# Patient Record
Sex: FEMALE | Race: WHITE | Employment: OTHER | URBAN - METROPOLITAN AREA
[De-identification: names, ages, dates, MRNs, and addresses within clinical notes are randomized per-mention and may not be internally consistent; named-entity substitution may affect disease eponyms.]

---

## 2017-07-07 ENCOUNTER — HOSPITAL ENCOUNTER (OUTPATIENT)
Dept: MAMMOGRAPHY | Age: 77
Discharge: HOME | End: 2017-07-07
Attending: FAMILY MEDICINE
Payer: MEDICARE

## 2017-07-07 DIAGNOSIS — Z12.31 ENCOUNTER FOR SCREENING MAMMOGRAM FOR MALIGNANT NEOPLASM OF BREAST: ICD-10-CM

## 2017-07-07 PROCEDURE — 77067 SCR MAMMO BI INCL CAD: CPT

## 2018-07-20 ENCOUNTER — HOSPITAL ENCOUNTER (OUTPATIENT)
Dept: MAMMOGRAPHY | Age: 78
Discharge: HOME | End: 2018-07-20
Attending: FAMILY MEDICINE
Payer: MEDICARE

## 2018-07-20 DIAGNOSIS — Z12.31 ENCOUNTER FOR SCREENING MAMMOGRAM FOR MALIGNANT NEOPLASM OF BREAST: ICD-10-CM

## 2018-07-20 PROCEDURE — 77067 SCR MAMMO BI INCL CAD: CPT

## 2019-01-29 PROBLEM — R20.0 NUMBNESS AND TINGLING OF LEG: Status: ACTIVE | Noted: 2019-01-29

## 2019-01-29 PROBLEM — M79.2 NEUROPATHIC PAIN OF ANKLE, RIGHT: Status: ACTIVE | Noted: 2019-01-29

## 2019-01-29 PROBLEM — Z79.899 ENCOUNTER FOR MEDICATION MANAGEMENT: Status: ACTIVE | Noted: 2019-01-29

## 2019-01-29 PROBLEM — R20.2 NUMBNESS AND TINGLING OF LEG: Status: ACTIVE | Noted: 2019-01-29

## 2019-05-10 PROBLEM — E66.3 OVERWEIGHT: Status: ACTIVE | Noted: 2019-05-10

## 2019-07-29 ENCOUNTER — HOSPITAL ENCOUNTER (OUTPATIENT)
Dept: LAB | Age: 79
Discharge: HOME | End: 2019-07-29

## 2019-07-29 PROCEDURE — 88305 TISSUE EXAM BY PATHOLOGIST: CPT

## 2019-09-13 ENCOUNTER — HOSPITAL ENCOUNTER (OUTPATIENT)
Dept: MAMMOGRAPHY | Age: 79
Discharge: HOME | End: 2019-09-13
Attending: FAMILY MEDICINE
Payer: MEDICARE

## 2019-09-13 DIAGNOSIS — Z12.39 BREAST SCREENING, UNSPECIFIED: ICD-10-CM

## 2019-09-13 PROCEDURE — 77067 SCR MAMMO BI INCL CAD: CPT

## 2020-01-29 ENCOUNTER — APPOINTMENT (RX ONLY)
Dept: URBAN - METROPOLITAN AREA CLINIC 24 | Facility: CLINIC | Age: 80
Setting detail: DERMATOLOGY
End: 2020-01-29

## 2020-01-29 DIAGNOSIS — L81.4 OTHER MELANIN HYPERPIGMENTATION: ICD-10-CM

## 2020-01-29 DIAGNOSIS — L82.1 OTHER SEBORRHEIC KERATOSIS: ICD-10-CM

## 2020-01-29 DIAGNOSIS — D22 MELANOCYTIC NEVI: ICD-10-CM

## 2020-01-29 DIAGNOSIS — D485 NEOPLASM OF UNCERTAIN BEHAVIOR OF SKIN: ICD-10-CM

## 2020-01-29 DIAGNOSIS — D18.0 HEMANGIOMA: ICD-10-CM

## 2020-01-29 PROBLEM — D22.5 MELANOCYTIC NEVI OF TRUNK: Status: ACTIVE | Noted: 2020-01-29

## 2020-01-29 PROBLEM — D18.01 HEMANGIOMA OF SKIN AND SUBCUTANEOUS TISSUE: Status: ACTIVE | Noted: 2020-01-29

## 2020-01-29 PROBLEM — D48.5 NEOPLASM OF UNCERTAIN BEHAVIOR OF SKIN: Status: ACTIVE | Noted: 2020-01-29

## 2020-01-29 PROCEDURE — ? COUNSELING

## 2020-01-29 PROCEDURE — 99202 OFFICE O/P NEW SF 15 MIN: CPT | Mod: 25

## 2020-01-29 PROCEDURE — 11102 TANGNTL BX SKIN SINGLE LES: CPT

## 2020-01-29 PROCEDURE — ? BIOPSY BY SHAVE METHOD

## 2020-01-29 ASSESSMENT — LOCATION DETAILED DESCRIPTION DERM
LOCATION DETAILED: RIGHT MEDIAL FOREHEAD
LOCATION DETAILED: LEFT SUPERIOR UPPER BACK
LOCATION DETAILED: RIGHT POSTERIOR SHOULDER
LOCATION DETAILED: RIGHT LATERAL ABDOMEN
LOCATION DETAILED: RIGHT SUPERIOR LATERAL UPPER BACK
LOCATION DETAILED: LEFT POSTERIOR SHOULDER

## 2020-01-29 ASSESSMENT — LOCATION SIMPLE DESCRIPTION DERM
LOCATION SIMPLE: LEFT UPPER BACK
LOCATION SIMPLE: RIGHT FOREHEAD
LOCATION SIMPLE: RIGHT BACK
LOCATION SIMPLE: LEFT SHOULDER
LOCATION SIMPLE: ABDOMEN
LOCATION SIMPLE: RIGHT SHOULDER

## 2020-01-29 ASSESSMENT — LOCATION ZONE DERM
LOCATION ZONE: TRUNK
LOCATION ZONE: ARM
LOCATION ZONE: FACE

## 2020-01-29 NOTE — HPI: RASH
What Type Of Note Output Would You Prefer (Optional)?: Standard Output
How Severe Is Your Rash?: mild
Is This A New Presentation, Or A Follow-Up?: Rash
Additional History: Took pepto last night and is now red in the face.

## 2020-01-29 NOTE — PROCEDURE: BIOPSY BY SHAVE METHOD
Detail Level: Detailed
Cryotherapy Text: The wound bed was treated with cryotherapy after the biopsy was performed.
Billing Type: Third-Party Bill
Electrodesiccation Text: The wound bed was treated with electrodesiccation after the biopsy was performed.
Render Post-Care Instructions In Note?: no
Wound Care: Petrolatum
Type Of Destruction Used: Curettage
Dressing: bandage
Depth Of Biopsy: dermis
Anesthesia Volume In Cc: 0.5
Biopsy Type: H and E
Was A Bandage Applied: Yes
Biopsy Method: Dermablade
Size Of Lesion In Cm: 0
Accession #: PC
Anesthesia Type: 1% lidocaine with 1:100,000 epinephrine and a 1:6 solution of 8.4% sodium bicarbonate
Silver Nitrate Text: The wound bed was treated with silver nitrate after the biopsy was performed.
Curettage Text: The wound bed was treated with curettage after the biopsy was performed.
Electrodesiccation And Curettage Text: The wound bed was treated with electrodesiccation and curettage after the biopsy was performed.
Hemostasis: Aluminum Chloride

## 2021-03-08 ENCOUNTER — APPOINTMENT (RX ONLY)
Dept: URBAN - METROPOLITAN AREA CLINIC 24 | Facility: CLINIC | Age: 81
Setting detail: DERMATOLOGY
End: 2021-03-08

## 2021-03-08 DIAGNOSIS — Z87.2 PERSONAL HISTORY OF DISEASES OF THE SKIN AND SUBCUTANEOUS TISSUE: ICD-10-CM

## 2021-03-08 DIAGNOSIS — L57.8 OTHER SKIN CHANGES DUE TO CHRONIC EXPOSURE TO NONIONIZING RADIATION: ICD-10-CM | Status: STABLE

## 2021-03-08 DIAGNOSIS — L60.3 NAIL DYSTROPHY: ICD-10-CM

## 2021-03-08 DIAGNOSIS — L82.0 INFLAMED SEBORRHEIC KERATOSIS: ICD-10-CM

## 2021-03-08 PROBLEM — E78.5 HYPERLIPIDEMIA, UNSPECIFIED: Status: ACTIVE | Noted: 2021-03-08

## 2021-03-08 PROBLEM — J30.1 ALLERGIC RHINITIS DUE TO POLLEN: Status: ACTIVE | Noted: 2021-03-08

## 2021-03-08 PROCEDURE — ? SHAVE REMOVAL

## 2021-03-08 PROCEDURE — 99213 OFFICE O/P EST LOW 20 MIN: CPT | Mod: 25

## 2021-03-08 PROCEDURE — ? COUNSELING

## 2021-03-08 PROCEDURE — 11311 SHAVE SKIN LESION 0.6-1.0 CM: CPT

## 2021-03-08 PROCEDURE — ? TREATMENT REGIMEN

## 2021-03-08 PROCEDURE — ? OTHER

## 2021-03-08 ASSESSMENT — LOCATION ZONE DERM
LOCATION ZONE: TRUNK
LOCATION ZONE: FACE
LOCATION ZONE: FINGERNAIL

## 2021-03-08 ASSESSMENT — LOCATION DETAILED DESCRIPTION DERM
LOCATION DETAILED: RIGHT MIDDLE FINGERNAIL
LOCATION DETAILED: RIGHT SUPERIOR LATERAL MIDBACK
LOCATION DETAILED: SUPERIOR THORACIC SPINE
LOCATION DETAILED: RIGHT MEDIAL ZYGOMA

## 2021-03-08 ASSESSMENT — LOCATION SIMPLE DESCRIPTION DERM
LOCATION SIMPLE: RIGHT LOWER BACK
LOCATION SIMPLE: RIGHT ZYGOMA
LOCATION SIMPLE: RIGHT MIDDLE FINGERNAIL
LOCATION SIMPLE: UPPER BACK

## 2021-03-08 NOTE — PROCEDURE: OTHER
Note Text (......Xxx Chief Complaint.): This diagnosis correlates with the
Detail Level: Simple
Render Risk Assessment In Note?: no
Other (Free Text): Pt gave verbal consent for treatment today. Witnessed by Cassi Fishman CST

## 2021-03-08 NOTE — PROCEDURE: SHAVE REMOVAL
Render Path Notes In Note?: No
Medical Necessity Clause: Obstruction of vision
Medical Necessity Information: It is in your best interest to select a reason for this procedure from the list below. All of these items fulfill various CMS LCD requirements except the new and changing color options.
Accession #: [id number]
Anesthesia Type: 1% lidocaine with epinephrine and a 1:10 solution of 8.4% sodium bicarbonate
Billing Type: Third-Party Bill
Wound Care: Vaseline
Was A Bandage Applied: Yes
Detail Level: Detailed
X Size Of Lesion In Cm (Optional): 0
Anesthesia Volume In Cc: 0.4
Hemostasis: Electrodesiccation
Biopsy Method: Dermablade
Consent was obtained from the patient. The risks and benefits to therapy were discussed in detail. Specifically, the risks of infection, scarring, bleeding, prolonged wound healing, incomplete removal, allergy to anesthesia, nerve injury and recurrence were addressed. Prior to the procedure, the treatment site was clearly identified and confirmed by the patient. All components of Universal Protocol/PAUSE Rule completed.
Size Of Lesion In Cm (Required): 0.6
Notification Instructions: Patient will be notified of biopsy results. However, patient instructed to call the office if not contacted within 2 weeks.
Post-Care Instructions: I reviewed with the patient in detail post-care instructions. Patient is to keep the biopsy site dry overnight, and then apply bacitracin twice daily until healed. Patient may apply hydrogen peroxide soaks to remove any crusting.
Path Notes (To The Dermatopathologist): Please check margins.

## 2022-03-18 PROBLEM — R20.2 NUMBNESS AND TINGLING OF FOOT: Status: ACTIVE | Noted: 2019-01-29

## 2022-03-18 PROBLEM — R20.0 NUMBNESS AND TINGLING OF FOOT: Status: ACTIVE | Noted: 2019-01-29

## 2022-03-19 PROBLEM — M79.2 NEUROPATHIC PAIN OF ANKLE, RIGHT: Status: ACTIVE | Noted: 2019-01-29

## 2022-03-19 PROBLEM — E66.3 OVERWEIGHT: Status: ACTIVE | Noted: 2019-05-10

## 2022-05-25 ENCOUNTER — OFFICE VISIT (OUTPATIENT)
Dept: FAMILY MEDICINE CLINIC | Facility: CLINIC | Age: 82
End: 2022-05-25
Payer: MEDICARE

## 2022-05-25 VITALS
HEIGHT: 61 IN | SYSTOLIC BLOOD PRESSURE: 146 MMHG | BODY MASS INDEX: 27.75 KG/M2 | HEART RATE: 71 BPM | WEIGHT: 147 LBS | RESPIRATION RATE: 16 BRPM | OXYGEN SATURATION: 95 % | DIASTOLIC BLOOD PRESSURE: 69 MMHG | TEMPERATURE: 97.2 F

## 2022-05-25 DIAGNOSIS — M25.512 ACUTE PAIN OF LEFT SHOULDER: Primary | ICD-10-CM

## 2022-05-25 PROCEDURE — G8427 DOCREV CUR MEDS BY ELIG CLIN: HCPCS | Performed by: FAMILY MEDICINE

## 2022-05-25 PROCEDURE — G8417 CALC BMI ABV UP PARAM F/U: HCPCS | Performed by: FAMILY MEDICINE

## 2022-05-25 PROCEDURE — G8400 PT W/DXA NO RESULTS DOC: HCPCS | Performed by: FAMILY MEDICINE

## 2022-05-25 PROCEDURE — 1036F TOBACCO NON-USER: CPT | Performed by: FAMILY MEDICINE

## 2022-05-25 PROCEDURE — 1123F ACP DISCUSS/DSCN MKR DOCD: CPT | Performed by: FAMILY MEDICINE

## 2022-05-25 PROCEDURE — 1090F PRES/ABSN URINE INCON ASSESS: CPT | Performed by: FAMILY MEDICINE

## 2022-05-25 PROCEDURE — 99213 OFFICE O/P EST LOW 20 MIN: CPT | Performed by: FAMILY MEDICINE

## 2022-05-25 RX ORDER — MELOXICAM 7.5 MG/1
7.5 TABLET ORAL DAILY
Qty: 60 TABLET | Refills: 1 | Status: SHIPPED | OUTPATIENT
Start: 2022-05-25 | End: 2022-06-22 | Stop reason: SINTOL

## 2022-05-25 ASSESSMENT — PATIENT HEALTH QUESTIONNAIRE - PHQ9
SUM OF ALL RESPONSES TO PHQ QUESTIONS 1-9: 0
SUM OF ALL RESPONSES TO PHQ QUESTIONS 1-9: 0
2. FEELING DOWN, DEPRESSED OR HOPELESS: 0
SUM OF ALL RESPONSES TO PHQ9 QUESTIONS 1 & 2: 0
SUM OF ALL RESPONSES TO PHQ QUESTIONS 1-9: 0
SUM OF ALL RESPONSES TO PHQ QUESTIONS 1-9: 0
1. LITTLE INTEREST OR PLEASURE IN DOING THINGS: 0

## 2022-05-25 NOTE — PROGRESS NOTES
[address] [facility]  Phone: [phone] Fax [phone]  Tera Morales MD  2022           Ms. Mei Chi  is a 80y.o.  year old  female patient who comes in complaining of a month long h/o left shoulder pain. She says she does workout and she thinks she may have strained something. She thought it would be better by now. She says when she moves her right shoulder sometimes she will feel her left shoulder hurt as well. It is hard for her to raise her arms above shoulder height. She feels the pain across the top of her shoulder particularly on the left. She has not noticed any redness or swelling. She has tried ibuprofen without much relief. Ms. Mei Chi  has  has a past medical history of Hypercholesteremia, Hypercholesterolemia, Hypertension, Other ill-defined conditions(409.19), and Thyroid disease. Ms. Mei Chi  has  has a past surgical history that includes Breast biopsy (Right, ); gyn; Cholecystectomy; lap,cholecystectomy (May 2010); and Breast surgery. Ms. Mei Chi   Current Outpatient Medications   Medication Sig Dispense Refill    meloxicam (MOBIC) 7.5 MG tablet Take 1 tablet by mouth daily 60 tablet 1    hydroCHLOROthiazide (HYDRODIURIL) 25 MG tablet Take 12.5 mg by mouth daily      levothyroxine (SYNTHROID) 75 MCG tablet Take 75 mcg by mouth every morning (before breakfast)      lovastatin (MEVACOR) 20 MG tablet Take 20 mg by mouth       No current facility-administered medications for this visit. Ms. Mehul Weaverum History     Socioeconomic History    Marital status:      Spouse name: None    Number of children: None    Years of education: None    Highest education level: None   Occupational History    None   Tobacco Use    Smoking status: Former Smoker     Quit date: 1980     Years since quittin.0    Smokeless tobacco: Never Used   Substance and Sexual Activity    Alcohol use:  Yes Resp 16   Ht 5' 1\" (1.549 m)   Wt 147 lb (66.7 kg)   SpO2 95%   BMI 27.78 kg/m²     HEENT: Normocephalic, atraumatic, pupils equal and reactive to light. Neck: Supple, no masses or thyromegaly. Lungs: clear to auscultation bilaterally. CV: regular rate and rhythm, without murmurs, rubs, or gallops. Right shoulder: She is not tender across the top of her shoulder on the right. Internal and external rotation do not cause pain. She has minimal pain across the top of her right shoulder with full extension. Left shoulder: Tender across the top of her shoulder on the left. Internal and external rotation do not cause pain but extension at her left shoulder does cause pain across the top of her shoulder. Full cup sign is positive. Empty cup sign is negative. Handgrip is 5 out of 5 both hands. DTRs are 2+ both upper and lower extremities. Ext: No lower extremity edema. X-ray left shoulder: No acute process. Karrie Craft was seen today for shoulder pain and neck pain. Diagnoses and all orders for this visit:    Acute pain of left shoulder  -     XR SHOULDER LEFT (MIN 2 VIEWS); Future  -     meloxicam (MOBIC) 7.5 MG tablet; Take 1 tablet by mouth daily  -     [facility], [address] [place] [address]    See me in 4-6 weeks. Side effects and risks of the medicine given and gone over. Call for problems.     Jacqueline Morton MD

## 2022-05-26 DIAGNOSIS — M25.512 ACUTE PAIN OF LEFT SHOULDER: ICD-10-CM

## 2022-05-26 PROCEDURE — 73030 X-RAY EXAM OF SHOULDER: CPT | Performed by: FAMILY MEDICINE

## 2022-06-20 ENCOUNTER — HOSPITAL ENCOUNTER (OUTPATIENT)
Dept: PHYSICAL THERAPY | Age: 82
Setting detail: RECURRING SERIES
Discharge: HOME | End: 2022-06-23
Payer: MEDICARE

## 2022-06-20 PROCEDURE — 97110 THERAPEUTIC EXERCISES: CPT

## 2022-06-20 PROCEDURE — 97140 MANUAL THERAPY 1/> REGIONS: CPT

## 2022-06-20 PROCEDURE — 97161 PT EVAL LOW COMPLEX 20 MIN: CPT

## 2022-06-20 NOTE — THERAPY EVALUATION
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 Bren Donald is a 80 y.o. female presenting to physical therapy with complaints of pain across bilateral shoulders which began around April. She reports pain with reaching forward and at times having pain in bilateral UEs, R worse than L. She also complains of R UE cramping that is brief but occurs daily. Patient reports thinking she strained a muscle working out a [employer] doing the shoulder press machine, but realizing the pain was still lingering. Patient reports feeling worse than when the pain started and having some neck stiffness as well. She is still working out, just with lighter weights and is able to bowl without problems.  Patient is eager to reduce her pain and improve her overall mobility in order to return to her prior level of independence with ADLs and housework. Patient presents with increased pain, decreased strength, decreased ROM, decreased flexibility, impaired gait, impaired posture, impaired overhead reach, impaired transfer ability, decreased activity tolerance, and overall impaired functional mobility. Patient Stated Goal(s):  \"I just want to feel better and not hurt\"  Initial:     2 (across bilateral shoulder blades)/10 Post Session:     1/10  Past Medical History/Comorbidities:   Ms. Melvina Villa  has a past medical history of Hypercholesteremia, Hypercholesterolemia, Hypertension, Other ill-defined conditions(799.89), and Thyroid disease. Ms. Melvina Villa  has a past surgical history that includes Breast biopsy (Right, 2014); gyn; Cholecystectomy; lap,cholecystectomy (May 2010); and Breast surgery. Social History/Living Environment:   Type of Home: House     Prior Level of Function/Work/Activity:   Prior level of function: Independent  Occupation: Retired  No data recordedNo data recorded   Learning:   Does the patient/guardian have any barriers to learning?: No barriers  Will there be a co-learner?: No  What is the preferred language of the patient/guardian?: English  Is an  required?: No  How does the patient/guardian prefer to learn new concepts?: Listening; Reading; Demonstration     Fall Risk Scale: Total Score: 0  King Fall Risk: Low (0-24)     Dominant Side:  right handed        OBJECTIVE     Patient denies any headaches, changes in vision, dizziness, vertigo, nausea, drop attacks, black outs, tinnitus, dysphagia, dysarthria, LE symptoms or bowel/bladder dysfunction. Observation/Orthostatic Postural Assessment:          Patient with forward head, rounded shoulders, increased thoracic kyphosis, scapular protraction bilaterally, R scapular winging and elevation noted. Increased tension through bilateral upper trapezius.     Palpation:          Moderate to significant tenderness and tightness through bilateral upper trapezius, levator scapula, cervical paraspinals. Minimal tenderness to palpation of bilateral glenohumeral and acromioclavicular joints. ROM:    AROM/ PROM Left (degrees) Right (degrees)   Shoulder Flexion 150 145   Shoulder Abduction 145 140   Shoulder Internal Rotation  80 70   Functional Internal Rotation L1 L1   Shoulder External Rotation 65 75   Functional External Rotation T2 T2     AROM/ PROM Degrees   Cervical Flexion 60   Cervical Extension 40   Right Rotation 50   Left Rotation 35   Right Lateral Flexion 40   Left Lateral Flexion 35     Vertebral-Basilar Screen: Positional Provocation testing is negative. Strength:          No pain reproduction with UE resistance testing  Motion Tested Left   (*/5) Right  (*/5)   Shoulder Flexion 5 5   Scaption 5 4+   Shoulder Abduction 5 5   Shoulder Internal Rotation  5 5   Shoulder External Rotation 5 5   Elbow Flexion 5 5   Elbow Extension 5 5     Special Tests:     Impingement tests:  Malik-Pernell test: negative bilaterally  Neer test: positive on R  Rotator Cuff:  Drop sign: negative bilaterally  Stability tests:  Sulcus sign: negative bilaterally  Apprehension test: negative bilaterally  Step Deformity: negative bilaterally  Glenoid labrum integrity tests:  [place] active compression test: negative bilaterally  Ligament stress tests performed through upper cervical spine for transverse ligament including Sharp-Cory test is negative, and [place]-Axis shear test is negative. [place]-Axis side flexion test for integrity of alar ligament is negative. Spurling test is negative. Cervical distraction is negative for change in symptoms, but \"feels good\". Passive Accessory Motion:         Moderate tightness through bilateral glenohumeral joints- no reproduction of symptoms. Significant tightness with cervical side glides with discomfort noted. Neurological Screen:              Myotomes: Key muscle strength testing through bilateral UE is [facility].    Dermatomes: Sensation to light touch for bilateral UE is intact from C4 to T2. Reflexes: Biceps (C5): 1+ bilaterally         Brachioradialis (C6): 0 bilaterally        Triceps (C7): 0 bilaterally  Functional Mobility:         Patient with increased pain with cooking/ eating, performing housework- specifically mopping, some discomfort sleeping on sides at times, able to bowl without pain at this time, some pain with lifting. Balance:          Sitting and standing balance grossly intact. ASSESSMENT   Initial Assessment:  Ms. Derek Egdar is a 80 y.o. female presenting to physical therapy with complaints of pain across bilateral shoulders which began around April. She reports pain with reaching forward and at times having pain in bilateral UEs, R worse than L. She also complains of R UE cramping that is brief but occurs daily. Patient reports thinking she strained a muscle working out a [employer] doing the shoulder press machine, but realizing the pain was still lingering. Patient reports feeling worse than when the pain started and having some neck stiffness as well. She is still working out, just with lighter weights and is able to bowl without problems. Patient is eager to reduce her pain and improve her overall mobility in order to return to her prior level of independence with ADLs and housework. Patient presents with increased pain, decreased strength, decreased ROM, decreased flexibility, impaired gait, impaired posture, impaired overhead reach, impaired transfer ability, decreased activity tolerance, and overall impaired functional mobility. Patient is a good candidate for skilled physical therapy interventions to include manual therapy, therapeutic exercise, postural re-education, body mechanics training, and pain modalities as needed. Problem List: (Impacting functional limitations): Body Structures, Functions, Activity Limitations Requiring Skilled Therapeutic Intervention: Decreased functional mobility ; Decreased ROM;  Decreased body mechanics; Decreased strength; Decreased endurance; Increased pain; Decreased posture     Therapy Prognosis:   Therapy Prognosis: Good     Assessment Complexity:   Low Complexity  PLAN   Effective Dates: 6/20/2022 TO Plan of Care/Certification Expiration Date: 08/01/22 (start of plan of care 6/20/2022)     Frequency/Duration: Plan Frequency: 2 times a week for 6 weeks     Interventions Planned (Treatment may consist of any combination of the following):    Current Treatment Recommendations: Strengthening; ROM; Functional mobility training; ADL/Self-care training; Manual Therapy - Soft Tissue Mobilization; Manual Therapy - Joint Manipulation; Pain management; Home exercise program; Safety education & training; Modalities; Positioning; Integrated dry needling; Therapeutic activities     GOALS: (Goals have been discussed and agreed upon with patient.)  Short-Term Functional Goals: Time Frame: 6/20/2022  to 7/14/2022  1. Patient demonstrates independence with home exercise program without verbal cueing provided by therapist.   2. Patient will report no more than 1/10 bilateral shoulder pain at rest in order to demonstrate improved self pain control and tolerance. 3. Patient will be educated in and demonstrate improved upright posture including decreased forward head and scapular protraction to decrease strain on the cervical spine. 4. Patient will be educated in safe lifting techniques in order to decrease strain on cervical spine and shoulder pain with home activities. Discharge Goals: Time Frame: 6/20/2022  to 8/1/2022  Patient will improve bilateral cervical side bending to at least 45 degrees in order to demonstrate improved joint and tissue mobility. Patient will improve bilateral cervical rotations to at least 50 degrees in order to improve ability to look over shoulder while driving.   Patient will be able to return to prior exercise level and continue bowling without pain in order to return to prior level of recreational activities. Patient will be able to perform housework and light lifting at home without pain in order to return to prior level of activities around her home. Patient will improve Disability of the Arm, Shoulder, and Hand score to 17/55 from 24/55. Outcome Measure: Tool Used: Disabilities of the Arm, Shoulder and Hand (DASH) Questionnaire - Quick Version  Score:  Initial: 24/55  Most Recent: X/55 (Date: -- )   Interpretation of Score: The DASH is designed to measure the activities of daily living in person's with upper extremity dysfunction or pain. Each section is scored on a 1-5 scale, 5 representing the greatest disability. The scores of each section are added together for a total score of 55. Medical Necessity:    Patient is expected to demonstrate progress in strength, range of motion, coordination and functional technique to increase independence with ADLs and improve safety during reaching, lifting, and returning to prior level of activities.  Skilled intervention continues to be required due to increased bilateral shoulder pain hindering functional mobility. Reason For Services/Other Comments:   Patient continues to require skilled intervention due to decreased activity tolerance and increased pain impacting quality of life and independence. Total Duration:       Regarding Marchelle Aschoff Odilon's therapy, I certify that the treatment plan above will be carried out by a therapist or under their direction.   Thank you for this referral,  Kaley Juarez, PT     Referring Physician Signature: Dexter Louie MD _______________________________ Date _____________        Post Session Pain  Charge Capture  PT Visit Info  POC Link  Treatment Note Link  MD Guidelines  MyChart

## 2022-06-20 NOTE — PROGRESS NOTES
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.04)  Cervicalgia (M54.2)  Medical/Referring Diagnosis:  Pain in left shoulder [M25.512]  Referring Physician:  Kaleigh Reza MD MD Orders:  PT Eval and Treat   Date of Onset:  Onset Date: 22 (insidious onset of bilateral shoulder pain)     Allergies:   Penicillins  Restrictions/Precautions:  Restrictions/Precautions: None  No data recorded   Interventions Planned (Treatment may consist of any combination of the following):    Current Treatment Recommendations: Strengthening; ROM; Functional mobility training; ADL/Self-care training; Manual Therapy - Soft Tissue Mobilization; Manual Therapy - Joint Manipulation; Pain management; Home exercise program; Safety education & training; Modalities; Positioning; Integrated dry needling; Therapeutic activities     Subjective Comments: Both of my shoulders started bothering me across the shoulder blades. Sometimes I get pain into my arms, R more so, and my R hand cramps.   Initial:}    2 (across bilateral shoulder blades)/10Post Session:       1/10  Medications Last Reviewed:  2022  Updated Objective Findings:  See evaluation note from today  Treatment   THERAPEUTIC EXERCISE: (10 minutes):    Exercises per grid below to improve mobility, strength, coordination and dynamic movement of left shoulder to improve functional lifting, carrying, reaching and overhead activites. Required moderate visual, verbal, manual and tactile cues to promote proper body alignment, promote proper body posture and promote proper body mechanics. Progressed resistance, range, repetitions and complexity of movement as indicated. Date:  6/20/2022 Date:   Date:     Activity/Exercise Parameters Parameters Parameters   Scapular retractions X 10     Backwards shoulder rolls X 10     Upper trapezius stretch X 2     Levator scapula stretch X 2                         Time spent with patient reviewing proper muscle recruitment and technique with exercises. MANUAL THERAPY: (15 minutes):   Joint mobilization and Soft tissue mobilization was utilized and necessary because of the patient's restricted joint motion, painful spasm, loss of articular motion and restricted motion of soft tissue. · Supine gentle cervical traction to improve relaxation  · Gentle bilateral upper trapezius and levator scapula stretching to improve mobility and decrease tension  · Trigger point release to bilateral upper trapezius to decrease tightness and pain    MODALITIES: (0 minutes):        patient denied heat or ice today     HEP: As above; handouts given to patient for all exercises. Treatment/Session Summary:    · Treatment Assessment:  Patient with good understanding of start of HEP. Decreased pain and tightness reported at end of session. Anticipate good response to cervical treatment as well as postural education and exercise. · Communication/Consultation:  Spoke with patient in regards to HEP, safety at home, plan of care, and progression of therapy session. · Equipment provided today:  Patient given handout with above exercises for home. · Recommendations/Intent for next treatment session: Next visit will focus on pain control, manual therapy and modalities as needed, progression of postural education and exercise as tolerated.     Total Treatment Billable Duration:  50 minutes   Time In: 1231  Time Out: 1330    Thom Fonseca, PT       Charge Capture  }Post Session Pain  PT Visit Info  [facility] Portal  MD Guidelines  Scanned Media  Benefits  MyChart

## 2022-06-21 ASSESSMENT — PAIN SCALES - GENERAL: PAINLEVEL_OUTOF10: 2

## 2022-06-22 ENCOUNTER — OFFICE VISIT (OUTPATIENT)
Dept: FAMILY MEDICINE CLINIC | Facility: CLINIC | Age: 82
End: 2022-06-22
Payer: MEDICARE

## 2022-06-22 ENCOUNTER — NURSE ONLY (OUTPATIENT)
Dept: FAMILY MEDICINE CLINIC | Facility: CLINIC | Age: 82
End: 2022-06-22

## 2022-06-22 VITALS
DIASTOLIC BLOOD PRESSURE: 80 MMHG | TEMPERATURE: 97 F | RESPIRATION RATE: 16 BRPM | OXYGEN SATURATION: 95 % | BODY MASS INDEX: 28.32 KG/M2 | HEIGHT: 61 IN | HEART RATE: 75 BPM | SYSTOLIC BLOOD PRESSURE: 138 MMHG | WEIGHT: 150 LBS

## 2022-06-22 DIAGNOSIS — E78.2 MIXED HYPERLIPIDEMIA: ICD-10-CM

## 2022-06-22 DIAGNOSIS — I10 ESSENTIAL HYPERTENSION, BENIGN: ICD-10-CM

## 2022-06-22 DIAGNOSIS — Z00.00 MEDICARE ANNUAL WELLNESS VISIT, SUBSEQUENT: Primary | ICD-10-CM

## 2022-06-22 DIAGNOSIS — E03.9 ACQUIRED HYPOTHYROIDISM: ICD-10-CM

## 2022-06-22 DIAGNOSIS — Z12.11 SCREENING FOR MALIGNANT NEOPLASM OF COLON: ICD-10-CM

## 2022-06-22 DIAGNOSIS — M25.512 ACUTE PAIN OF LEFT SHOULDER: ICD-10-CM

## 2022-06-22 LAB
CHOLEST SERPL-MCNC: 144 MG/DL
HDLC SERPL-MCNC: 40 MG/DL (ref 40–60)
HDLC SERPL: 3.6 {RATIO}
LDLC SERPL CALC-MCNC: 70 MG/DL
TRIGL SERPL-MCNC: 170 MG/DL (ref 35–150)
TSH, 3RD GENERATION: 1.58 UIU/ML (ref 0.36–3.74)
VLDLC SERPL CALC-MCNC: 34 MG/DL (ref 6–23)

## 2022-06-22 PROCEDURE — G8417 CALC BMI ABV UP PARAM F/U: HCPCS | Performed by: FAMILY MEDICINE

## 2022-06-22 PROCEDURE — 1036F TOBACCO NON-USER: CPT | Performed by: FAMILY MEDICINE

## 2022-06-22 PROCEDURE — G0439 PPPS, SUBSEQ VISIT: HCPCS | Performed by: FAMILY MEDICINE

## 2022-06-22 PROCEDURE — 99213 OFFICE O/P EST LOW 20 MIN: CPT | Performed by: FAMILY MEDICINE

## 2022-06-22 PROCEDURE — G8400 PT W/DXA NO RESULTS DOC: HCPCS | Performed by: FAMILY MEDICINE

## 2022-06-22 PROCEDURE — 1090F PRES/ABSN URINE INCON ASSESS: CPT | Performed by: FAMILY MEDICINE

## 2022-06-22 PROCEDURE — G8427 DOCREV CUR MEDS BY ELIG CLIN: HCPCS | Performed by: FAMILY MEDICINE

## 2022-06-22 PROCEDURE — 1123F ACP DISCUSS/DSCN MKR DOCD: CPT | Performed by: FAMILY MEDICINE

## 2022-06-22 RX ORDER — NAPROXEN 500 MG/1
500 TABLET ORAL 2 TIMES DAILY WITH MEALS
Qty: 180 TABLET | Refills: 3 | Status: SHIPPED | OUTPATIENT
Start: 2022-06-22 | End: 2022-09-07 | Stop reason: ALTCHOICE

## 2022-06-22 RX ORDER — LOVASTATIN 20 MG/1
20 TABLET ORAL NIGHTLY
Qty: 90 TABLET | Refills: 3 | Status: SHIPPED | OUTPATIENT
Start: 2022-06-22

## 2022-06-22 RX ORDER — LEVOTHYROXINE SODIUM 0.07 MG/1
75 TABLET ORAL
Qty: 90 TABLET | Refills: 3 | Status: SHIPPED | OUTPATIENT
Start: 2022-06-22

## 2022-06-22 RX ORDER — NAPROXEN 500 MG/1
500 TABLET ORAL 2 TIMES DAILY WITH MEALS
Qty: 180 TABLET | Refills: 3 | Status: SHIPPED | OUTPATIENT
Start: 2022-06-22 | End: 2022-06-22 | Stop reason: SDUPTHER

## 2022-06-22 RX ORDER — HYDROCHLOROTHIAZIDE 25 MG/1
12.5 TABLET ORAL DAILY
Qty: 90 TABLET | Refills: 3 | Status: SHIPPED | OUTPATIENT
Start: 2022-06-22

## 2022-06-22 SDOH — HEALTH STABILITY: PHYSICAL HEALTH: ON AVERAGE, HOW MANY MINUTES DO YOU ENGAGE IN EXERCISE AT THIS LEVEL?: 60 MIN

## 2022-06-22 SDOH — HEALTH STABILITY: PHYSICAL HEALTH: ON AVERAGE, HOW MANY DAYS PER WEEK DO YOU ENGAGE IN MODERATE TO STRENUOUS EXERCISE (LIKE A BRISK WALK)?: 3 DAYS

## 2022-06-22 ASSESSMENT — LIFESTYLE VARIABLES
HOW OFTEN DO YOU HAVE SIX OR MORE DRINKS ON ONE OCCASION: 1
HOW MANY STANDARD DRINKS CONTAINING ALCOHOL DO YOU HAVE ON A TYPICAL DAY: 1 OR 2
HOW OFTEN DO YOU HAVE A DRINK CONTAINING ALCOHOL: 2-4 TIMES A MONTH
HOW OFTEN DO YOU HAVE A DRINK CONTAINING ALCOHOL: 3
HOW MANY STANDARD DRINKS CONTAINING ALCOHOL DO YOU HAVE ON A TYPICAL DAY: 1

## 2022-06-22 ASSESSMENT — PATIENT HEALTH QUESTIONNAIRE - PHQ9
SUM OF ALL RESPONSES TO PHQ QUESTIONS 1-9: 0
SUM OF ALL RESPONSES TO PHQ9 QUESTIONS 1 & 2: 0
SUM OF ALL RESPONSES TO PHQ QUESTIONS 1-9: 0
SUM OF ALL RESPONSES TO PHQ QUESTIONS 1-9: 0
2. FEELING DOWN, DEPRESSED OR HOPELESS: 0
1. LITTLE INTEREST OR PLEASURE IN DOING THINGS: 0
SUM OF ALL RESPONSES TO PHQ QUESTIONS 1-9: 0

## 2022-06-22 NOTE — PROGRESS NOTES
[address] [facility]  [place], [place] [address]  Phone: [phone] Fax [phone]  Kiarra Davidson MD  6/22/2022           Ms. Imani Hall  is a 80y.o.  year old  female patient who comes in complaining of feeling off balance after taking the meloxicam.  She stopped taking it. It was helping her shoulder however. She is going to physical therapy about her left shoulder. The first time she went to really help but now is bothering her again. She does have several sessions left to go to. She does continue on hydrochlorothiazide, Synthroid, Mevacor. Her blood pressure usually runs 120/70 at home. She is not having any trouble with her medicines. She did have a colonoscopy 3 years ago and it showed a tubular adenoma and they recommended she have a repeat colonoscopy. She is wondering if she needs to do that. Ms. Imani Hall  has  has a past medical history of Hypercholesteremia, Hypercholesterolemia, Hypertension, Other ill-defined conditions(799.06), and Thyroid disease. Ms. Imani Hall  has  has a past surgical history that includes Breast biopsy (Right, 2014); gyn; Cholecystectomy; lap,cholecystectomy (May 2010); and Breast surgery. Ms. Imani Hall   Current Outpatient Medications   Medication Sig Dispense Refill    lovastatin (MEVACOR) 20 MG tablet Take 1 tablet by mouth nightly 90 tablet 3    levothyroxine (SYNTHROID) 75 MCG tablet Take 1 tablet by mouth every morning (before breakfast) 90 tablet 3    hydroCHLOROthiazide (HYDRODIURIL) 25 MG tablet Take 0.5 tablets by mouth daily 90 tablet 3    naproxen (NAPROSYN) 500 MG tablet Take 1 tablet by mouth 2 times daily (with meals) 180 tablet 3     No current facility-administered medications for this visit.        Ms. Carrington Na History     Socioeconomic History    Marital status:      Spouse name: None    Number of children: None    Years of education: None    Highest education level: None   Occupational History  None   Tobacco Use    Smoking status: Former Smoker     Quit date: 1980     Years since quittin.1    Smokeless tobacco: Never Used   Substance and Sexual Activity    Alcohol use: Yes     Alcohol/week: 0.0 standard drinks    Drug use: No    Sexual activity: None   Other Topics Concern    None   Social History Narrative    None     Social Determinants of Health     Financial Resource Strain:     Difficulty of Paying Living Expenses: Not on file   Food Insecurity:     Worried About Running Out of Food in the Last Year: Not on file    Christopher of Food in the Last Year: Not on file   Transportation Needs:     Lack of Transportation (Medical): Not on file    Lack of Transportation (Non-Medical): Not on file   Physical Activity: Sufficiently Active    Days of Exercise per Week: 3 days    Minutes of Exercise per Session: 60 min   Stress:     Feeling of Stress : Not on file   Social Connections:     Frequency of Communication with Friends and Family: Not on file    Frequency of Social Gatherings with Friends and Family: Not on file    Attends Protestant Services: Not on file    Active Member of Clubs or Organizations: Not on file    Attends Club or Organization Meetings: Not on file    Marital Status: Not on file   Intimate Partner Violence:     Fear of Current or Ex-Partner: Not on file    Emotionally Abused: Not on file    Physically Abused: Not on file    Sexually Abused: Not on file   Housing Stability:     Unable to Pay for Housing in the Last Year: Not on file    Number of [place] in the Last Year: Not on file    Unstable Housing in the Last Year: Not on file       Ms. Camden Soares   Family History   Problem Relation Age of Onset    Heart Disease Mother     Stroke Mother     Hypertension Sister     Heart Attack Mother     Hypertension Mother     Heart Attack Brother     Diabetes Father     Heart Disease Father         MI    Hypertension Sister     Diabetes Sister    [place] Diabetes Brother     Diabetes Mother             Ms. Dalila Mayo  has the following allergies: Allergies   Allergen Reactions    Penicillins Anaphylaxis       /80   Pulse 75   Temp 97 °F (36.1 °C)   Resp 16   Ht 5' 1\" (1.549 m)   Wt 150 lb (68 kg)   SpO2 95%   BMI 28.34 kg/m²     HEENT: Normocephalic, atraumatic, pupils equal and reactive to light. Neck: Supple, no masses or thyromegaly. Lungs: clear to auscultation bilaterally. CV: regular rate and rhythm, without  Ext: No lower extremity edema. Dave Molina was seen today for medicare awv. Diagnoses and all orders for this visit:    Medicare annual wellness visit, subsequent    Acute pain of left shoulder  -     Discontinue: naproxen (NAPROSYN) 500 MG tablet; Take 1 tablet by mouth 2 times daily (with meals)  -     naproxen (NAPROSYN) 500 MG tablet; Take 1 tablet by mouth 2 times daily (with meals)    Acquired hypothyroidism  -     levothyroxine (SYNTHROID) 75 MCG tablet; Take 1 tablet by mouth every morning (before breakfast)  -     TSH; Future    Essential hypertension, benign  -     hydroCHLOROthiazide (HYDRODIURIL) 25 MG tablet; Take 0.5 tablets by mouth daily  -     CBC with Auto Differential; Future  -     Comprehensive Metabolic Panel; Future  -     Lipid Panel; Future    Mixed hyperlipidemia  -     lovastatin (MEVACOR) 20 MG tablet; Take 1 tablet by mouth nightly  -     Lipid Panel; Future    Screening for malignant neoplasm of colon  -     [facility] - Jonna Sylvester MD, Gastroenterology    She can try meloxicam again to be sure if it really is causing her to be off balance. If it does I want her to try the naproxen instead. Continue physical therapy. Would like for [facility] to [facility] to discuss about risks and benefits with repeated colonoscopy screenings.     Young Mendieta MD      Medicare Annual Wellness Visit    Zeke Luna is here for Medicare AWV    Assessment & Plan   Medicare annual wellness visit, subsequent  Acute pain of left shoulder  -     naproxen (NAPROSYN) 500 MG tablet; Take 1 tablet by mouth 2 times daily (with meals), Disp-180 tablet, R-3Normal  Acquired hypothyroidism  -     levothyroxine (SYNTHROID) 75 MCG tablet; Take 1 tablet by mouth every morning (before breakfast), Disp-90 tablet, R-3Normal  -     TSH; Future  Essential hypertension, benign  -     hydroCHLOROthiazide (HYDRODIURIL) 25 MG tablet; Take 0.5 tablets by mouth daily, Disp-90 tablet, R-3Normal  -     CBC with Auto Differential; Future  -     Comprehensive Metabolic Panel; Future  -     Lipid Panel; Future  Mixed hyperlipidemia  -     lovastatin (MEVACOR) 20 MG tablet; Take 1 tablet by mouth nightly, Disp-90 tablet, R-3Normal  -     Lipid Panel; Future  Screening for malignant neoplasm of colon  -     [facility] - Hermann Pinzon MD, Gastroenterology      Recommendations for Preventive Services Due: see orders and patient instructions/AVS.  Recommended screening schedule for the next 5-10 years is provided to the patient in written form: see Patient Instructions/AVS.     Return in 1 year (on 6/22/2023) for Medicare Annual Wellness Visit in 1 year. Subjective       Patient's complete Health Risk Assessment and screening values have been reviewed and are found in Flowsheets. The following problems were reviewed today and where indicated follow up appointments were made and/or referrals ordered. Positive Risk Factor Screenings with Interventions:     Cognitive:  Clock Drawing Test (CDT): (!) Abnormal  Total Score Interpretation: Abnormal Mini-Cog  Did the patient refuse to take the cognition test?: No    Cognitive Impairment Interventions:  · Patient does not have trouble with her memory.   Clock draw test repeat was normal.             General Health and ACP:  General  In general, how would you say your health is?: Good  In the past 7 days, have you experienced any of the following: New or Increased Pain, New or Increased Fatigue, Loneliness, Social Isolation, Stress or Anger?: No  Do you get the social and emotional support that you need?: Yes  Do you have a Living Will?: Yes    Advance Directives     Power of  Living Will ACP-Advance Directive ACP-Power of     Not on File Not on File Not on File Not on File      General Health Risk Interventions:  · No issues identified              Objective   Vitals:    06/22/22 1033 06/22/22 1057   BP: (!) 150/74 138/80   Pulse: 75    Resp: 16    Temp: 97 °F (36.1 °C)    SpO2: 95%    Weight: 150 lb (68 kg)    Height: 5' 1\" (1.549 m)       Body mass index is 28.34 kg/m². See above      Allergies   Allergen Reactions    Penicillins Anaphylaxis     Prior to Visit Medications    Medication Sig Taking?  Authorizing Provider   lovastatin (MEVACOR) 20 MG tablet Take 1 tablet by mouth nightly Yes Myriam Allen MD   levothyroxine (SYNTHROID) 75 MCG tablet Take 1 tablet by mouth every morning (before breakfast) Yes Myriam Allen MD   hydroCHLOROthiazide (HYDRODIURIL) 25 MG tablet Take 0.5 tablets by mouth daily Yes Myriam Allen MD   naproxen (NAPROSYN) 500 MG tablet Take 1 tablet by mouth 2 times daily (with meals) Yes Myriam Allen MD       [facility] (Including outside providers/suppliers regularly involved in providing care):   Patient Care Team:  Myriam Allen MD as PCP - Lavelle Galdamez MD as PCP - [facility] Empaneled Provider     Reviewed and updated this visit:  Tobacco  Allergies  Meds  Problems  Med Hx  Surg Hx  Soc Hx  Fam Hx

## 2022-06-22 NOTE — PATIENT INSTRUCTIONS
Personalized Preventive Plan for Don Lloyd - 6/22/2022  Medicare offers a range of preventive health benefits. Some of the tests and screenings are paid in full while other may be subject to a deductible, co-insurance, and/or copay. Some of these benefits include a comprehensive review of your medical history including lifestyle, illnesses that may run in your family, and various assessments and screenings as appropriate. After reviewing your medical record and screening and assessments performed today your provider may have ordered immunizations, labs, imaging, and/or referrals for you. A list of these orders (if applicable) as well as your Preventive Care list are included within your After Visit Summary for your review. Other Preventive Recommendations:    · A preventive eye exam performed by an eye specialist is recommended every 1-2 years to screen for glaucoma; cataracts, macular degeneration, and other eye disorders. · A preventive dental visit is recommended every 6 months. · Try to get at least 150 minutes of exercise per week or 10,000 steps per day on a pedometer . · Order or download the FREE \"Exercise & Physical Activity: Your Everyday Guide\" from [employer]. Call 1-301.164.9561 or search [employer] online. · You need 6805-4214 mg of calcium and 1149-4826 IU of vitamin D per day. It is possible to meet your calcium requirement with diet alone, but a vitamin D supplement is usually necessary to meet this goal.  · When exposed to the sun, use a sunscreen that protects against both UVA and UVB radiation with an SPF of 30 or greater. Reapply every 2 to 3 hours or after sweating, drying off with a towel, or swimming. · Always wear a seat belt when traveling in a car. Always wear a helmet when riding a bicycle or motorcycle.

## 2022-06-23 LAB
BASOPHILS # BLD: 0 K/UL (ref 0–0.2)
BASOPHILS NFR BLD: 1 % (ref 0–2)
DIFFERENTIAL METHOD BLD: NORMAL
EOSINOPHIL # BLD: 0.1 K/UL (ref 0–0.8)
EOSINOPHIL NFR BLD: 2 % (ref 0.5–7.8)
ERYTHROCYTE [DISTWIDTH] IN BLOOD BY AUTOMATED COUNT: 13.2 % (ref 11.9–14.6)
HCT VFR BLD AUTO: 41.7 % (ref 35.8–46.3)
HGB BLD-MCNC: 13.7 G/DL (ref 11.7–15.4)
IMM GRANULOCYTES # BLD AUTO: 0 K/UL (ref 0–0.5)
IMM GRANULOCYTES NFR BLD AUTO: 0 % (ref 0–5)
LYMPHOCYTES # BLD: 2 K/UL (ref 0.5–4.6)
LYMPHOCYTES NFR BLD: 31 % (ref 13–44)
MCH RBC QN AUTO: 31.5 PG (ref 26.1–32.9)
MCHC RBC AUTO-ENTMCNC: 32.9 G/DL (ref 31.4–35)
MCV RBC AUTO: 95.9 FL (ref 79.6–97.8)
MONOCYTES # BLD: 0.7 K/UL (ref 0.1–1.3)
MONOCYTES NFR BLD: 11 % (ref 4–12)
NEUTS SEG # BLD: 3.6 K/UL (ref 1.7–8.2)
NEUTS SEG NFR BLD: 55 % (ref 43–78)
NRBC # BLD: 0 K/UL (ref 0–0.2)
PLATELET # BLD AUTO: 181 K/UL (ref 150–450)
PMV BLD AUTO: 11.4 FL (ref 9.4–12.3)
RBC # BLD AUTO: 4.35 M/UL (ref 4.05–5.2)
WBC # BLD AUTO: 6.5 K/UL (ref 4.3–11.1)

## 2022-06-24 ENCOUNTER — HOSPITAL ENCOUNTER (OUTPATIENT)
Dept: PHYSICAL THERAPY | Age: 82
Setting detail: RECURRING SERIES
Discharge: HOME | End: 2022-06-27
Payer: MEDICARE

## 2022-06-24 PROCEDURE — 97140 MANUAL THERAPY 1/> REGIONS: CPT

## 2022-06-24 PROCEDURE — 97110 THERAPEUTIC EXERCISES: CPT

## 2022-06-24 ASSESSMENT — PAIN SCALES - GENERAL: PAINLEVEL_OUTOF10: 2

## 2022-06-24 NOTE — PROGRESS NOTES
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)  Cervicalgia (M54.2)  Medical/Referring Diagnosis:  Pain in left shoulder [M25.512]  Referring Physician:  Alonso Fuentes MD MD Orders:  PT Eval and Treat   Date of Onset:  Onset Date: 22 (insidious onset of bilateral shoulder pain)     Allergies:   Penicillins  Restrictions/Precautions:  Restrictions/Precautions: None  No data recorded   Interventions Planned (Treatment may consist of any combination of the following):    Current Treatment Recommendations: Strengthening; ROM; Functional mobility training; ADL/Self-care training; Manual Therapy - Soft Tissue Mobilization; Manual Therapy - Joint Manipulation; Pain management; Home exercise program; Safety education & training; Modalities; Positioning; Integrated dry needling; Therapeutic activities     Subjective Comments:  Pt. reported going bowling yesterday without any issues.   Pt. stated any other movement greater than 90 degrees there is pain  Initial:}    2/10Post Session:       0/10  Medications Last Reviewed:  2022  Updated Objective Findings:  Pt. had a knot on her right shoulder in the upper trap region with manual   Treatment   THERAPEUTIC EXERCISE: (25 minutes):    Exercises per grid below to improve mobility, strength, coordination and dynamic movement of left shoulder to improve functional lifting, carrying, reaching and overhead activites. Required moderate visual, verbal, manual and tactile cues to promote proper body alignment, promote proper body posture and promote proper body mechanics. Progressed resistance, range, repetitions and complexity of movement as indicated. Date:  6/20/2022 Date:  6/24/22 Date:     Activity/Exercise Parameters Parameters Parameters   Scapular retractions X 10 2x10 reps     Backwards shoulder rolls X 10 X 20 reps     Upper trapezius stretch X 2 4x30 sec hold     Levator scapula stretch X 2 4x30 sec hold     UBE   Level 1 x 3 mins fwd & 3 mins bkwd    Rows & low rows   Yellow band 2x10 reps each with 5 sec hold                                                 Time spent with patient reviewing proper muscle recruitment and technique with exercises. MANUAL THERAPY: (30 minutes):   Joint mobilization and Soft tissue mobilization was utilized and necessary because of the patient's restricted joint motion, painful spasm, loss of articular motion and restricted motion of soft tissue. · Supine gentle cervical traction to improve relaxation  · Gentle bilateral upper trapezius and levator scapula stretching to improve mobility and decrease tension  · Trigger point release to bilateral upper trapezius to decrease tightness and pain  · PROM to left shoulder in all planes with gentle distraction and mild oscillation     MODALITIES: (0 minutes):        patient denied heat or ice today     HEP: As above; handouts given to patient for all exercises. Treatment/Session Summary:    · Treatment Assessment: Pt had no pain at the end of today's session and was compliant with all exercises     · Communication/Consultation:  Spoke with patient in regards to HEP, safety at home, plan of care, and progression of therapy session. · Equipment provided today:  Patient given handout with above exercises for home.   · Recommendations/Intent for next treatment session: Next visit will focus on pain control, manual therapy and modalities as needed, progression of postural education and exercise as tolerated.     Total Treatment [place] Duration:  50 minutes   Time In: 0800  Time Out: 0900    MARCO BARNHART PTA       Charge Capture  }Post Session Pain  PT Visit Info  [employer] Portal  MD Guidelines  Scanned Media  Benefits  MyChart

## 2022-06-29 ENCOUNTER — HOSPITAL ENCOUNTER (OUTPATIENT)
Dept: PHYSICAL THERAPY | Age: 82
Setting detail: RECURRING SERIES
Discharge: HOME | End: 2022-07-02
Payer: MEDICARE

## 2022-06-29 PROCEDURE — 97140 MANUAL THERAPY 1/> REGIONS: CPT

## 2022-06-29 PROCEDURE — 97110 THERAPEUTIC EXERCISES: CPT

## 2022-06-29 ASSESSMENT — PAIN SCALES - GENERAL: PAINLEVEL_OUTOF10: 0

## 2022-06-29 NOTE — PROGRESS NOTES
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)  Cervicalgia (M54.2)  Medical/Referring Diagnosis:  Pain in left shoulder [M25.512]  Referring Physician:  Allyssa Gusman MD MD Orders:  PT Eval and Treat   Date of Onset:  Onset Date: 22 (insidious onset of bilateral shoulder pain)     Allergies:   Penicillins  Restrictions/Precautions:  Restrictions/Precautions: None  No data recorded   Interventions Planned (Treatment may consist of any combination of the following):    Current Treatment Recommendations: Strengthening; ROM; Functional mobility training; ADL/Self-care training; Manual Therapy - Soft Tissue Mobilization; Manual Therapy - Joint Manipulation; Pain management; Home exercise program; Safety education & training; Modalities; Positioning; Integrated dry needling; Therapeutic activities     Subjective Comments:  Patient reports the pain being better when she takes the medication. States she has good and bad days. Some pain in R upper trapezius yesterday. . No complaints of pain this morning. Initial:}    0/10Post Session:       0/10  Medications Last Reviewed:  2022  Updated Objective Findings:  Trigger points bilateral upper trapezius.  Tightness with cervical side glides R to L lower cervical  Treatment   THERAPEUTIC EXERCISE: (8 minutes):    Exercises per grid below to improve mobility, strength, coordination and dynamic movement of left shoulder to improve functional lifting, carrying, reaching and overhead activites. Required moderate visual, verbal, manual and tactile cues to promote proper body alignment, promote proper body posture and promote proper body mechanics. Progressed resistance, range, repetitions and complexity of movement as indicated. Date:  6/20/2022 Date:  6/24/22 Date:  6/29/2022   Activity/Exercise Parameters Parameters Parameters   Scapular retractions X 10 2x10 reps  2 x 10   Backwards shoulder rolls X 10 X 20 reps  2 x 10   Upper trapezius stretch X 2 4x30 sec hold  reviewed   Levator scapula stretch X 2 4x30 sec hold  reviewed   UBE   Level 1 x 3 mins fwd & 3 mins bkwd ---   Rows & low rows   Yellow band 2x10 reps each with 5 sec hold  ---                                               Time spent with patient reviewing proper muscle recruitment and technique with exercises. MANUAL THERAPY: (45 minutes):   Joint mobilization and Soft tissue mobilization was utilized and necessary because of the patient's restricted joint motion, painful spasm, loss of articular motion and restricted motion of soft tissue. · Supine gentle cervical traction to improve relaxation  · Gentle bilateral upper trapezius and levator scapula stretching to improve mobility and decrease tension  · Trigger point release to bilateral upper trapezius to decrease tightness and pain  · Cervical side glides bilaterally to decrease pain and improve mobility  · Supine posterior to anterior upper thoracic and cervical mobilizations to improve mobility    MODALITIES: (0 minutes):        patient denied heat or ice today     HEP: As above; handouts given to patient for all exercises. Treatment/Session Summary:    · Treatment Assessment:   Patient with improved joint and tissue mobility following manual therapy.  Minor soreness from manual, but no pain at end of session  · Communication/Consultation: None today  · Equipment provided today:  None today  · Recommendations/Intent for next treatment session: Next visit will focus on pain control, manual therapy and modalities as needed, progression of postural education and exercise as tolerated.     Total Treatment [place] Duration:  53 minutes   Time In: 0803  Time Out: 0900    Yessica Plunkett, PT       Charge Capture  }Post Session Pain  PT Visit Info  [employer] Portal  MD Guidelines  Scanned Media  Benefits  MyChart

## 2022-07-01 ENCOUNTER — HOSPITAL ENCOUNTER (OUTPATIENT)
Dept: PHYSICAL THERAPY | Age: 82
Setting detail: RECURRING SERIES
Discharge: HOME | End: 2022-07-04
Payer: MEDICARE

## 2022-07-01 PROCEDURE — 97110 THERAPEUTIC EXERCISES: CPT

## 2022-07-01 PROCEDURE — 97140 MANUAL THERAPY 1/> REGIONS: CPT

## 2022-07-01 ASSESSMENT — PAIN SCALES - GENERAL: PAINLEVEL_OUTOF10: 1

## 2022-07-01 NOTE — PROGRESS NOTES
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)  Cervicalgia (M54.2)  Medical/Referring Diagnosis:  Pain in left shoulder [M25.512]  Referring Physician:  Shannon Craven MD MD Orders:  PT Eval and Treat   Date of Onset:  Onset Date: 22 (insidious onset of bilateral shoulder pain)     Allergies:   Penicillins  Restrictions/Precautions:  Restrictions/Precautions: None  No data recorded   Interventions Planned (Treatment may consist of any combination of the following):    Current Treatment Recommendations: Strengthening; ROM; Functional mobility training; ADL/Self-care training; Manual Therapy - Soft Tissue Mobilization; Manual Therapy - Joint Manipulation; Pain management; Home exercise program; Safety education & training; Modalities; Positioning; Integrated dry needling; Therapeutic activities     Subjective Comments:Pt. Complained of left sided neck pain today and denies sleeping on that side. Pt. Stated she had minimal tricep pian on left side     Initial:}    1/10Post Session:        /10  Medications Last Reviewed:  2022  Updated Objective Findings:  Trigger points bilateral upper trapezius.  Tightness with cervical side glides R to L lower cervical  Treatment   THERAPEUTIC EXERCISE: (30 minutes):    Exercises per grid below to improve mobility, strength, coordination and dynamic movement of left shoulder to improve functional lifting, carrying, reaching and overhead activites. Required moderate visual, verbal, manual and tactile cues to promote proper body alignment, promote proper body posture and promote proper body mechanics. Progressed resistance, range, repetitions and complexity of movement as indicated. Date:  7/1/2022 Date:  6/24/22 Date:  6/29/2022   Activity/Exercise Parameters Parameters Parameters   Scapular retractions 2 X 10 2x10 reps  2 x 10   Backwards shoulder rolls 2 X 10 X 20 reps  2 x 10   Upper trapezius stretch 4x30 sec hold  4x30 sec hold  reviewed   Levator scapula stretch 4x30 sec hold  4x30 sec hold  reviewed   UBE  Level 1 x 4 mins fwd & 4 mins bkwd Level 1 x 3 mins fwd & 3 mins bkwd ---   Rows & low rows  Yellow band 2x10 reps each with 5 sec hold  Yellow band 2x10 reps each with 5 sec hold  ---                                               Time spent with patient reviewing proper muscle recruitment and technique with exercises. MANUAL THERAPY: (25 minutes):   Joint mobilization and Soft tissue mobilization was utilized and necessary because of the patient's restricted joint motion, painful spasm, loss of articular motion and restricted motion of soft tissue. · Supine gentle cervical traction to improve relaxation  · Gentle bilateral upper trapezius and levator scapula stretching to improve mobility and decrease tension  · Trigger point release to bilateral upper trapezius to decrease tightness and pain    MODALITIES: (0 minutes):        patient denied heat or ice today     HEP: As above; handouts given to patient for all exercises.       Treatment/Session Summary:    · Treatment Assessment:   Pt. was compliant with all exercises and reported feeling much better  · Communication/Consultation:  None today  · Equipment provided today:  None today  · Recommendations/Intent for next treatment session: Next visit will focus on pain control, manual therapy and modalities as

## 2022-07-06 ENCOUNTER — HOSPITAL ENCOUNTER (OUTPATIENT)
Dept: PHYSICAL THERAPY | Age: 82
Setting detail: RECURRING SERIES
Discharge: HOME | End: 2022-07-09
Payer: MEDICARE

## 2022-07-06 PROCEDURE — 97140 MANUAL THERAPY 1/> REGIONS: CPT

## 2022-07-06 PROCEDURE — 97110 THERAPEUTIC EXERCISES: CPT

## 2022-07-06 ASSESSMENT — PAIN SCALES - GENERAL: PAINLEVEL_OUTOF10: 2

## 2022-07-06 NOTE — PROGRESS NOTES
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)  Cervicalgia (M54.2)  Medical/Referring Diagnosis:  Pain in left shoulder [M25.512]  Referring Physician:  Marylou Alvarado MD MD Orders:  PT Eval and Treat   Date of Onset:  Onset Date: 22 (insidious onset of bilateral shoulder pain)     Allergies:   Penicillins  Restrictions/Precautions:  Restrictions/Precautions: None  No data recorded   Interventions Planned (Treatment may consist of any combination of the following):    Current Treatment Recommendations: Strengthening; ROM; Functional mobility training; ADL/Self-care training; Manual Therapy - Soft Tissue Mobilization; Manual Therapy - Joint Manipulation; Pain management; Home exercise program; Safety education & training; Modalities; Positioning; Integrated dry needling; Therapeutic activities     Subjective Comments:  Patient with some tightness in her R upper trapezius region this morning. States it feels tender. Initial:}    2/10Post Session:        /10  Medications Last Reviewed:  2022  Updated Objective Findings:  Trigger point R upper trapezius  Treatment   THERAPEUTIC EXERCISE: (13 minutes):    Exercises per grid below to improve mobility, strength, coordination and dynamic movement of left shoulder to improve functional lifting, carrying, reaching and overhead activites.   Required moderate visual, verbal, manual and tactile cues to promote proper body alignment, promote proper body posture and promote proper body mechanics. Progressed resistance, range, repetitions and complexity of movement as indicated. Date:  7/1/2022 Date:  7/6/2022 Date:  6/29/2022   Activity/Exercise Parameters Parameters Parameters   Scapular retractions 2 X 10 2 x 10 2 x 10   Backwards shoulder rolls 2 X 10 2 x 10  2 x 10   Upper trapezius stretch 4x30 sec hold  3 x 30 seconds reviewed   Levator scapula stretch 4x30 sec hold  3 x 30 seconds  reviewed   UBE  Level 1 x 4 mins fwd & 4 mins bkwd --- ---   Rows & low rows  Yellow band 2x10 reps each with 5 sec hold  --- ---                                               Time spent with patient reviewing proper muscle recruitment and technique with exercises. MANUAL THERAPY: (30 minutes):   Joint mobilization and Soft tissue mobilization was utilized and necessary because of the patient's restricted joint motion, painful spasm, loss of articular motion and restricted motion of soft tissue. · Supine gentle cervical traction to improve relaxation  · Gentle bilateral upper trapezius and levator scapula stretching to improve mobility and decrease tension  · Trigger point release to bilateral upper trapezius to decrease tightness and pain    MODALITIES: (0 minutes):        patient denied heat or ice today     HEP: As above; handouts given to patient for all exercises. Treatment/Session Summary:    · Treatment Assessment:   Decreased tightness in R upper trapezius after manual. Improving with upright posture. · Communication/Consultation:  None today  · Equipment provided today:  None today  · Recommendations/Intent for next treatment session: Next visit will focus on pain control, manual therapy and modalities as needed, progression of postural education and exercise as tolerated.     Total Treatment [place] Duration:  48 minutes   Time In: 3392  Time Out: 3631    [place] Barbara Montilla, PT       Charge Capture  }Post Session Pain  PT Visit Info  [facility] Portal  MD Guidelines  Scanned Media  Benefits  [employer]

## 2022-07-11 ENCOUNTER — HOSPITAL ENCOUNTER (OUTPATIENT)
Dept: PHYSICAL THERAPY | Age: 82
Setting detail: RECURRING SERIES
Discharge: HOME | End: 2022-07-14
Payer: MEDICARE

## 2022-07-11 PROCEDURE — 97110 THERAPEUTIC EXERCISES: CPT

## 2022-07-11 PROCEDURE — 97140 MANUAL THERAPY 1/> REGIONS: CPT

## 2022-07-11 ASSESSMENT — PAIN SCALES - GENERAL: PAINLEVEL_OUTOF10: 1

## 2022-07-11 NOTE — PROGRESS NOTES
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)  Cervicalgia (M54.2)  Medical/Referring Diagnosis:  Pain in left shoulder [M25.512]  Referring Physician:  Joaquin Berumen MD MD Orders:  PT Eval and Treat   Date of Onset:  Onset Date: 22 (insidious onset of bilateral shoulder pain)     Allergies:   Penicillins  Restrictions/Precautions:  Restrictions/Precautions: None  No data recorded   Interventions Planned (Treatment may consist of any combination of the following):    Current Treatment Recommendations: Strengthening; ROM; Functional mobility training; ADL/Self-care training; Manual Therapy - Soft Tissue Mobilization; Manual Therapy - Joint Manipulation; Pain management; Home exercise program; Safety education & training; Modalities; Positioning; Integrated dry needling; Therapeutic activities     Subjective Comments:  Pt. reported minmal improvement with decreased tightness in bilateral shoulders and less neck tightness  Initial:}    10Post Session:       0/10  Medications Last Reviewed:  2022  Updated Objective Findings:  less tightness in bilateral neck and shoulders  Treatment   THERAPEUTIC EXERCISE: (40 minutes):    Exercises per grid below to improve mobility, strength, coordination and dynamic movement of left shoulder to improve functional lifting, carrying, reaching and overhead activites. Required moderate visual, verbal, manual and tactile cues to promote proper body alignment, promote proper body posture and promote proper body mechanics. Progressed resistance, range, repetitions and complexity of movement as indicated. Date:  7/1/2022 Date:  7/6/2022 Date:  7/11/22   Activity/Exercise Parameters Parameters Parameters   Scapular retractions 2 X 10 2 x 10 2 x 10   Backwards shoulder rolls 2 X 10 2 x 10  2 x 10   Upper trapezius stretch 4x30 sec hold  3 x 30 seconds 4x30 sec hold    Levator scapula stretch 4x30 sec hold  3 x 30 seconds  4x30 sec hold   UBE  Level 1 x 4 mins fwd & 4 mins bkwd --- Level 2 x 4 mins fwd & 4 mins bkwd. Rows & low rows  Yellow band 2x10 reps each with 5 sec hold  --- Red band 2x10 reps each with 5 sec hold    pec stretch   4x30 sec hold at doorway   TRX   Flexion & extension x 20 reps                                   Time spent with patient reviewing proper muscle recruitment and technique with exercises. MANUAL THERAPY: (15 minutes):   Joint mobilization and Soft tissue mobilization was utilized and necessary because of the patient's restricted joint motion, painful spasm, loss of articular motion and restricted motion of soft tissue. · Supine gentle cervical traction to improve relaxation  · Gentle bilateral upper trapezius and levator scapula stretching to improve mobility and decrease tension  · Trigger point release to bilateral upper trapezius to decrease tightness and pain    MODALITIES: (0 minutes):        patient denied heat or ice today     HEP: As above; handouts given to patient for all exercises.       Treatment/Session Summary:    · Treatment Assessment:   Pt. reported no pain at the end of session today  · Communication/Consultation:  None today  · Equipment provided today:  None today  · Recommendations/Intent for next treatment session: Next visit will focus on pain control, manual therapy and modalities as needed, progression of postural education and exercise as tolerated.     Total Treatment [place] Duration:  55 minutes   Time In: 0900  Time Out: 1000    MARCO BARNHART PTA       Charge Capture  }Post Session Pain  PT Visit Info  [facility] Portal  MD Guidelines  Scanned Media  Benefits  Crediihart

## 2022-07-13 LAB
ALBUMIN SERPL-MCNC: 3.7 G/DL (ref 3.5–5)
ALBUMIN/GLOB SERPL: 1 {RATIO} (ref 1.1–2.2)
ALP SERPL-CCNC: 53 U/L (ref 50–136)
ALT SERPL-CCNC: 42 U/L (ref 30–65)
ANION GAP SERPL CALC-SCNC: 8 MMOL/L (ref 5–15)
AST SERPL-CCNC: 33 U/L (ref 15–37)
BILIRUB SERPL-MCNC: 0.6 MG/DL
BUN SERPL-MCNC: 17 MG/DL (ref 6–20)
CALCIUM SERPL-MCNC: 10.1 MG/DL (ref 8.5–10.1)
CHLORIDE SERPL-SCNC: 102 MMOL/L (ref 97–108)
CO2 SERPL-SCNC: 32 MMOL/L (ref 21–32)
CREAT SERPL-MCNC: 0.7 MG/DL (ref 0.55–1.02)
GLOBULIN SER CALC-MCNC: 3.7 G/DL (ref 2–4)
GLUCOSE SERPL-MCNC: 63 MG/DL (ref 50–100)
POTASSIUM SERPL-SCNC: 3.6 MMOL/L (ref 3.5–5.1)
PROT SERPL-MCNC: 7.4 G/DL (ref 6.4–8.2)
SODIUM SERPL-SCNC: 142 MMOL/L (ref 136–145)

## 2022-07-15 ENCOUNTER — HOSPITAL ENCOUNTER (OUTPATIENT)
Dept: PHYSICAL THERAPY | Age: 82
Setting detail: RECURRING SERIES
Discharge: HOME | End: 2022-07-18
Payer: MEDICARE

## 2022-07-15 PROCEDURE — 97140 MANUAL THERAPY 1/> REGIONS: CPT

## 2022-07-15 PROCEDURE — 97110 THERAPEUTIC EXERCISES: CPT

## 2022-07-15 ASSESSMENT — PAIN SCALES - GENERAL: PAINLEVEL_OUTOF10: 1

## 2022-07-15 NOTE — PROGRESS NOTES
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)  Cervicalgia (M54.2)  Medical/Referring Diagnosis:  Pain in left shoulder [M25.512]  Referring Physician:  Baljinder Villalobos MD MD Orders:  PT Eval and Treat   Date of Onset:  Onset Date: 22 (insidious onset of bilateral shoulder pain)     Allergies:   Penicillins  Restrictions/Precautions:  Restrictions/Precautions: None  No data recorded   Interventions Planned (Treatment may consist of any combination of the following):    Current Treatment Recommendations: Strengthening; ROM; Functional mobility training; ADL/Self-care training; Manual Therapy - Soft Tissue Mobilization; Manual Therapy - Joint Manipulation; Pain management; Home exercise program; Safety education & training; Modalities; Positioning; Integrated dry needling; Therapeutic activities     Subjective Comments:  Patient reports shoulder pain changes from right to left  Initial:}    1/10Post Session:       0/10  Medications Last Reviewed:  7/15/2022  Updated Objective Findings:   less tightness in bilateral neck and shoulders  Treatment   THERAPEUTIC EXERCISE: (25 minutes):    Exercises per grid below to improve mobility, strength, coordination and dynamic movement of left shoulder to improve functional lifting, carrying, reaching and overhead activites.   Required moderate visual, verbal, manual and tactile cues to promote proper body alignment, promote proper body posture and promote proper body mechanics. Progressed resistance, range, repetitions and complexity of movement as indicated. Date:  7/15/2022 Date:  7/6/2022 Date:  7/11/22   Activity/Exercise Parameters Parameters Parameters   Scapular retractions 1/2 roll 3 X 10 2 x 10 2 x 10   Backwards shoulder rolls 3 X 15 2 x 10  2 x 10   Upper trapezius stretch 5 x 20 sec 3 x 30 seconds 4x30 sec hold    Levator scapula stretch 5 x 20 sec 3 x 30 seconds  4x30 sec hold   UBE  Level 1 x 4 mins fwd & 4 mins bkwd --- Level 2 x 4 mins fwd & 4 mins bkwd. Rows & low rows  Red  band 3x10   --- Red band 2x10 reps each with 5 sec hold    pec stretch   4x30 sec hold at doorway   TRX   Flexion & extension x 20 reps                                   Time spent with patient reviewing proper muscle recruitment and technique with exercises. MANUAL THERAPY: (30 minutes):   Joint mobilization and Soft tissue mobilization was utilized and necessary because of the patient's restricted joint motion, painful spasm, loss of articular motion and restricted motion of soft tissue. Supine gentle cervical traction to improve relaxation  Gentle bilateral upper trapezius and levator scapula stretching to improve mobility and decrease tension  Trigger point release to bilateral upper trapezius to decrease tightness and pain    MODALITIES: (0 minutes):        patient denied heat or ice today      HEP: As above; handouts given to patient for all exercises. Treatment/Session Summary:    Treatment Assessment:   Patient reported decreased pain and tightness after session  Communication/Consultation:  None today  Equipment provided today:  None today  Recommendations/Intent for next treatment session: Next visit will focus on pain control, manual therapy and modalities as needed, progression of postural education and exercise as tolerated.     Total Treatment [place] Duration:

## 2022-07-18 ENCOUNTER — HOSPITAL ENCOUNTER (OUTPATIENT)
Dept: PHYSICAL THERAPY | Age: 82
Setting detail: RECURRING SERIES
Discharge: HOME | End: 2022-07-21
Payer: MEDICARE

## 2022-07-18 PROCEDURE — 97110 THERAPEUTIC EXERCISES: CPT

## 2022-07-18 PROCEDURE — 97140 MANUAL THERAPY 1/> REGIONS: CPT

## 2022-07-18 ASSESSMENT — PAIN SCALES - GENERAL: PAINLEVEL_OUTOF10: 1

## 2022-07-18 NOTE — PROGRESS NOTES
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)  Cervicalgia (M54.2)  Medical/Referring Diagnosis:  Pain in left shoulder [M25.512]  Referring Physician:  Phoebe Tinoco MD MD Orders:  PT Eval and Treat   Date of Onset:  Onset Date: 22 (insidious onset of bilateral shoulder pain)     Allergies:   Penicillins  Restrictions/Precautions:  Restrictions/Precautions: None  No data recorded   Interventions Planned (Treatment may consist of any combination of the following):    Current Treatment Recommendations: Strengthening; ROM; Functional mobility training; ADL/Self-care training; Manual Therapy - Soft Tissue Mobilization; Manual Therapy - Joint Manipulation; Pain management; Home exercise program; Safety education & training; Modalities; Positioning; Integrated dry needling; Therapeutic activities     Subjective Comments:   Patient reports some tightness and pain in her L side this morning. States it still moves around from side to side.   Initial:}    1/10Post Session:       0/10  Medications Last Reviewed:  2022  Updated Objective Findings:   Trigger points bilateral upper trapezius and levator scapula today  Treatment   THERAPEUTIC EXERCISE: (15 minutes):    Exercises per grid below to improve mobility, strength, coordination and dynamic movement of left shoulder to improve functional lifting, carrying, reaching and overhead activites. Required moderate visual, verbal, manual and tactile cues to promote proper body alignment, promote proper body posture and promote proper body mechanics. Progressed resistance, range, repetitions and complexity of movement as indicated. Date:  7/15/2022 Date:  7/18/2022 Date:  7/11/22   Activity/Exercise Parameters Parameters Parameters   Scapular retractions 1/2 roll 3 X 10 2 x 10 2 x 10   Backwards shoulder rolls 3 X 15 2 x 10  2 x 10   Upper trapezius stretch 5 x 20 sec 3 x 30 seconds 4x30 sec hold    Levator scapula stretch 5 x 20 sec 3 x 30 seconds  4x30 sec hold   UBE  Level 1 x 4 mins fwd & 4 mins bkwd --- Level 2 x 4 mins fwd & 4 mins bkwd. Rows & low rows  Red  band 3x10   Red, 2 x 10 each Red band 2x10 reps each with 5 sec hold    pec stretch  Doorway- discontinued 4x30 sec hold at doorway   TRX  --- Flexion & extension x 20 reps                                   Time spent with patient reviewing proper muscle recruitment and technique with exercises. MANUAL THERAPY: (38 minutes):   Joint mobilization and Soft tissue mobilization was utilized and necessary because of the patient's restricted joint motion, painful spasm, loss of articular motion and restricted motion of soft tissue. Supine gentle cervical traction to improve relaxation  Gentle bilateral upper trapezius and levator scapula stretching to improve mobility and decrease tension  Trigger point release to bilateral upper trapezius to decrease tightness and pain  Supine posterior to anterior upper thoracic and lower cervical joint mobilizations    MODALITIES: (0 minutes):        patient denied heat or ice today      HEP: As above; handouts given to patient for all exercises. Treatment/Session Summary:    Treatment Assessment:   Patient with decreased tightness and pain at end of session.  Discontinued doorway pec stretch due to discomfort and will try a different stretch next session. Communication/Consultation:  None today  Equipment provided today:  None today  Recommendations/Intent for next treatment session: Next visit will focus on pain control, manual therapy and modalities as needed, progression of postural education and exercise as tolerated.     Total Treatment [place] Duration:  53 minutes   Time In: 0802  Time Out: 4351    Will Ginger, PT       Charge Capture  }Post Session Pain  PT Visit Info  [facility] Portal  MD Guidelines  Scanned Media  Benefits  Callystrohart

## 2022-07-20 ENCOUNTER — HOSPITAL ENCOUNTER (OUTPATIENT)
Dept: PHYSICAL THERAPY | Age: 82
Setting detail: RECURRING SERIES
Discharge: HOME | End: 2022-07-23
Payer: MEDICARE

## 2022-07-20 PROCEDURE — 97110 THERAPEUTIC EXERCISES: CPT

## 2022-07-20 PROCEDURE — 97140 MANUAL THERAPY 1/> REGIONS: CPT

## 2022-07-20 ASSESSMENT — PAIN SCALES - GENERAL: PAINLEVEL_OUTOF10: 0

## 2022-07-20 NOTE — PROGRESS NOTES
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)  Cervicalgia (M54.2)  Medical/Referring Diagnosis:  Pain in left shoulder [M25.512]  Referring Physician:  Wilda Galicia MD MD Orders:  PT Eval and Treat   Date of Onset:  Onset Date: 22 (insidious onset of bilateral shoulder pain)     Allergies:   Penicillins  Restrictions/Precautions:  Restrictions/Precautions: None  No data recorded   Interventions Planned (Treatment may consist of any combination of the following):    Current Treatment Recommendations: Strengthening; ROM; Functional mobility training; ADL/Self-care training; Manual Therapy - Soft Tissue Mobilization; Manual Therapy - Joint Manipulation; Pain management; Home exercise program; Safety education & training; Modalities; Positioning; Integrated dry needling; Therapeutic activities     Subjective Comments:   Patient with no complaints of pain this morning and reports feeling good overall.  Thinks she would like to be on hold and see how she does on her own at home  Initial:}    0/10Post Session:       0/10  Medications Last Reviewed:  2022  Updated Objective Findings:   See Progress Note/ 2 week Hold from today  Treatment   THERAPEUTIC EXERCISE: (13 minutes):    Exercises per grid below to improve mobility, strength, coordination and dynamic movement of left shoulder to improve functional lifting, carrying, reaching and overhead activites. Required moderate visual, verbal, manual and tactile cues to promote proper body alignment, promote proper body posture and promote proper body mechanics. Progressed resistance, range, repetitions and complexity of movement as indicated. Date:  7/15/2022 Date:  7/18/2022 Date:  7/20/2022   Activity/Exercise Parameters Parameters Parameters   Scapular retractions 1/2 roll 3 X 10 2 x 10 3 x 10   Backwards shoulder rolls 3 X 15 2 x 10  3 x 10   Upper trapezius stretch 5 x 20 sec 3 x 30 seconds reviewed   Levator scapula stretch 5 x 20 sec 3 x 30 seconds  reviewed   Chin tucks --- --- 2 x 10   UBE  Level 1 x 4 mins fwd & 4 mins bkwd --- ---   Rows & low rows  Red  band 3x10   Red, 2 x 10 each ---   TRX  --- ---                                   Time spent with patient reviewing proper muscle recruitment and technique with exercises. MANUAL THERAPY: (40 minutes):   Joint mobilization and Soft tissue mobilization was utilized and necessary because of the patient's restricted joint motion, painful spasm, loss of articular motion and restricted motion of soft tissue. Supine gentle cervical traction to improve relaxation  Gentle bilateral upper trapezius and levator scapula stretching to improve mobility and decrease tension  Trigger point release to bilateral upper trapezius to decrease tightness and pain  Supine posterior to anterior upper thoracic and lower cervical joint mobilizations    MODALITIES: (0 minutes):        patient denied heat or ice today      HEP: As above; handouts given to patient for all exercises. Treatment/Session Summary:    Treatment Assessment:   Patient with no pain at end of session. She has improved ROM and decreased tightness overall. Patient safe to be on hold and try exercises at home. Patient to call if wishing to return for additional therapy visits.   Communication/Consultation:  None today  Equipment provided today:  None today  Recommendations/Intent for next treatment session: Patient on hold.     Total Treatment [place] Duration:  53 minutes   Time In: 0802  Time Out: 8912    Harvinder Velasco, PT       Charge Capture  }Post Session Pain  PT Visit Info  [facility] Portal  MD Guidelines  Scanned Media  Benefits  MyChart

## 2022-07-20 NOTE — PROGRESS NOTES
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)    Cervicalgia (M54.2)  Medical/Referring Diagnosis:  Pain in left shoulder [M25.512]  Referring Physician:  Sharon Garrison MD MD Orders:  PT Eval and Treat   Return MD Appt: as needed  Date of Onset:  Onset Date: 22 (insidious onset of bilateral shoulder pain)     Allergies:  Penicillins  Restrictions/Precautions:    Restrictions/Precautions: None  No data recorded   Medications Last Reviewed:  2022      OBJECTIVE     Patient denies any headaches, changes in vision, dizziness, vertigo, nausea, drop attacks, black outs, tinnitus, dysphagia, dysarthria, LE symptoms or bowel/bladder dysfunction. Observation/Orthostatic Postural Assessment:          Patient with forward head, rounded shoulders, increased thoracic kyphosis, scapular protraction bilaterally, R scapular winging and elevation noted. Increased tension through bilateral upper trapezius. Palpation:          Moderate (from moderate to significant) tenderness and tightness through bilateral upper trapezius, levator scapula, cervical paraspinals. Minimal tenderness to palpation of bilateral glenohumeral and acromioclavicular joints.     ROM:    AROM/ PROM Left (degrees) Right (degrees)   Shoulder Flexion 150 145   Shoulder Abduction 145 140   Shoulder Internal Rotation 80 70   Functional Internal Rotation L1 L1   Shoulder External Rotation 65 75   Functional External Rotation T2 T2     AROM/ PROM Degrees   Cervical Flexion 75 (from 60)   Cervical Extension 40   Right Rotation 65 (from 50)   Left Rotation 60 (from 35)   Right Lateral Flexion 50 (from 40)   Left Lateral Flexion 45 (from 35)     Vertebral-Basilar Screen: Positional Provocation testing is negative. Strength:          No pain reproduction with UE resistance testing  Motion Tested Left   (*/5) Right  (*/5)   Shoulder Flexion 5 5   Scaption 5 5 (from 4+)   Shoulder Abduction 5 5   Shoulder Internal Rotation  5 5   Shoulder External Rotation 5 5   Elbow Flexion 5 5   Elbow Extension 5 5     Special Tests:     Impingement tests:  Malik-Pernell test: negative bilaterally  Neer test: positive on R  Rotator Cuff:  Drop sign: negative bilaterally  Stability tests:  Sulcus sign: negative bilaterally  Apprehension test: negative bilaterally  Step Deformity: negative bilaterally  Glenoid labrum integrity tests:  Grand active compression test: negative bilaterally  Ligament stress tests performed through upper cervical spine for transverse ligament including Sharp-Cory test is negative, and Pandora-Axis shear test is negative. Pandora-Axis side flexion test for integrity of alar ligament is negative. Spurling test is negative. Cervical distraction is negative for change in symptoms, but \"feels good\". Passive Accessory Motion:         Moderate tightness through bilateral glenohumeral joints- no reproduction of symptoms. Significant tightness with cervical side glides with discomfort noted. Neurological Screen:              Myotomes: Key muscle strength testing through bilateral UE is [facility]. Dermatomes: Sensation to light touch for bilateral UE is intact from C4 to T2.    Reflexes: Biceps (C5): 1+ bilaterally         Brachioradialis (C6): 0 bilaterally        Triceps (C7): 0 bilaterally  Functional Mobility:         Patient with increased pain with cooking/ eating, performing housework- specifically mopping, some discomfort sleeping on sides at times, able to bowl without pain at this time, some pain with lifting.  7/20/2022: some pain when cooking if lifting something heavy or reaching high in the cabinet, able to sweep without pain but has not mopped, easier to get comfortable to sleep,  no pain with bowling. Balance:          Sitting and standing balance grossly intact. ASSESSMENT   Initial Assessment:  Ms. Rahel Padilla is a 80 y.o. female presenting to physical therapy with complaints of pain across bilateral shoulders which began around April. She reports pain with reaching forward and at times having pain in bilateral UEs, R worse than L. She also complains of R UE cramping that is brief but occurs daily. Patient reports thinking she strained a muscle working out a [employer] doing the shoulder press machine, but realizing the pain was still lingering. Patient reports feeling worse than when the pain started and having some neck stiffness as well. She is still working out, just with lighter weights and is able to bowl without problems. Patient is eager to reduce her pain and improve her overall mobility in order to return to her prior level of independence with ADLs and housework. Patient presents with increased pain, decreased strength, decreased ROM, decreased flexibility, impaired gait, impaired posture, impaired overhead reach, impaired transfer ability, decreased activity tolerance, and overall impaired functional mobility. Patient is a good candidate for skilled physical therapy interventions to include manual therapy, therapeutic exercise, postural re-education, body mechanics training, and pain modalities as needed. Progress Note 7/20/2022: Patient has been seen for 9 sessions of physical therapy from 6/21/2022 to 7/20/2022. She reports having less pain and feeling like she has better upright posture.  Patient continues to have some tightness through her shoulder/ neck musculature, but seems to be doing well overall. She has met some of her goals and is progressing overall. Patient would like to be on hold and see how she does on her own at home and plans to call us back if needing to get back on the schedule. Problem List: (Impacting functional limitations): Body Structures, Functions, Activity Limitations Requiring Skilled Therapeutic Intervention: Decreased functional mobility ; Decreased ROM; Decreased body mechanics; Decreased strength; Decreased endurance; Increased pain; Decreased posture     Therapy Prognosis:   Therapy Prognosis: Good     PLAN   Effective Dates: 6/20/2022 TO Plan of Care/Certification Expiration Date: 08/01/22 (start of plan of care 6/20/2022)     Frequency/Duration: Plan Frequency: 2 times a week for 6 weeks     Interventions Planned (Treatment may consist of any combination of the following):    Current Treatment Recommendations: Strengthening; ROM; Functional mobility training; ADL/Self-care training; Manual Therapy - Soft Tissue Mobilization; Manual Therapy - Joint Manipulation; Pain management; Home exercise program; Safety education & training; Modalities; Positioning; Integrated dry needling; Therapeutic activities     GOALS: (Goals have been discussed and agreed upon with patient.)  Short-Term Functional Goals: Time Frame: 7/20/2022  to 7/14/2022  Patient demonstrates independence with home exercise program without verbal cueing provided by therapist. -GOAL MET  Patient will report no more than 1/10 bilateral shoulder pain at rest in order to demonstrate improved self pain control and tolerance. -GOAL MET  Patient will be educated in and demonstrate improved upright posture including decreased forward head and scapular protraction to decrease strain on the cervical spine.  -GOAL MET  Patient will be educated in safe lifting techniques in order to decrease strain on cervical spine and shoulder pain with home activities. -GOAL MET  Discharge Goals: Time Frame: 7/20/2022  to 8/1/2022  Patient will improve bilateral cervical side bending to at least 45 degrees in order to demonstrate improved joint and tissue mobility. -GOAL MET  Patient will improve bilateral cervical rotations to at least 50 degrees in order to improve ability to look over shoulder while driving. -GOAL MET  Patient will be able to return to prior exercise level and continue bowling without pain in order to return to prior level of recreational activities. -ONGOING  Patient will be able to perform housework and light lifting at home without pain in order to return to prior level of activities around her home. -ONGOING  Patient will improve Disability of the Arm, Shoulder, and Hand score to 17/55 from 24/55. -ONGOING         Outcome Measure: Tool Used: Disabilities of the Arm, Shoulder and Hand (DASH) Questionnaire - Quick Version  Score:  Initial: 24/55  Most Recent: 21/55 (Date: 7/20/2022)   Interpretation of Score: The DASH is designed to measure the activities of daily living in person's with upper extremity dysfunction or pain. Each section is scored on a 1-5 scale, 5 representing the greatest disability. The scores of each section are added together for a total score of 55. Medical Necessity:   Patient is expected to demonstrate progress in strength, range of motion, coordination and functional technique to increase independence with ADLs and improve safety during reaching, lifting, and returning to prior level of activities. Skilled intervention continues to be required due to increased bilateral shoulder pain hindering functional mobility. Reason For Services/Other Comments:  Patient continues to require skilled intervention due to decreased activity tolerance and increased pain impacting quality of life and independence.   Total Duration:  Time In: 0802  Time Out: 6184    Regarding Constantin Donald's therapy, I certify that the treatment plan above will be carried out by a therapist or under their direction. Thank you for this referral,  Kyung Guzman, PT     Referring Physician Signature: Karla Naylor MD No Signature is Required for this note.         Post Session Pain  Charge Capture  PT Visit Info  POC Link  Treatment Note Link  MD Guidelines  MyChart

## 2022-07-27 ENCOUNTER — APPOINTMENT (OUTPATIENT)
Dept: PHYSICAL THERAPY | Age: 82
End: 2022-07-27
Payer: MEDICARE

## 2022-07-29 ENCOUNTER — APPOINTMENT (OUTPATIENT)
Dept: PHYSICAL THERAPY | Age: 82
End: 2022-07-29
Payer: MEDICARE

## 2022-08-03 ENCOUNTER — TELEPHONE (OUTPATIENT)
Dept: FAMILY MEDICINE CLINIC | Facility: CLINIC | Age: 82
End: 2022-08-03

## 2022-08-03 DIAGNOSIS — Z78.0 ASYMPTOMATIC MENOPAUSE: Primary | ICD-10-CM

## 2022-08-03 NOTE — TELEPHONE ENCOUNTER
Pt called stating that she is scheduled for a dental implant. States that the dentist told her that she has some soft bones. Wanted to know if she should have a bone density scan?

## 2022-08-10 ENCOUNTER — APPOINTMENT (RX ONLY)
Dept: URBAN - METROPOLITAN AREA CLINIC 24 | Facility: CLINIC | Age: 82
Setting detail: DERMATOLOGY
End: 2022-08-10

## 2022-08-10 DIAGNOSIS — L72.0 EPIDERMAL CYST: ICD-10-CM

## 2022-08-10 DIAGNOSIS — D18.0 HEMANGIOMA: ICD-10-CM

## 2022-08-10 DIAGNOSIS — D22 MELANOCYTIC NEVI: ICD-10-CM

## 2022-08-10 DIAGNOSIS — L73.8 OTHER SPECIFIED FOLLICULAR DISORDERS: ICD-10-CM

## 2022-08-10 DIAGNOSIS — Z71.89 OTHER SPECIFIED COUNSELING: ICD-10-CM

## 2022-08-10 DIAGNOSIS — L82.1 OTHER SEBORRHEIC KERATOSIS: ICD-10-CM

## 2022-08-10 DIAGNOSIS — T07XXXA ABRASION OR FRICTION BURN OF OTHER, MULTIPLE, AND UNSPECIFIED SITES, WITHOUT MENTION OF INFECTION: ICD-10-CM

## 2022-08-10 DIAGNOSIS — L57.8 OTHER SKIN CHANGES DUE TO CHRONIC EXPOSURE TO NONIONIZING RADIATION: ICD-10-CM

## 2022-08-10 DIAGNOSIS — Z87.2 PERSONAL HISTORY OF DISEASES OF THE SKIN AND SUBCUTANEOUS TISSUE: ICD-10-CM

## 2022-08-10 DIAGNOSIS — L72.8 OTHER FOLLICULAR CYSTS OF THE SKIN AND SUBCUTANEOUS TISSUE: ICD-10-CM

## 2022-08-10 PROBLEM — J30.1 ALLERGIC RHINITIS DUE TO POLLEN: Status: ACTIVE | Noted: 2022-08-10

## 2022-08-10 PROBLEM — S00.511A ABRASION OF LIP, INITIAL ENCOUNTER: Status: ACTIVE | Noted: 2022-08-10

## 2022-08-10 PROBLEM — D22.5 MELANOCYTIC NEVI OF TRUNK: Status: ACTIVE | Noted: 2022-08-10

## 2022-08-10 PROBLEM — I10 ESSENTIAL (PRIMARY) HYPERTENSION: Status: ACTIVE | Noted: 2022-08-10

## 2022-08-10 PROBLEM — D18.01 HEMANGIOMA OF SKIN AND SUBCUTANEOUS TISSUE: Status: ACTIVE | Noted: 2022-08-10

## 2022-08-10 PROBLEM — D22.72 MELANOCYTIC NEVI OF LEFT LOWER LIMB, INCLUDING HIP: Status: ACTIVE | Noted: 2022-08-10

## 2022-08-10 PROBLEM — E03.9 HYPOTHYROIDISM, UNSPECIFIED: Status: ACTIVE | Noted: 2022-08-10

## 2022-08-10 PROCEDURE — ? COUNSELING

## 2022-08-10 PROCEDURE — 99213 OFFICE O/P EST LOW 20 MIN: CPT

## 2022-08-10 PROCEDURE — ? OTHER

## 2022-08-10 ASSESSMENT — LOCATION DETAILED DESCRIPTION DERM
LOCATION DETAILED: NASAL DORSUM
LOCATION DETAILED: LEFT MID-UPPER BACK
LOCATION DETAILED: LEFT SUPERIOR LATERAL LOWER BACK
LOCATION DETAILED: RIGHT SUPERIOR FOREHEAD
LOCATION DETAILED: SUPERIOR THORACIC SPINE
LOCATION DETAILED: LEFT PROXIMAL PRETIBIAL REGION
LOCATION DETAILED: RIGHT POSTERIOR SHOULDER
LOCATION DETAILED: RIGHT RIB CAGE
LOCATION DETAILED: RIGHT SUPERIOR LATERAL MIDBACK
LOCATION DETAILED: LEFT INFERIOR CENTRAL MALAR CHEEK
LOCATION DETAILED: LEFT POSTERIOR SHOULDER
LOCATION DETAILED: LEFT LATERAL SUPERIOR CHEST
LOCATION DETAILED: RIGHT ANTERIOR DISTAL THIGH
LOCATION DETAILED: NASAL SUPRATIP
LOCATION DETAILED: LEFT INFERIOR UPPER BACK
LOCATION DETAILED: LEFT INFERIOR VERMILION LIP
LOCATION DETAILED: LEFT LATERAL ABDOMEN

## 2022-08-10 ASSESSMENT — LOCATION SIMPLE DESCRIPTION DERM
LOCATION SIMPLE: NOSE
LOCATION SIMPLE: LEFT SHOULDER
LOCATION SIMPLE: LEFT UPPER BACK
LOCATION SIMPLE: LEFT LIP
LOCATION SIMPLE: RIGHT THIGH
LOCATION SIMPLE: CHEST
LOCATION SIMPLE: ABDOMEN
LOCATION SIMPLE: LEFT LOWER BACK
LOCATION SIMPLE: LEFT CHEEK
LOCATION SIMPLE: UPPER BACK
LOCATION SIMPLE: RIGHT FOREHEAD
LOCATION SIMPLE: RIGHT SHOULDER
LOCATION SIMPLE: RIGHT LOWER BACK
LOCATION SIMPLE: LEFT PRETIBIAL REGION

## 2022-08-10 ASSESSMENT — LOCATION ZONE DERM
LOCATION ZONE: TRUNK
LOCATION ZONE: LEG
LOCATION ZONE: NOSE
LOCATION ZONE: ARM
LOCATION ZONE: LIP
LOCATION ZONE: FACE

## 2022-08-10 NOTE — PROCEDURE: OTHER
Detail Level: Simple
Note Text (......Xxx Chief Complaint.): This diagnosis correlates with the
Other (Free Text): Appears to be trauma. Had implants last week at dentist. Suggested to call dentist if it doesn’t resolve
Render Risk Assessment In Note?: no

## 2022-08-12 ENCOUNTER — HOSPITAL ENCOUNTER (OUTPATIENT)
Dept: MAMMOGRAPHY | Age: 82
Discharge: HOME | End: 2022-08-15
Payer: MEDICARE

## 2022-08-12 DIAGNOSIS — Z78.0 ASYMPTOMATIC MENOPAUSE: ICD-10-CM

## 2022-08-12 PROCEDURE — 77080 DXA BONE DENSITY AXIAL: CPT

## 2022-08-15 NOTE — RESULT ENCOUNTER NOTE
Let patient know she does have osteopenia which is thinning bones but not osteoporosis. Be sure she takes 600 mg of calcium daily.

## 2022-08-30 ENCOUNTER — HOSPITAL ENCOUNTER (OUTPATIENT)
Dept: LAB | Age: 82
Discharge: HOME | End: 2022-09-02

## 2022-08-30 PROCEDURE — 80053 COMPREHEN METABOLIC PANEL: CPT

## 2022-08-31 LAB
ALBUMIN SERPL-MCNC: 3.9 G/DL (ref 3.2–4.6)
ALBUMIN/GLOB SERPL: 1.4 {RATIO} (ref 1.2–3.5)
ALP SERPL-CCNC: 47 U/L (ref 50–136)
ALT SERPL-CCNC: 32 U/L (ref 12–65)
ANION GAP SERPL CALC-SCNC: 4 MMOL/L (ref 7–16)
AST SERPL-CCNC: 27 U/L (ref 15–37)
BILIRUB SERPL-MCNC: 0.5 MG/DL (ref 0.2–1.1)
BUN SERPL-MCNC: 23 MG/DL (ref 8–23)
CALCIUM SERPL-MCNC: 9.3 MG/DL (ref 8.3–10.4)
CHLORIDE SERPL-SCNC: 107 MMOL/L (ref 98–107)
CO2 SERPL-SCNC: 32 MMOL/L (ref 21–32)
CREAT SERPL-MCNC: 0.8 MG/DL (ref 0.6–1)
GLOBULIN SER CALC-MCNC: 2.8 G/DL (ref 2.3–3.5)
GLUCOSE SERPL-MCNC: 96 MG/DL (ref 65–100)
POTASSIUM SERPL-SCNC: 3.9 MMOL/L (ref 3.5–5.1)
PROT SERPL-MCNC: 6.7 G/DL (ref 6.3–8.2)
SODIUM SERPL-SCNC: 143 MMOL/L (ref 136–145)

## 2022-09-07 ENCOUNTER — NURSE TRIAGE (OUTPATIENT)
Dept: OTHER | Facility: CLINIC | Age: 82
End: 2022-09-07

## 2022-09-07 ENCOUNTER — OFFICE VISIT (OUTPATIENT)
Dept: FAMILY MEDICINE CLINIC | Facility: CLINIC | Age: 82
End: 2022-09-07
Payer: MEDICARE

## 2022-09-07 VITALS
RESPIRATION RATE: 16 BRPM | SYSTOLIC BLOOD PRESSURE: 126 MMHG | TEMPERATURE: 97.7 F | OXYGEN SATURATION: 93 % | HEART RATE: 74 BPM | WEIGHT: 147 LBS | DIASTOLIC BLOOD PRESSURE: 86 MMHG | BODY MASS INDEX: 27.75 KG/M2 | HEIGHT: 61 IN

## 2022-09-07 DIAGNOSIS — M25.512 CHRONIC LEFT SHOULDER PAIN: ICD-10-CM

## 2022-09-07 DIAGNOSIS — M54.2 NECK PAIN: ICD-10-CM

## 2022-09-07 DIAGNOSIS — G89.29 CHRONIC LEFT SHOULDER PAIN: ICD-10-CM

## 2022-09-07 DIAGNOSIS — R20.2 NUMBNESS AND TINGLING IN LEFT ARM: Primary | ICD-10-CM

## 2022-09-07 DIAGNOSIS — R20.0 NUMBNESS AND TINGLING IN LEFT ARM: Primary | ICD-10-CM

## 2022-09-07 PROCEDURE — 1090F PRES/ABSN URINE INCON ASSESS: CPT | Performed by: PHYSICIAN ASSISTANT

## 2022-09-07 PROCEDURE — G8427 DOCREV CUR MEDS BY ELIG CLIN: HCPCS | Performed by: PHYSICIAN ASSISTANT

## 2022-09-07 PROCEDURE — 99213 OFFICE O/P EST LOW 20 MIN: CPT | Performed by: PHYSICIAN ASSISTANT

## 2022-09-07 PROCEDURE — 1036F TOBACCO NON-USER: CPT | Performed by: PHYSICIAN ASSISTANT

## 2022-09-07 PROCEDURE — G8399 PT W/DXA RESULTS DOCUMENT: HCPCS | Performed by: PHYSICIAN ASSISTANT

## 2022-09-07 PROCEDURE — G8417 CALC BMI ABV UP PARAM F/U: HCPCS | Performed by: PHYSICIAN ASSISTANT

## 2022-09-07 PROCEDURE — 72040 X-RAY EXAM NECK SPINE 2-3 VW: CPT | Performed by: PHYSICIAN ASSISTANT

## 2022-09-07 PROCEDURE — 1123F ACP DISCUSS/DSCN MKR DOCD: CPT | Performed by: PHYSICIAN ASSISTANT

## 2022-09-07 RX ORDER — PREDNISONE 10 MG/1
TABLET ORAL
Qty: 28 TABLET | Refills: 0 | Status: SHIPPED | OUTPATIENT
Start: 2022-09-07 | End: 2022-09-26 | Stop reason: ALTCHOICE

## 2022-09-07 ASSESSMENT — PATIENT HEALTH QUESTIONNAIRE - PHQ9
SUM OF ALL RESPONSES TO PHQ QUESTIONS 1-9: 0
1. LITTLE INTEREST OR PLEASURE IN DOING THINGS: 0
SUM OF ALL RESPONSES TO PHQ QUESTIONS 1-9: 0
2. FEELING DOWN, DEPRESSED OR HOPELESS: 0
SUM OF ALL RESPONSES TO PHQ9 QUESTIONS 1 & 2: 0

## 2022-09-07 ASSESSMENT — ENCOUNTER SYMPTOMS: SHORTNESS OF BREATH: 0

## 2022-09-07 NOTE — PATIENT INSTRUCTIONS
*Take the steroid pack as prescribed for the next 10 days. *Recommend alternating ice (for 10 min) and heat (for 30 min), 3-4 times a day as able/needed. *You had an xray today. We have reviewed the images today. All of our x-rays, however, are sent out to be read by radiologist.  We will let you know if there is any change noted to the report.

## 2022-09-07 NOTE — TELEPHONE ENCOUNTER
Group Topic: BH Process Group     Date: 11/11/2019  Start Time:  1:00 PM  End Time:  2:05 PM    Focus: Individual Concerns   Number in attendance: 8      The patient talks about how she tends to spiral into negative self talk when she feels alone.  She mentioned that she knows that people care for her, but when she is overwhelmed and needs help and feels know one is around, she tends to fall into the negative self talk.  Other group members encouraged the patient to reach out for support, and the patient agreed that she doesn't do this often enough, instead tends to \"do to much on my own.\"      Method: Group  Attendance: Present  Participation: Active  Patient Response: Appropriate feedback, Attentive, Good eye contact and Interactive  Mood: Stressed  Affect: Calm and Relaxed  Behavior/Socialization: Cooperative, Engaged and Guarded  Thought Process: Focused  Task Performance: Follows directions and Needs cueing     Received call from Kalpesh Rojas at [facility] with [employer] Complaint. Subjective: Caller states \"I've had tingling in my left arm for about 3 weeks. \"     Current Symptoms: intermittent tingling in left arm(from shoulder to fingers. Pins and needles sensation. Has pain in left arm when using right arm. Denies chest pain and SOB    Onset: 3 weeks ago; worsening in that getting more frequent    Associated Symptoms: NA    Pain Severity: 2/10; sharp; fairly constant    Temperature: none     What has been tried: therapy, exercise at [address], Naproxen    LMP: NA Pregnant: NA    Recommended disposition: See in Office Today Now    Care advice provided, patient verbalizes understanding; denies any other questions or concerns; instructed to call back for any new or worsening symptoms. Patient/Caller agrees with recommended disposition; writer provided warm transfer to Jaspal Collier at [facility] for appointment scheduling     Attention Provider: Thank you for allowing me to participate in the care of your patient. The patient was connected to triage in response to information provided to the ECC/PSC. Please do not respond through this encounter as the response is not directed to a shared pool.         Reason for Disposition   Tingling (e.g., pins and needles) of the face, arm or leg on one side of the body, that is present now (Exceptions: Chronic or recurrent symptom lasting > 4 weeks; or tingling from known cause, such as: bumped elbow, carpal tunnel syndrome, pinched nerve, frostbite.)    Protocols used: Neurologic Deficit-ADULT-[place]

## 2022-09-07 NOTE — PROGRESS NOTES
[facility]  Phone [phone]      Patient: Alexys Kirk  YOB: 1940  Age 80 y.o. Sex female  Medical Record:  [id number]  Visit Date: 09/07/22  Author:  Renetta Juares PA-C    Family Practice Clinic Note    Chief Complaint   Patient presents with    Tingling     In left arm from shoulder to finger tips , started three weeks ago        History of Present Illness  This is a 81-year-old female who presents today with a chief concern regarding intermittent numbness and tingling of the left arm. She states that for the last 3 weeks she has noted the dysesthesias of the arm. Seems to extend from the shoulder all the way to her hand/fingertips. Exacerbated by certain positions of her arm. Not a constant issue but she states that is there at least 50% of the time. Denies any weakness of the hand or arm. She states that she has had some chronic left shoulder pain. This is been ongoing for at least the last 5 months. No preceding injury or trauma. She was sent to physical therapy but continues to have some pain in the shoulder area with use. She had an x-ray of the left shoulder in May of this year which was read as normal.  She notes that she does have some chronic neck pain as well. Past History:    Past Medical history   Past Medical History:   Diagnosis Date    Hypercholesteremia     Hypercholesterolemia     Hypertension     controlled with med    Other ill-defined conditions(869.)     high cholesterol    Thyroid disease        Current Problem List:   Patient Active Problem List   Diagnosis    Hypothyroid    Numbness and tingling of foot    Hyperlipidemia    Overweight    Neuropathic pain of ankle, right    Essential hypertension, benign       Current Medications: .   Current Outpatient Medications   Medication Sig Dispense Refill    lovastatin (MEVACOR) 20 MG tablet Take 1 tablet by mouth nightly 90 tablet 3    levothyroxine (SYNTHROID) 75 MCG tablet Take 1 tablet by mouth every morning (before breakfast) 90 tablet 3    hydroCHLOROthiazide (HYDRODIURIL) 25 MG tablet Take 0.5 tablets by mouth daily 90 tablet 3     No current facility-administered medications for this visit. Allergies:No Known Allergies    Surgical History:  Past Surgical History:   Procedure Laterality Date    BREAST BIOPSY Right     cyst surgically removed    BREAST SURGERY      cyst removed right breast - skin    CHOLECYSTECTOMY      5/10; had ERCP 8/10 due to retained stone/shpincterectomy by Dr. Mariia Victoria hyst - fibroids    LAP,CHOLECYSTECTOMY  May 2010       Family History:  Family History   Problem Relation Age of Onset    Heart Disease Mother     Stroke Mother     Hypertension Sister     Heart Attack Mother     Hypertension Mother     Heart Attack Brother     Diabetes Father     Heart Disease Father         MI    Hypertension Sister     Diabetes Sister     Diabetes Brother     Diabetes Mother        Social History:   Social History     Social History Narrative    Not on file      Social History     Socioeconomic History    Marital status:      Spouse name: Not on file    Number of children: Not on file    Years of education: Not on file    Highest education level: Not on file   Occupational History    Not on file   Tobacco Use    Smoking status: Former     Types: Cigarettes     Quit date: 1980     Years since quittin.3    Smokeless tobacco: Never   Substance and Sexual Activity    Alcohol use:  Yes     Alcohol/week: 0.0 standard drinks    Drug use: No    Sexual activity: Not on file   Other Topics Concern    Not on file   Social History Narrative    Not on file     Social Determinants of Health     Financial Resource Strain: Not on file   Food Insecurity: Not on file   Transportation Needs: Not on file   Physical Activity: Sufficiently Active    Days of Exercise per Week: 3 days    Minutes of Exercise per Session: 60 min Stress: Not on file   Social Connections: Not on file   Intimate Partner Violence: Not on file   Housing Stability: Not on file         ROS  Review of Systems   Constitutional:  Negative for chills and fever. Respiratory:  Negative for shortness of breath. Cardiovascular:  Negative for chest pain and palpitations. Musculoskeletal:  Positive for arthralgias and neck pain. Skin:  Negative for rash. Neurological:  Positive for numbness. Negative for weakness and headaches. /86 (Site: Right Upper Arm, Position: Sitting, Cuff Size: Medium Adult)   Pulse 74   Temp 97.7 °F (36.5 °C) (Temporal)   Resp 16   Ht 5' 1\" (1.549 m)   Wt 147 lb (66.7 kg)   SpO2 93%   BMI 27.78 kg/m²   Body mass index is 27.78 kg/m². Physical Exam    Physical Exam  Vitals and nursing note reviewed. Constitutional:       Appearance: Normal appearance. She is not ill-appearing. HENT:      Head: Normocephalic. Neck:      Comments: Patient notes discomfort with range of motion, specifically with rotation. Musculoskeletal:      Cervical back: Normal range of motion and neck supple. Comments: Left shoulder -skin is intact. There is no erythema, edema or increased warmth of the joint. Range of motion is full without any significant pain. Crossarm test negative. No AC joint tenderness to palpation. Speed, Amrcio Spindle and Neer's are negative. Spurling's negative. Normal strength as compared to right upper extremity. Distal neurovascular exam is intact. Lymphadenopathy:      Cervical: No cervical adenopathy. Neurological:      Mental Status: She is alert. Psychiatric:         Mood and Affect: Mood normal.         Behavior: Behavior normal.         Thought Content: Thought content normal.       ASSESSMENT & PLAN    ICD-10-CM    1. Numbness and tingling in left arm  R20.0 XR CERVICAL SPINE (2-3 VIEWS)    R20.2 predniSONE (DELTASONE) 10 MG tablet      2.  Neck pain  M54.2 XR CERVICAL SPINE (2-3 VIEWS)     predniSONE (DELTASONE) 10 MG tablet      3. Chronic left shoulder pain  M25.512     G89.29            1. Numbness and tingling in left arm  *X-ray of the cervical spine obtained today shows some mild degenerative change. No other acute abnormality appreciated. X-ray will be sent for formal interpretation and patient will be notified if there is any change to the report. *Trial of prednisone taper pack to be taken for the next 10 days, as prescribed. *Would consider further diagnostics such as MRI of the C-spine or nerve conduction study if warranted, should symptoms persist.  - XR CERVICAL SPINE (2-3 VIEWS)  - predniSONE (DELTASONE) 10 MG tablet; Take 4 tabs by mouth daily for 4 days, then 3 tabs daily for 2 days, then 2 tabs daily for 2 days, then 1 tab daily for 2 days  Dispense: 28 tablet; Refill: 0    2. Neck pain  *As above. - XR CERVICAL SPINE (2-3 VIEWS)  - predniSONE (DELTASONE) 10 MG tablet; Take 4 tabs by mouth daily for 4 days, then 3 tabs daily for 2 days, then 2 tabs daily for 2 days, then 1 tab daily for 2 days  Dispense: 28 tablet; Refill: 0    3. Chronic left shoulder pain  *Consider orthopedic referral for worsening or persistent symptoms. Orders Placed This Encounter   Procedures    XR CERVICAL SPINE (2-3 VIEWS)     I have reviewed the patient's past medical history, social history and family history and vitals. We have discussed treatment plan and follow up and given patient instructions. Patient's questions are answered and we will follow up as indicated. Dictated using voice recognition software. Proof read but unrecognized errors may exist.    Follow-up and Dispositions    Return in about 2 weeks (around 9/21/2022) for 2 week f/u neck pain, numbness and tingling of left arm.          Bianca Child PA-C

## 2022-09-26 ENCOUNTER — OFFICE VISIT (OUTPATIENT)
Dept: FAMILY MEDICINE CLINIC | Facility: CLINIC | Age: 82
End: 2022-09-26
Payer: MEDICARE

## 2022-09-26 VITALS
RESPIRATION RATE: 18 BRPM | HEIGHT: 61 IN | HEART RATE: 72 BPM | BODY MASS INDEX: 27.75 KG/M2 | OXYGEN SATURATION: 96 % | DIASTOLIC BLOOD PRESSURE: 63 MMHG | WEIGHT: 147 LBS | SYSTOLIC BLOOD PRESSURE: 135 MMHG | TEMPERATURE: 97.9 F

## 2022-09-26 DIAGNOSIS — M54.2 NECK PAIN: Primary | ICD-10-CM

## 2022-09-26 DIAGNOSIS — M25.512 CHRONIC LEFT SHOULDER PAIN: ICD-10-CM

## 2022-09-26 DIAGNOSIS — R20.0 NUMBNESS AND TINGLING IN LEFT ARM: ICD-10-CM

## 2022-09-26 DIAGNOSIS — R20.2 NUMBNESS AND TINGLING IN LEFT ARM: ICD-10-CM

## 2022-09-26 DIAGNOSIS — G89.29 CHRONIC LEFT SHOULDER PAIN: ICD-10-CM

## 2022-09-26 PROCEDURE — 99213 OFFICE O/P EST LOW 20 MIN: CPT | Performed by: PHYSICIAN ASSISTANT

## 2022-09-26 PROCEDURE — 1123F ACP DISCUSS/DSCN MKR DOCD: CPT | Performed by: PHYSICIAN ASSISTANT

## 2022-09-26 PROCEDURE — G8417 CALC BMI ABV UP PARAM F/U: HCPCS | Performed by: PHYSICIAN ASSISTANT

## 2022-09-26 PROCEDURE — 1090F PRES/ABSN URINE INCON ASSESS: CPT | Performed by: PHYSICIAN ASSISTANT

## 2022-09-26 PROCEDURE — 1036F TOBACCO NON-USER: CPT | Performed by: PHYSICIAN ASSISTANT

## 2022-09-26 PROCEDURE — G8427 DOCREV CUR MEDS BY ELIG CLIN: HCPCS | Performed by: PHYSICIAN ASSISTANT

## 2022-09-26 PROCEDURE — G8399 PT W/DXA RESULTS DOCUMENT: HCPCS | Performed by: PHYSICIAN ASSISTANT

## 2022-09-26 ASSESSMENT — ENCOUNTER SYMPTOMS: SHORTNESS OF BREATH: 0

## 2022-09-26 NOTE — PROGRESS NOTES
[facility] [address]  Phone [phone]      Patient: Leah Fonseca  YOB: 1940  Age 80 y.o. Sex female  Medical Record:  [id number]  Visit Date: 09/26/22  Author:  Senait Fuchs PA-C    Family Practice Clinic Note    Chief Complaint   Patient presents with    Follow-up     2 week follow up on Neck pain        History of Present Illness  This is a 79-year-old female who returns today for follow-up on neck pain and left arm pain with intermittent numbness and tingling. Her x-ray obtained last visit was read by radiology as showing minimal degenerative change. Otherwise unremarkable. She was given a prescription for a prednisone taper last visit. She reports compliance with medication and states that it did help significantly with the discomfort in her neck and shoulder area. Pain did not completely resolve but it is much better. She has however continued to have some numbness and tingling of the left arm which occurs intermittently. Not much of a change from previous. Continues to deny weakness or loss of range of motion of the arm.    ====================================================================  Hx 9/7/2022 (DF)  ASSESSMENT & PLAN     1. Numbness and tingling in left arm  *X-ray of the cervical spine obtained today shows some mild degenerative change. No other acute abnormality appreciated. X-ray will be sent for formal interpretation and patient will be notified if there is any change to the report. *Trial of prednisone taper pack to be taken for the next 10 days, as prescribed. *Would consider further diagnostics such as MRI of the C-spine or nerve conduction study if warranted, should symptoms persist.  - XR CERVICAL SPINE (2-3 VIEWS)  - predniSONE (DELTASONE) 10 MG tablet;  Take 4 tabs by mouth daily for 4 days, then 3 tabs daily for 2 days, then 2 tabs daily for 2 days, then 1 tab daily for 2 days  Dispense: 28 tablet; Refill: 0     2. Neck pain  *As above. - XR CERVICAL SPINE (2-3 VIEWS)  - predniSONE (DELTASONE) 10 MG tablet; Take 4 tabs by mouth daily for 4 days, then 3 tabs daily for 2 days, then 2 tabs daily for 2 days, then 1 tab daily for 2 days  Dispense: 28 tablet; Refill: 0     3. Chronic left shoulder pain  *Consider orthopedic referral for worsening or persistent symptoms. Follow-up and Dispositions    Return in about 2 weeks (around 9/21/2022) for 2 week f/u neck pain, numbness and tingling of left arm.  =================================================================================    Past History:    Past Medical history   Past Medical History:   Diagnosis Date    Hypercholesteremia     Hypercholesterolemia     Hypertension     controlled with med    Other ill-defined conditions(799.89)     high cholesterol    Thyroid disease        Current Problem List:   Patient Active Problem List   Diagnosis    Hypothyroid    Numbness and tingling of foot    Hyperlipidemia    Overweight    Neuropathic pain of ankle, right    Essential hypertension, benign       Current Medications: . Current Outpatient Medications   Medication Sig Dispense Refill    lovastatin (MEVACOR) 20 MG tablet Take 1 tablet by mouth nightly 90 tablet 3    levothyroxine (SYNTHROID) 75 MCG tablet Take 1 tablet by mouth every morning (before breakfast) 90 tablet 3    hydroCHLOROthiazide (HYDRODIURIL) 25 MG tablet Take 0.5 tablets by mouth daily 90 tablet 3     No current facility-administered medications for this visit.         Allergies:No Known Allergies    Surgical History:  Past Surgical History:   Procedure Laterality Date    BREAST BIOPSY Right 2014    cyst surgically removed    BREAST SURGERY      cyst removed right breast - skin    CHOLECYSTECTOMY      5/10; had ERCP 8/10 due to retained stone/shpincterectomy by Dr. Wyatt burgos - fibroids    LAP,CHOLECYSTECTOMY  May 2010       Family History:  Family History   Problem Relation Age of Onset    Heart Disease Mother     Stroke Mother     Hypertension Sister     Heart Attack Mother     Hypertension Mother     Heart Attack Brother     Diabetes Father     Heart Disease Father         MI    Hypertension Sister     Diabetes Sister     Diabetes Brother     Diabetes Mother        Social History:   Social History     Social History Narrative    Not on file      Social History     Socioeconomic History    Marital status:      Spouse name: Not on file    Number of children: Not on file    Years of education: Not on file    Highest education level: Not on file   Occupational History    Not on file   Tobacco Use    Smoking status: Former     Types: Cigarettes     Quit date: 1980     Years since quittin.4    Smokeless tobacco: Never   Vaping Use    Vaping Use: Never used   Substance and Sexual Activity    Alcohol use: Yes     Alcohol/week: 0.0 standard drinks    Drug use: No    Sexual activity: Not Currently   Other Topics Concern    Not on file   Social History Narrative    Not on file     Social Determinants of Health     Financial Resource Strain: Not on file   Food Insecurity: Not on file   Transportation Needs: Not on file   Physical Activity: Sufficiently Active    Days of Exercise per Week: 3 days    Minutes of Exercise per Session: 60 min   Stress: Not on file   Social Connections: Not on file   Intimate Partner Violence: Not on file   Housing Stability: Not on file         ROS  Review of Systems   Constitutional:  Negative for chills and fever. Respiratory:  Negative for shortness of breath. Cardiovascular:  Negative for chest pain and palpitations. Musculoskeletal:  Positive for arthralgias and neck pain. Skin:  Negative for rash. Neurological:  Positive for numbness. Negative for weakness and headaches.        /63   Pulse 72   Temp 97.9 °F (36.6 °C) (Temporal)   Resp 18   Ht 5' 1\" (1.549 m)   Wt 147 lb (66.7 kg)   SpO2 96%   BMI 27.78 kg/m²   Body mass index is 27.78 kg/m². Physical Exam    Physical Exam  Vitals and nursing note reviewed. Constitutional:       Appearance: Normal appearance. She is not ill-appearing. HENT:      Head: Normocephalic. Neck:      Comments: Patient notes discomfort with range of motion, specifically with lateral flexion to left. Musculoskeletal:      Cervical back: Normal range of motion and neck supple. Comments: Left shoulder - skin is intact. There is no erythema, edema or increased warmth of the joint. Range of motion remains full without any significant pain. Crossarm test negative. No AC joint tenderness to palpation. Speed, Marcelene [place] and Neer's are negative. Spurling's negative. Normal strength as compared to right upper extremity. Distal neurovascular exam is intact. Lymphadenopathy:      Cervical: No cervical adenopathy. Neurological:      Mental Status: She is alert. Psychiatric:         Mood and Affect: Mood normal.         Behavior: Behavior normal.         Thought Content: Thought content normal.       ASSESSMENT & PLAN    ICD-10-CM    1. Neck pain  M54.2 [address]      2. Numbness and tingling in left arm  R20.0 [address]    R20.2       3. Chronic left shoulder pain  M25.512 [address]    Y84.16            1. Neck pain  *Improved after prednisone taper but has not fully resolved. Given patient's continued discomfort with continued intermittent numbness and tingling of the left arm, will arrange referral to orthopedics for further evaluation. *May use over-the-counter anti-inflammatory such as ibuprofen or Aleve for use Tylenol as needed for discomfort. *Continue alternating ice and heat as needed. - [address], [facility]    2. Numbness and tingling in left arm  *As above.   - [address] [facility] [address] [facility]    3. Chronic left shoulder pain  *As above. *Continue range of motion exercises. - [address] [place] [address], [address] [facility], [place]      Orders Placed This Encounter   Procedures    [address] [facility], [address] [facility], [place]     Referral Priority:   Routine     Referral Type:   Eval and Treat     Referral Reason:   Specialty Services Required     Requested Specialty:   Orthopedic Surgery     Number of Visits Requested:   1     I have reviewed the patient's past medical history, social history and family history and vitals. We have discussed treatment plan and follow up and given patient instructions. Patient's questions are answered and we will follow up as indicated. Dictated using voice recognition software. Proof read but unrecognized errors may exist.    Follow-up and Dispositions    Return as previously scheduled.          Gwyn Weathers PA-C

## 2022-09-26 NOTE — PATIENT INSTRUCTIONS
*A referral has been placed to Orthopedics. They should contact you in the next 7-10 days to schedule. Please let us know if you do not hear from them. *Continue with the ice and heat. *May use over the counter Ibuprofen, Aleve or Tylenol as needed.

## 2022-09-28 ENCOUNTER — OFFICE VISIT (OUTPATIENT)
Dept: ORTHOPEDIC SURGERY | Age: 82
End: 2022-09-28
Payer: MEDICARE

## 2022-09-28 VITALS — WEIGHT: 150 LBS | HEIGHT: 61 IN | BODY MASS INDEX: 28.32 KG/M2

## 2022-09-28 DIAGNOSIS — M54.12 CERVICAL RADICULOPATHY: Primary | ICD-10-CM

## 2022-09-28 DIAGNOSIS — M54.2 NECK PAIN: ICD-10-CM

## 2022-09-28 PROCEDURE — 1036F TOBACCO NON-USER: CPT | Performed by: NURSE PRACTITIONER

## 2022-09-28 PROCEDURE — G8417 CALC BMI ABV UP PARAM F/U: HCPCS | Performed by: NURSE PRACTITIONER

## 2022-09-28 PROCEDURE — 1123F ACP DISCUSS/DSCN MKR DOCD: CPT | Performed by: NURSE PRACTITIONER

## 2022-09-28 PROCEDURE — G8428 CUR MEDS NOT DOCUMENT: HCPCS | Performed by: NURSE PRACTITIONER

## 2022-09-28 PROCEDURE — G8399 PT W/DXA RESULTS DOCUMENT: HCPCS | Performed by: NURSE PRACTITIONER

## 2022-09-28 PROCEDURE — 99203 OFFICE O/P NEW LOW 30 MIN: CPT | Performed by: NURSE PRACTITIONER

## 2022-09-28 PROCEDURE — 1090F PRES/ABSN URINE INCON ASSESS: CPT | Performed by: NURSE PRACTITIONER

## 2022-09-28 NOTE — PROGRESS NOTES
Name: Huy Keyes  YOB: 1940  Gender: female  MRN: [id number]    CC: Neck pain, left shoulder pain, arm numbness    History of present illness: This is a very pleasant 80 y.o. old female who presents with complaints of left neck pain only when she looked upright into cervical extension. However she did have some pain rating to the left shoulder. Slightly into the shoulder blade. Now she is having tingling down her arm and into all her fingers. Her primary doctor gave her steroids which was beneficial for the pain but patient is left with chronic numbness. Naproxen makes her dizzy. She is done physical therapy for 6 weeks. X-rays were essentially unremarkable. Very minimal spondylotic anterior osteophyte change. X-rays of the shoulder were negative. There have not been changes in fine motor skills such as handwriting and buttoning buttons. There have not been changes in gait since the onset of the symptoms. The patient has not had cervical surgery in the past.    Thus far, efforts to address the pain have included therapy and steroids         AMB PAIN ASSESSMENT 9/28/2022   Location of Pain Shoulder   Location Modifiers Left   Severity of Pain 5   Frequency of Pain Intermittent   Limiting Behavior Yes   Result of Injury No   Work-Related Injury No   Are there other pain locations you wish to document? No          ROS/Meds/PSH/PMH/FH/SH: I personally reviewed the patient's collected intake data.   Below are the pertinents:    No Known Allergies      Current Outpatient Medications:     lovastatin (MEVACOR) 20 MG tablet, Take 1 tablet by mouth nightly, Disp: 90 tablet, Rfl: 3    levothyroxine (SYNTHROID) 75 MCG tablet, Take 1 tablet by mouth every morning (before breakfast), Disp: 90 tablet, Rfl: 3    hydroCHLOROthiazide (HYDRODIURIL) 25 MG tablet, Take 0.5 tablets by mouth daily, Disp: 90 tablet, Rfl: 3  Past Surgical History:   Procedure Laterality Date    BREAST BIOPSY Right 2014 cyst surgically removed    BREAST SURGERY      cyst removed right breast - skin    CHOLECYSTECTOMY      5/10; had ERCP 8/10 due to retained stone/shpincterectomy by Dr. Cristin Dougherty hyst - fibroids    LAP,CHOLECYSTECTOMY  May 2010     Patient Active Problem List   Diagnosis    Hypothyroid    Numbness and tingling of foot    Hyperlipidemia    Overweight    Neuropathic pain of ankle, right    Essential hypertension, benign     Tobacco:  reports that she quit smoking about 42 years ago. Her smoking use included cigarettes. She has never used smokeless tobacco.  Alcohol:   Social History     Substance and Sexual Activity   Alcohol Use Yes    Alcohol/week: 0.0 standard drinks        Physical Exam:   BMI: Body mass index is 28.34 kg/m². GENERAL:  Adult in no acute distress, well developed, well nourished . Patient is appropriately conversant  CERVICAL SPINE:  Inspection of the neck reveals no evidence of rash or skin lesion. Examination of the cervical spine reveals no evidence of sagittal or coronal plane deformity, no palpable tenderness or step deformity, and pain with cervical extension  Spurling's sign is positive to the left side for reproduction of radicular symptoms. Patient ambulates with a normal gait. Sensory testing reveals intact sensation to light touch in the distribution of the C5-T1 dermatomes bilaterally  Reflexes     Right Left   Biceps (C5) 2 2   Brachio radialis (C6) 2 2    Triceps (C7) 2 2     Francisco's is negative     Inverted radial reflex is negative      Tinel's and Tressa testing over the cubital and carpal tunnels do not reproduce the symptoms. Shoulder examination is not consistent with adhesive capsulitis or acute rotator cuff tendinitis.     Strength testing in the upper extremity reveals the following based on the 5 point grading scale:     Delt(C5) Bicep(C6) WE(C6) Tricep (C7) WF(C7) (C8) Int (T1)   Right 5 5 5 5 5 5 5   Left 5 5 5 5 5 5 5     Pulses are palpable over bilateral radial arteries. Radiographic Studies:     AP and Lateral views of the Cervical Spine independently reviewed: Anterior osteophyte spurring. Assessment/Plan:       Diagnosis Orders   1. Cervical radiculopathy  MRI CERVICAL SPINE WO CONTRAST      2. Neck pain  MRI CERVICAL SPINE WO CONTRAST          This patient's clinical history and physical exam is consistent with cervical radiculopathy involving primarily the left C6 and C7 nerve root(s). I suspect patient may have a disc bulge. She does not have any weakness noted on exam.  She is left with paresthesia findings. X-rays were reassuring. We will going order an MRI of the cervical spine. I will give her some home exercises and I will see her back after. I discussed the natural history of this condition with the patient in that often these patients will experience diminishing of their symptoms within several weeks of conservative care. We also discussed that the typical conservative treatments available include rest, NSAIDs, pain medication, and physical therapy as symptoms allow. Oral and/or epidural steroids are other options to consider. I discussed potential surgical options including a discectomy and fusion if the symptoms fail to improve or there is a progressive neurologic deficit and conservative management has been exhausted. -MRI: A cervical MRI was ordered to confirm the diagnosis and assess the severity. No orders of the defined types were placed in this encounter. Orders Placed This Encounter   Procedures    MRI CERVICAL SPINE WO CONTRAST        3 This is an acute, uncomplicated illness/injury      No follow-ups on file. RAFAEL Huggins CNP  09/28/22      Elements of this note were created using speech recognition software. As such, errors of speech recognition may be present.

## 2022-10-19 ENCOUNTER — OFFICE VISIT (OUTPATIENT)
Dept: ORTHOPEDIC SURGERY | Age: 82
End: 2022-10-19
Payer: MEDICARE

## 2022-10-19 DIAGNOSIS — M48.02 FORAMINAL STENOSIS OF CERVICAL REGION: ICD-10-CM

## 2022-10-19 DIAGNOSIS — M54.12 CERVICAL RADICULOPATHY: Primary | ICD-10-CM

## 2022-10-19 PROCEDURE — G8428 CUR MEDS NOT DOCUMENT: HCPCS | Performed by: NURSE PRACTITIONER

## 2022-10-19 PROCEDURE — 1123F ACP DISCUSS/DSCN MKR DOCD: CPT | Performed by: NURSE PRACTITIONER

## 2022-10-19 PROCEDURE — G8399 PT W/DXA RESULTS DOCUMENT: HCPCS | Performed by: NURSE PRACTITIONER

## 2022-10-19 PROCEDURE — 99213 OFFICE O/P EST LOW 20 MIN: CPT | Performed by: NURSE PRACTITIONER

## 2022-10-19 PROCEDURE — 1036F TOBACCO NON-USER: CPT | Performed by: NURSE PRACTITIONER

## 2022-10-19 PROCEDURE — 1090F PRES/ABSN URINE INCON ASSESS: CPT | Performed by: NURSE PRACTITIONER

## 2022-10-19 PROCEDURE — G8417 CALC BMI ABV UP PARAM F/U: HCPCS | Performed by: NURSE PRACTITIONER

## 2022-10-19 PROCEDURE — G8484 FLU IMMUNIZE NO ADMIN: HCPCS | Performed by: NURSE PRACTITIONER

## 2022-10-19 NOTE — PROGRESS NOTES
Name: Mike Alejandro  YOB: 1940  Gender: female  MRN: [id number]    CC: Follow-up neck, left shoulder pain, arm numbness    HPI: This is a 80y.o. year old female who has had complaints of pain in the left shoulder blade and tingling down her arm into her fingers. She exhausted conservative treatment which was physical therapy for 6weeks and naproxen along with steroids. X-rays revealed mild spondylosis. X-rays of the shoulder were negative. Because of her radicular symptoms patient was referred for an MRI of the cervical spine. AMB PAIN ASSESSMENT 9/28/2022   Location of Pain Shoulder   Location Modifiers Left   Severity of Pain 5   Frequency of Pain Intermittent   Limiting Behavior Yes   Result of Injury No   Work-Related Injury No   Are there other pain locations you wish to document? No        No Known Allergies    Current Outpatient Medications:     lovastatin (MEVACOR) 20 MG tablet, Take 1 tablet by mouth nightly, Disp: 90 tablet, Rfl: 3    levothyroxine (SYNTHROID) 75 MCG tablet, Take 1 tablet by mouth every morning (before breakfast), Disp: 90 tablet, Rfl: 3    hydroCHLOROthiazide (HYDRODIURIL) 25 MG tablet, Take 0.5 tablets by mouth daily, Disp: 90 tablet, Rfl: 3  Past Medical History:   Diagnosis Date    Hypercholesteremia     Hypercholesterolemia     Hypertension     controlled with med    Other ill-defined conditions(799.89)     high cholesterol    Thyroid disease      Tobacco:  reports that she quit smoking about 42 years ago. Her smoking use included cigarettes. She has never used smokeless tobacco.  Alcohol:   Social History     Substance and Sexual Activity   Alcohol Use Yes    Alcohol/week: 0.0 standard drinks        Radiographic Studies:       MRI Results (most recent): FINDINGS:   VISUALIZED BRAIN: Within normal limits. SPINAL CORD: Normal in signal and morphology. MARROW: Vertebral body stature maintained. No findings of acute fracture.  No   diffuse marrow signal abnormality. No bone marrow edema like signal.   ALIGNMENT: Within normal limits. SOFT TISSUES: Preserved vascular flow voids. No focal thyroid nodule. No   cervical adenopathy. Visualized lung apices are clear. DISCS:Mild disc height loss at C4-C5 and moderate disc height loss at C5-C6. Diffuse disc desiccation. DEGENERATIVE:   C2-C3: No spinal canal narrowing. Right greater than left uncovertebral joint   and facet joint arthropathy contributes to mild right neural foraminal   narrowing. No significant left neural foraminal narrowing. C3-C4: No canal or foraminal narrowing. C4-C5: Posterior endplate hyperostosis with intervening disc bulge, with   superimposed left subarticular disc protrusion. No spinal canal narrowing. Bilateral uncovertebral joint and facet joint arthropathy. Severe left and mild   right neural foraminal narrowing. C5-C6: Posterior endplate hyperostosis with intervening disc bulge. Effacement   of the ventral thecal sac without significant spinal canal narrowing. Mild   bilateral uncovertebral joint and facet joint arthropathy without significant   neural foraminal narrowing. C6-C7: Posterior endplate hyperostosis with intervening disc bulge, eccentric to   the left. Effacement of the ventral thecal sac without significant spinal canal   narrowing. Mild bilateral uncovertebral joint and facet joint arthropathy. No   neural foraminal narrowing. C7-T1: No canal or foraminal narrowing. Assessment/Plan:        ICD-10-CM    1. Cervical radiculopathy  M54.12 Ambulatory referral to Physical Therapy     Amb External Referral To Physical Therapy     Amb External Referral To Pain Medicine      2. Foraminal stenosis of cervical region  M48.02 Ambulatory referral to Physical Therapy     Amb External Referral To Physical Therapy     Amb External Referral To Pain Medicine         Has severe left C45 foraminal stenosis most likely causing her symptoms.   My recommendation would be to trial 2 cervical epidural injections with aggressive traction therapy. If she does not improve then she is a candidate for possible ACDF surgery. She would like to pursue the injections prior to surgical intervention. -PT:  A course of physical therapy was prescribed in an attempt to reduce pain and inflammation, improve postural awareness, and increase cervical range of motion. Modalities such as dry needling, ultrasound, moist heat, TENS, and ice may be helpful for pain control. Soft tissue mobilization is often helpful for spasm. Other modalities such as cervical traction may be helpful in reducing radicular symptoms. I have recommended 2-3 times per week for the next 4-6 weeks     -Injections: The patient will be referred to a colleague who specializes in cervical epidural and or facet injections. We will reassess for potential surgical options if there is no long lasting benefit from the injections.  -Referral to pain management: The patient's care would be best managed in a formal pain management setting and will be referred accordingly.  -Future surgery: If the patient fails the conservative options listed above, I believe they may be a candidate for a C4-5 ACDF      No orders of the defined types were placed in this encounter. Orders Placed This Encounter   Procedures    Ambulatory referral to Physical Therapy    Amb External Referral To Physical Therapy    Amb External Referral To Pain Medicine        3 This is stable chronic illness/condition    Return for refer to pain management, return after 2 cervical epidurals and traction therapy. Selwyn Kocher, APRN - CNP  10/19/22      Elements of this note were created using speech recognition software. As such, errors of speech recognition may be present.

## 2022-10-19 NOTE — PATIENT INSTRUCTIONS
Anterior Cervical Discectomy and Fusion    How is this procedure done? This procedure is done through a cosmetic incision in the front of the neck and involves removing the cushion between the vertebrae along with any bone spurs that are pinching the spinal cord or nerves. The space where the disc was removed is then filled with a spacer cage and bone graft. This spacer will prevent the vertebrae from collapsing and rubbing together. A plate and screws is then used to secure the cage in place. How can this help me? This procedure is intended to relieve the pressure from your spinal cord and nerves which is typically the source of the pain and numbness you may feel in your neck or arms. What are the success rates? This procedure is typically 95% effective in alleviating your neck and arm symptoms within 3 months of your surgery. However, the longer your symptoms existed prior to the surgery, the longer it may take for them to resolve after the operation. In rare circumstances patients with severe weakness and numbness may not have much improvement of their symptoms. In this case, the surgery is done to prevent further deterioration. What are the risks? The most common risk is failure of the bone graft to heal (<5%). Other less common risks include infection, blood loss, nerve injury, spinal cord injury, throat or wind pipe injury, reaction to anesthesia, leakage of spinal fluid, failure of plate/screws, failure of surgery to relieve your symptoms, persistent pain in the neck, blood clots, and very rarely, death. Also about 10-20% of patients will develop disc degeneration over the following 10 years at levels above or below the operation which may require extension of the fusion. How long will I be hospitalized? Most patients having a 1 or 2 level ACDF are sent home the same day.  However, if you have a drain placed for bleeding, are having difficulty breathing or unstable blood pressure, you may need to stay overnight. What do I expect after the operation? You will have a sore throat, some hoarseness, and swallowing discomfort for the first week. What restrictions will I have? For the first 2-4 weeks you should:  Avoid driving  Limit car rides     Avoid lifting more than 10 lbs  Avoid reaching over your head  Avoid submersing the wounds in water    Will I need therapy? You should walk as much as you can for exercise. Usually formal physical therapy or rehabilitation is not needed after this procedure. Remember to navigate stairs carefully. How do I care for my wound? Three days after the operation you may remove the outer bandages from the neck wound. The wound is sealed with dermabond glue. If the wound is dry, it is ok to take a shower. If the wound is still draining, cover it with clean dressings until there is no further drainage. Do I need a neck brace? Many neck surgeries will not require a neck brace. Often, the internal plate and screws are sufficient for stabilization. Other times, your surgeon may prescribe a neck brace for you to supplement the internal fixation or simply have you wear one for comfort.      [facility]    MD Fifi Shepherd MD Amy Karleen Fearing, PA-C Fayrene Brisk, NP    Main Office  [address] [facility], [address] [address] [address] [place] [address]       For Appointments at either location, please call [phone]

## 2022-10-26 ENCOUNTER — CLINICAL DOCUMENTATION (OUTPATIENT)
Dept: ORTHOPEDIC SURGERY | Age: 82
End: 2022-10-26

## 2022-11-09 ENCOUNTER — OFFICE VISIT (OUTPATIENT)
Dept: ORTHOPEDIC SURGERY | Age: 82
End: 2022-11-09
Payer: MEDICARE

## 2022-11-09 DIAGNOSIS — M54.12 CERVICAL RADICULOPATHY: Primary | ICD-10-CM

## 2022-11-09 DIAGNOSIS — Z74.09 IMPAIRED MOBILITY AND ADLS: ICD-10-CM

## 2022-11-09 DIAGNOSIS — M53.82 IMPAIRED RANGE OF MOTION OF CERVICAL SPINE: ICD-10-CM

## 2022-11-09 DIAGNOSIS — M54.2 NECK PAIN: ICD-10-CM

## 2022-11-09 DIAGNOSIS — Z78.9 IMPAIRED MOTOR CONTROL: ICD-10-CM

## 2022-11-09 DIAGNOSIS — Z78.9 IMPAIRED MOBILITY AND ADLS: ICD-10-CM

## 2022-11-09 PROCEDURE — 97110 THERAPEUTIC EXERCISES: CPT

## 2022-11-09 PROCEDURE — 97162 PT EVAL MOD COMPLEX 30 MIN: CPT

## 2022-11-09 PROCEDURE — 97140 MANUAL THERAPY 1/> REGIONS: CPT

## 2022-11-09 NOTE — PROGRESS NOTES
111 [facility]  [address] [facility] [address]  Dept: [phone]      Physical Therapy Initial Assessment     Referring MD: RAFAEL Llanos*  Diagnosis:     ICD-10-CM    1. Cervical radiculopathy  M54.12       2. Impaired mobility and ADLs  Z74.09     Z78.9       3. Impaired range of motion of cervical spine  M53.82       4. Neck pain  M54.2       5. Impaired motor control  Z78.9          Surgery: n/a    Therapy precautions:None  Co-morbidities affecting plan of care: hypothyroid, HTN  Total Timed Procedure Codes: 25 min, Total Time: 50 min    PERTINENT MEDICAL HISTORY     Past medical and surgical history:   Past Medical History:   Diagnosis Date    Hypercholesteremia     Hypercholesterolemia     Hypertension     controlled with med    Other ill-defined conditions(799.89)     high cholesterol    Thyroid disease       Past Surgical History:   Procedure Laterality Date    BREAST BIOPSY Right 2014    cyst surgically removed    BREAST SURGERY      cyst removed right breast - skin    CHOLECYSTECTOMY      5/10; had ERCP 8/10 due to retained stone/shpincterectomy by Dr. Palacio Bras hyst - fibroids    LAP,CHOLECYSTECTOMY  May 2010     Medications: reviewed in chart   Allergies: No Known Allergies     Diagnostic exams (per chart review): MRI Results (most recent): FINDINGS:   VISUALIZED BRAIN: Within normal limits. SPINAL CORD: Normal in signal and morphology. MARROW: Vertebral body stature maintained. No findings of acute fracture. No   diffuse marrow signal abnormality. No bone marrow edema like signal.   ALIGNMENT: Within normal limits. SOFT TISSUES: Preserved vascular flow voids. No focal thyroid nodule. No   cervical adenopathy. Visualized lung apices are clear. DISCS:Mild disc height loss at C4-C5 and moderate disc height loss at C5-C6. Diffuse disc desiccation. DEGENERATIVE:   C2-C3: No spinal canal narrowing.  Right greater than left uncovertebral joint   and facet joint arthropathy contributes to mild right neural foraminal   narrowing. No significant left neural foraminal narrowing. C3-C4: No canal or foraminal narrowing. C4-C5: Posterior endplate hyperostosis with intervening disc bulge, with   superimposed left subarticular disc protrusion. No spinal canal narrowing. Bilateral uncovertebral joint and facet joint arthropathy. Severe left and mild   right neural foraminal narrowing. C5-C6: Posterior endplate hyperostosis with intervening disc bulge. Effacement   of the ventral thecal sac without significant spinal canal narrowing. Mild   bilateral uncovertebral joint and facet joint arthropathy without significant   neural foraminal narrowing. C6-C7: Posterior endplate hyperostosis with intervening disc bulge, eccentric to   the left. Effacement of the ventral thecal sac without significant spinal canal   narrowing. Mild bilateral uncovertebral joint and facet joint arthropathy. No   neural foraminal narrowing. C7-T1: No canal or foraminal narrowing. SUBJECTIVE     Chief complaints/history of injury: Patient reports onset of pain in the spring across both shoulders; she notes that movement of the right should will cause sharp pains across her neck into the left shoulder, and she experiences episodic numbness into all digits of the left hand  Date symptoms began: 04/22  Bettina Castelan of condition: Chronic (continuous duration > 3 months)  Primary cause of current episode: Unspecified  How did symptoms start: unknown onset, no specific HARVEY  Describe current symptoms: pain in L side CTJ with radiation to distal LUE    Received previous therapy? Yes; Number of therapy visits in the last 90 days: 0    Pain Assessment:  Pain location: L side CTJ to UT    Current (0-10 pain scale): 2/10  Average Pain/symptom intensity (0-10 scale)  Last 24 hours: 6/10  Last week (1-7 days): 7/10  How often do you feel symptoms?  Constant (% of time)  Description: aching, dull, and sharp with contralateral motions  Aggravating factors:   turning head Left, looking up, reaching, and lifting/carrying  driving  Alleviating factors:   rest  lying down  avoid aggravating movements    Social/Functional Hx: How would you rate your overall health? very good  Pt lives with independent spouse in a(n) 1 story house with entry steps. Current DME: none  Work Status: Employed full time: IT   Sleep: normal  PLOF & Social Hx/Interests: Independent and active without physical limitations and participated in gardening, bowling, and exercise  How much have your symptoms interfered with daily activities? Extremely  Current level of function:  restricted to avoid aggravating motions    Patient Stated Goals: reduce pain, return to PLOF, avoid surgery      OBJECTIVE     Functional Outcome Questionnaire: Neck Disability Index: 17/50= 34% functional deficit   Hand/Side Dominance: right handed  Observation:   Posture: Forward head, Rounded shoulders, and Thoracic kyphosis  Swelling/Edema: none  Skin Integrity: normal     Neuro screen: numbness to to all fingers    Cranial Nerves: WNL    Vertebral artery:  WNL    Dermatome: WNL  Myotome: grossly 4+/5    Reflexes   DTR   Level Left Right   C5 1+ 1+   C6 1+ 1+   C7 1+ 1+    UMN   Francisco's - -   Supraclavicular - -          AROM  Cervical Restriction Overpressure   Flexion no    Extension Max*    R side bend Mod    L side bend Max*    R rotation Mod    L Rotation Max*            Shoulder WNL      Stability  Cervical    Sharp's Cory -   Alar Ligament -   Transverse Ligament -     Radiculopathy Cluster  Cervical +/- Description   Rotation + <60 degrees   ULTTa + (L)    Distraction + (L)    Spurling's + (L)      Thoracic Outlet Syndrome: Supraclavicular pressure: -  Cyriax release: -    Palpation/joint mobility: TTP and hypomobile CTJ and lower cervical, hypertonic UT (B, mod)    Treatment provided today consisted of initial evaluation followed by:  Manual therapy (20803) x 10 min utilizing techniques to improve joint and/or soft tissue mobility, ROM, and function as well as helping to decrease pain/spasms and swelling. Palpation and assessment of soft tissue, muscles, and landmarks   Cervical mobilization  Traction, grade 1-3    Therapeutic exercise (73627) x 15 min to develop HEP for improved ROM, strength, endurance and flexibility of the upper quarter. Patient Education on the condition/pathology, involved anatomy, and exercise rationale. Patient also instructed on the performance of a HEP as noted below. CLINICAL DECISION MAKING/ASSESSMENT     Personal Factors/co-morbidities affecting POC (1-2 Medium/3+High): age  past/current experiences  co-morbidities as previously noted   Problem List: (1-2 Low/ 3 Medium/ 4+ High) Pain  ROM limitations  Muscle spasm  Motor control deficits  Impaired posture  ADL/functional limitations/modifications  Restricted recreational participation    Clinical decision making: moderate complexity with questionable prediction of expectations and future outcomes which may require adjustments to the POC. Prognosis: good   Benefits and precautions of treatment explained to patient. Lance Ramos is a 80 y.o. female who presents to therapy today with evolving/changing clinical presentation (moderate complexity)  related to neck and shoulder pain. Presentation is consistent with Neck Pain with Mobility Deficits and Radiating Pain per the Neck Pain Treatment Based Classification. Pt would benefit from skilled physical therapy services to address the deficits noted above for return to prior level of function. PLAN OF CARE     Effective Dates: 11/9/2022 TO 2/7/2023 (90 days).     Frequency/Duration: 2x/week for 60 Day(s)  Interventions may include but are not limited to: (45742) Therapeutic exercise to develop ROM, strength, endurance and flexibility  (15469) Therapeutic activities using dynamic activities to improve function  (86387) Manual therapy techniques to improve joint and/or soft tissue mobility, ROM, and function as well as helping to decrease pain/spasms and swelling  (58423) Mechanical traction to address spinal/nerve/disc compression, improve joint mobility, and/or soft tissue flexibility  Home exercise program (HEP) development    The referring physician has reviewed and approved this evaluation and plan of care as noted by the electronic signature attached to note. GOALS     Short term goals to be met by 12/7/2022  (4 weeks):  Pt will show good recall of HEP requiring no more than minimal verbal cuing for proper form and technique. Pt will increase cervical AROM to mod restrictions without pain, in order to improve safety in ADLs ad driving. Pt will report pain decreased to intermittent for improved ability to perform ADLs and exercise interventions. Pt will demonstrate decreased hypertonicity in the upper trap to no more than mild intensity. Pt will report symptoms have centralized to no farther than L CTJ indicating nerve healing and improving pain levels and muscle activation. Long term goals to be met by 1/4/2023  (8 weeks):  Pt will achieve cervical AROM to [facility] to improve safety when driving, comfort while reading, and sleep. Pt will improve NDI to </= 10 % disability, demonstrating improved overall function. Pt will test negatively for cervical radiculopathy TIC, indicating appropriate mobility and healing of nerve tissues. Pt will return to previous level of function without complaint of neck pain. Ensyn  Access Code: [email or web address]  URL: [email or web address]  Date: 11/09/2022  Prepared by: Magy Mcbride    Exercises  Cervical Extension AROM with Strap - 2 x daily - 2 sets - 10 reps - 5 hold  Seated Assisted Cervical Rotation with Towel - 2 x daily - 2 sets - 10 reps - 2 hold  Reverse Push Ups - 2 x daily - 2 sets - 15 reps - 2 hold

## 2022-11-11 ENCOUNTER — OFFICE VISIT (OUTPATIENT)
Dept: ORTHOPEDIC SURGERY | Age: 82
End: 2022-11-11
Payer: MEDICARE

## 2022-11-11 DIAGNOSIS — Z78.9 IMPAIRED MOBILITY AND ADLS: ICD-10-CM

## 2022-11-11 DIAGNOSIS — Z74.09 IMPAIRED MOBILITY AND ADLS: ICD-10-CM

## 2022-11-11 DIAGNOSIS — M54.12 CERVICAL RADICULOPATHY: ICD-10-CM

## 2022-11-11 DIAGNOSIS — M53.82 IMPAIRED RANGE OF MOTION OF CERVICAL SPINE: ICD-10-CM

## 2022-11-11 DIAGNOSIS — M54.2 NECK PAIN: Primary | ICD-10-CM

## 2022-11-11 DIAGNOSIS — Z78.9 IMPAIRED MOTOR CONTROL: ICD-10-CM

## 2022-11-11 PROCEDURE — 97110 THERAPEUTIC EXERCISES: CPT

## 2022-11-11 PROCEDURE — 97140 MANUAL THERAPY 1/> REGIONS: CPT

## 2022-11-11 NOTE — PROGRESS NOTES
111 [facility]  [address] [facility],[address] [address]  Dept: [phone]      Physical Therapy Daily Note     Referring MD: RAFAEL Santiago*  Diagnosis:     ICD-10-CM    1. Neck pain  M54.2       2. Cervical radiculopathy  M54.12       3. Impaired mobility and ADLs  Z74.09     Z78.9       4. Impaired range of motion of cervical spine  M53.82       5. Impaired motor control  Z78.9            Surgery: n/a    Therapy precautions:None  Co-morbidities affecting plan of care: hypothyroid, HTN  Total Timed Procedure Codes: 25 min, Total Time: 50 min    PERTINENT MEDICAL HISTORY     Past medical and surgical history:   Past Medical History:   Diagnosis Date    Hypercholesteremia     Hypercholesterolemia     Hypertension     controlled with med    Other ill-defined conditions(799.89)     high cholesterol    Thyroid disease       Past Surgical History:   Procedure Laterality Date    BREAST BIOPSY Right 2014    cyst surgically removed    BREAST SURGERY      cyst removed right breast - skin    CHOLECYSTECTOMY      5/10; had ERCP 8/10 due to retained stone/shpincterectomy by Dr. Christina Lancaster hyst - fibroids    LAP,CHOLECYSTECTOMY  May 2010     Medications: reviewed in chart   Allergies: No Known Allergies     SUBJECTIVE     Chief complaints/history of injury: Patient reports onset of pain in the spring across both shoulders; she notes that movement of the right should will cause sharp pains across her neck into the left shoulder, and she experiences episodic numbness into all digits of the left hand  Date symptoms began: 04/22  Kyle Tejada of condition: Chronic (continuous duration > 3 months)  Primary cause of current episode: Unspecified  How did symptoms start: unknown onset, no specific HARVEY  Describe current symptoms: pain in L side CTJ with radiation to distal LUE    Received previous therapy?  Yes; Number of therapy visits in the last 90 days: 0    Pain Assessment:  Pain location: L side CTJ to UT    Current (0-10 pain scale): 2/10  Average Pain/symptom intensity (0-10 scale)  Last 24 hours: 6/10  Last week (1-7 days): 7/10  How often do you feel symptoms? Constant (% of time)  Description: aching, dull, and sharp with contralateral motions  Aggravating factors:   turning head Left, looking up, reaching, and lifting/carrying  driving  Alleviating factors:   rest  lying down  avoid aggravating movements    Patient Stated Goals: reduce pain, return to PLOF, avoid surgery    Initial Evaluation: 11/9/22  Last Progress Note: n/a  Total Visits: 2    Total Timed Codes: 40 min, Total Treatment Time: 40 min    SUBJECTIVE     Pt reports feeling ok on presentation; there have not been significant changes in her condition, but she has been performing her HEP. She thinks she may be moving her neck a little better, but her should continues to have pain. OBJECTIVE     AROM  Cervical Restriction Overpressure   Flexion no    Extension Max*    R side bend Mod    L side bend Max*    R rotation Mod    L Rotation Mod*            Shoulder WNL      Palpation/joint mobility: TTP and hypomobile CTJ and lower cervical, hypertonic UT (B, mod)    Treatment provided today:  Manual therapy (31013) x 25 min utilizing techniques to improve joint and/or soft tissue mobility, ROM, and function as well as helping to decrease pain/spasms and swelling. Palpation and assessment of soft tissue, muscles, and landmarks   Cervical mobilization  Traction, grade 1-3  STM  Cervical paraspinals (B)  UT (B)  Nerve glides  ulnar    Therapeutic exercise (12906) x 15 min to develop ROM, strength, endurance and flexibility of the upper quarter.   Repeated motions  Nerve glide  Ulnar  Reverse pushup  Row  Mid  Light band (head) 1x fatigue  Shoulder extension  Light band (head) 1x fatigue    Patient Education on rationale for activities and planned progression    ASSESSMENT     Patient demonstrates:  Impairments in cervical AROM with associated mobility deficits in the CTJ  Hypertonic UT     She has improvements in:  Cervical AROM and paresthesia s/p manual treatment  Reported symptoms, noting her shoulders feel much better following engagement activities     Continues with:  Presentation consistent with Neck Pain with Mobility Deficits and Radiating Pain per the Neck Pain Treatment Based Classification. PLAN     Continue therapy according to POC. Manual treatment for improved mobility and tissue extensibility to restore cervical AROM. Targeted engagement of scapular stabilizers to reduce UT compensation, with integration into functional tasks as tolerated. PLAN OF CARE     Effective Dates: 11/9/2022 TO 2/7/2023 (90 days). Frequency/Duration: 2x/week for 60 Day(s)  Interventions may include but are not limited to: (74848) Therapeutic exercise to develop ROM, strength, endurance and flexibility  (59323) Therapeutic activities using dynamic activities to improve function  (45307) Manual therapy techniques to improve joint and/or soft tissue mobility, ROM, and function as well as helping to decrease pain/spasms and swelling  (11193) Mechanical traction to address spinal/nerve/disc compression, improve joint mobility, and/or soft tissue flexibility  Home exercise program (HEP) development    GOALS     Short term goals to be met by 12/7/2022  (4 weeks):  Pt will show good recall of HEP requiring no more than minimal verbal cuing for proper form and technique. Pt will increase cervical AROM to mod restrictions without pain, in order to improve safety in ADLs ad driving. Pt will report pain decreased to intermittent for improved ability to perform ADLs and exercise interventions. Pt will demonstrate decreased hypertonicity in the upper trap to no more than mild intensity. Pt will report symptoms have centralized to no farther than L CTJ indicating nerve healing and improving pain levels and muscle activation.     Long term goals to be met by 1/4/2023  (8 weeks):  Pt will achieve cervical AROM to [facility] to improve safety when driving, comfort while reading, and sleep. Pt will improve NDI to </= 10 % disability, demonstrating improved overall function. Pt will test negatively for cervical radiculopathy TIC, indicating appropriate mobility and healing of nerve tissues. Pt will return to previous level of function without complaint of neck pain. [employer]ostay  Access Code: [email or web address]  URL: [email or web address]  Date: 11/09/2022  Prepared by: Layla Gusman    Exercises  Cervical Extension AROM with Strap - 2 x daily - 2 sets - 10 reps - 5 hold  Seated Assisted Cervical Rotation with Towel - 2 x daily - 2 sets - 10 reps - 2 hold  Reverse Push Ups - 2 x daily - 2 sets - 15 reps - 2 hold

## 2022-11-14 ENCOUNTER — OFFICE VISIT (OUTPATIENT)
Dept: ORTHOPEDIC SURGERY | Age: 82
End: 2022-11-14
Payer: MEDICARE

## 2022-11-14 DIAGNOSIS — M54.2 NECK PAIN: Primary | ICD-10-CM

## 2022-11-14 DIAGNOSIS — M53.82 IMPAIRED RANGE OF MOTION OF CERVICAL SPINE: ICD-10-CM

## 2022-11-14 DIAGNOSIS — Z74.09 IMPAIRED MOBILITY AND ADLS: ICD-10-CM

## 2022-11-14 DIAGNOSIS — Z78.9 IMPAIRED MOBILITY AND ADLS: ICD-10-CM

## 2022-11-14 DIAGNOSIS — Z78.9 IMPAIRED MOTOR CONTROL: ICD-10-CM

## 2022-11-14 DIAGNOSIS — M54.12 CERVICAL RADICULOPATHY: ICD-10-CM

## 2022-11-14 PROCEDURE — 97140 MANUAL THERAPY 1/> REGIONS: CPT

## 2022-11-14 PROCEDURE — 97110 THERAPEUTIC EXERCISES: CPT

## 2022-11-14 NOTE — PROGRESS NOTES
111 [facility]  [address] [facility],[address] [address]  Dept: [phone]      Physical Therapy Daily Note     Referring MD: RAFAEL Bradley*  Diagnosis:     ICD-10-CM    1. Neck pain  M54.2       2. Cervical radiculopathy  M54.12       3. Impaired mobility and ADLs  Z74.09     Z78.9       4. Impaired range of motion of cervical spine  M53.82       5. Impaired motor control  Z78.9            Surgery: n/a    Therapy precautions:None  Co-morbidities affecting plan of care: hypothyroid, HTN  Total Timed Procedure Codes: 25 min, Total Time: 50 min    PERTINENT MEDICAL HISTORY     Past medical and surgical history:   Past Medical History:   Diagnosis Date    Hypercholesteremia     Hypercholesterolemia     Hypertension     controlled with med    Other ill-defined conditions(799.89)     high cholesterol    Thyroid disease       Past Surgical History:   Procedure Laterality Date    BREAST BIOPSY Right 2014    cyst surgically removed    BREAST SURGERY      cyst removed right breast - skin    CHOLECYSTECTOMY      5/10; had ERCP 8/10 due to retained stone/shpincterectomy by Dr. Flora Louise hyst - fibroids    LAP,CHOLECYSTECTOMY  May 2010     Medications: reviewed in chart   Allergies: No Known Allergies     SUBJECTIVE     Chief complaints/history of injury: Patient reports onset of pain in the spring across both shoulders; she notes that movement of the right should will cause sharp pains across her neck into the left shoulder, and she experiences episodic numbness into all digits of the left hand  Date symptoms began: 04/22  Abhay Begin of condition: Chronic (continuous duration > 3 months)  Primary cause of current episode: Unspecified  How did symptoms start: unknown onset, no specific HARVEY  Describe current symptoms: pain in L side CTJ with radiation to distal LUE    Received previous therapy?  Yes; Number of therapy visits in the last 90 days: 0    Pain Assessment:  Pain location: L side CTJ to UT    Current (0-10 pain scale): 2/10  Average Pain/symptom intensity (0-10 scale)  Last 24 hours: 6/10  Last week (1-7 days): 7/10  How often do you feel symptoms? Constant (% of time)  Description: aching, dull, and sharp with contralateral motions  Aggravating factors:   turning head Left, looking up, reaching, and lifting/carrying  driving  Alleviating factors:   rest  lying down  avoid aggravating movements    Patient Stated Goals: reduce pain, return to PLOF, avoid surgery    Initial Evaluation: 11/9/22  Last Progress Note: n/a  Total Visits: 3    Total Timed Codes: 40 min, Total Treatment Time: 40 min    SUBJECTIVE     Pt reports feeling good on presentation; she notices she has been able to move her neck into extension more easily, allowing her to place her eye drops without laying supine. Her shoulders are feeling better, with decreased incidence of paresthesia, but she also thinks she may not be performing the reverse push ups appropriately. OBJECTIVE     AROM  Cervical Restriction Overpressure   Flexion no    Extension Mod    R side bend Mod    L side bend Max    R rotation Min    L Rotation Mod            Shoulder WNL      Palpation/joint mobility: hypomobile C3-5 (lateral (B)) CTJ, hypertonic UT (B, mild)    Treatment provided today:  Manual therapy (64990) x 15 min utilizing techniques to improve joint and/or soft tissue mobility, ROM, and function as well as helping to decrease pain/spasms and swelling. Palpation and assessment of soft tissue, muscles, and landmarks   Cervical mobilization  Lateral glides (C3-5) grade 1-3  Traction, grade 1-3    Therapeutic exercise (28072) x 25 min to develop ROM, strength, endurance and flexibility of the upper quarter.   Scapular retraction  Reverse pushup  Foam roll  Row  Mid  Light band (head) 2x60s  Horizontal abduction  V light band (head) 1x fatigue  Shoulder extension  Light band (head) 2x60s  Wall clock  Horizontal reach (B) 1x2min    Patient Education on rationale for activities, positive indicators of progression, discussion of shoulder involvement and plan for assessment PRN    ASSESSMENT     Patient demonstrates:  Impairments in cervical AROM with associated mobility deficits in the mid cervical through CTJ  Mild hypertonic UT  Reduced symptoms on presentation, with no pain in AROM     She has improvements in:  Cervical AROM s/p manual treatment  Shoulder pain and UE paresthesia     Continues with:  Presentation consistent with Neck Pain with Mobility Deficits and Radiating Pain per the Neck Pain Treatment Based Classification. PLAN     Continue therapy according to POC. Manual treatment for improved mobility and tissue extensibility to restore cervical AROM. Targeted engagement of scapular stabilizers to reduce UT compensation, with integration into functional tasks as tolerated. PLAN OF CARE     Effective Dates: 11/9/2022 TO 2/7/2023 (90 days). Frequency/Duration: 2x/week for 60 Day(s)  Interventions may include but are not limited to: (83450) Therapeutic exercise to develop ROM, strength, endurance and flexibility  (61588) Therapeutic activities using dynamic activities to improve function  (08006) Manual therapy techniques to improve joint and/or soft tissue mobility, ROM, and function as well as helping to decrease pain/spasms and swelling  (72854) Mechanical traction to address spinal/nerve/disc compression, improve joint mobility, and/or soft tissue flexibility  Home exercise program (HEP) development    GOALS     Short term goals to be met by 12/7/2022  (4 weeks):  Pt will show good recall of HEP requiring no more than minimal verbal cuing for proper form and technique. Pt will increase cervical AROM to mod restrictions without pain, in order to improve safety in ADLs ad driving. Pt will report pain decreased to intermittent for improved ability to perform ADLs and exercise interventions.    Pt will demonstrate decreased hypertonicity in the upper trap to no more than mild intensity. Pt will report symptoms have centralized to no farther than L CTJ indicating nerve healing and improving pain levels and muscle activation. Long term goals to be met by 1/4/2023  (8 weeks):  Pt will achieve cervical AROM to [facility] to improve safety when driving, comfort while reading, and sleep. Pt will improve NDI to </= 10 % disability, demonstrating improved overall function. Pt will test negatively for cervical radiculopathy TIC, indicating appropriate mobility and healing of nerve tissues. Pt will return to previous level of function without complaint of neck pain. [employer]  Access Code: [email or web address]  URL: [email or web address]  Date: 11/09/2022  Prepared by: Sreekanth Carey    Exercises  Cervical Extension AROM with Strap - 2 x daily - 2 sets - 10 reps - 5 hold  Seated Assisted Cervical Rotation with Towel - 2 x daily - 2 sets - 10 reps - 2 hold  Reverse Push Ups - 2 x daily - 2 sets - 15 reps - 2 hold

## 2022-11-16 ENCOUNTER — TELEPHONE (OUTPATIENT)
Dept: FAMILY MEDICINE CLINIC | Facility: CLINIC | Age: 82
End: 2022-11-16

## 2022-11-17 ENCOUNTER — OFFICE VISIT (OUTPATIENT)
Dept: ORTHOPEDIC SURGERY | Age: 82
End: 2022-11-17
Payer: MEDICARE

## 2022-11-17 DIAGNOSIS — Z78.9 IMPAIRED MOTOR CONTROL: ICD-10-CM

## 2022-11-17 DIAGNOSIS — Z74.09 IMPAIRED MOBILITY AND ADLS: ICD-10-CM

## 2022-11-17 DIAGNOSIS — M53.82 IMPAIRED RANGE OF MOTION OF CERVICAL SPINE: ICD-10-CM

## 2022-11-17 DIAGNOSIS — Z78.9 IMPAIRED MOBILITY AND ADLS: ICD-10-CM

## 2022-11-17 DIAGNOSIS — M54.12 CERVICAL RADICULOPATHY: ICD-10-CM

## 2022-11-17 DIAGNOSIS — M54.2 NECK PAIN: Primary | ICD-10-CM

## 2022-11-17 PROCEDURE — 97110 THERAPEUTIC EXERCISES: CPT

## 2022-11-17 PROCEDURE — 97530 THERAPEUTIC ACTIVITIES: CPT

## 2022-11-17 PROCEDURE — 97140 MANUAL THERAPY 1/> REGIONS: CPT

## 2022-11-17 NOTE — PROGRESS NOTES
[address] [facility]  [address] [facility] [address]  Dept: [phone]      Physical Therapy Daily Note     Referring MD: RAFAEL Raya*  Diagnosis:     ICD-10-CM    1. Neck pain  M54.2       2. Cervical radiculopathy  M54.12       3. Impaired mobility and ADLs  Z74.09     Z78.9       4. Impaired range of motion of cervical spine  M53.82       5. Impaired motor control  Z78.9            Surgery: n/a    Therapy precautions:None  Co-morbidities affecting plan of care: hypothyroid, HTN    PERTINENT MEDICAL HISTORY     Past medical and surgical history:   Past Medical History:   Diagnosis Date    Hypercholesteremia     Hypercholesterolemia     Hypertension     controlled with med    Other ill-defined conditions(799.89)     high cholesterol    Thyroid disease       Past Surgical History:   Procedure Laterality Date    BREAST BIOPSY Right 2014    cyst surgically removed    BREAST SURGERY      cyst removed right breast - skin    CHOLECYSTECTOMY      5/10; had ERCP 8/10 due to retained stone/shpincterectomy by Dr. Barrera Part hyst - fibroids    LAP,CHOLECYSTECTOMY  May 2010     Medications: reviewed in chart   Allergies: No Known Allergies     SUBJECTIVE     Chief complaints/history of injury: Patient reports onset of pain in the spring across both shoulders; she notes that movement of the right should will cause sharp pains across her neck into the left shoulder, and she experiences episodic numbness into all digits of the left hand  Date symptoms began: 04/22  [place] of condition: Chronic (continuous duration > 3 months)  Primary cause of current episode: Unspecified  How did symptoms start: unknown onset, no specific HARVEY  Describe current symptoms: pain in L side CTJ with radiation to distal LUE    Received previous therapy?  Yes; Number of therapy visits in the last 90 days: 0    Pain Assessment:  Pain location: L side CTJ to UT    Current (0-10 pain scale): 2/10  Average Pain/symptom intensity (0-10 scale)  Last 24 hours: 6/10  Last week (1-7 days): 7/10  How often do you feel symptoms? Constant (% of time)  Description: aching, dull, and sharp with contralateral motions  Aggravating factors:   turning head Left, looking up, reaching, and lifting/carrying  driving  Alleviating factors:   rest  lying down  avoid aggravating movements    Patient Stated Goals: reduce pain, return to PLOF, avoid surgery    Initial Evaluation: 11/9/22  Last Progress Note: n/a  Total Visits: 4    Total Timed Codes: 40 min, Total Treatment Time: 40 min    SUBJECTIVE     Pt reports undergoing facet injection yesterday; it felt good immediately, but the L side neck became sore in the evening. She also notes a dull ache in the posterior aspect of her L shoulder that has been more constant than related to movement. She also asks if it would be appropriate to try bowling (R hand) again    OBJECTIVE     AROM  Cervical Restriction Overpressure   Flexion no    Extension Mod    R side bend Mod    L side bend Max    R rotation No    L Rotation Min            Shoulder WNL      Palpation/joint mobility: hypomobile C3-5 (lateral (B)), CTJ*, 1st Rib (L, mod), hypertonic UT (R mild, L mod)    Shoulder: IRRST: ER<IR  Shoulder Impingement: Painful Arc: -  Infraspinatus: -  Malik-Tre: -  RTC Tendinopathy: Drop Arm: -  Empty Can: -    Treatment provided today:  Manual therapy (83967) x 20 min utilizing techniques to improve joint and/or soft tissue mobility, ROM, and function as well as helping to decrease pain/spasms and swelling. Palpation and assessment of soft tissue, muscles, and landmarks   Cervical mobilization  Lateral glides (C3-5) grade 2-3  Traction, grade 2-3  1st rib mobilization (L) grade 1-3  STM  Cervical paraspinals (B)  UT (B)    Therapeutic exercise (73341) x 10 min to develop ROM, strength, endurance and flexibility of the upper quarter.   Scapular retraction  Foam roll, 2min each  Repeated  ER  Row  45 degree abduction  Light band (head) 1x60s  Shoulder extension  Light band (head) 1x fatigue  Wall clock  Horizontal reach (B) 1x2min    Therapeutic activities (41555) x 10 min using dynamic activities to improve function. Throwing  Underhand (progressive (lax ball>6.5lb)    Patient Education on assessment of shoulder, positive indicators of progression, return to bowling with adjustments PRN    ASSESSMENT     Patient demonstrates:  Impairments in cervical AROM with associated mobility deficits in the mid cervical through CTJ and at the first rib  hypertonic UT  IRRST with ER<IR, but negative testing for impingement or RTC pathology     She has improvements in:  Cervical AROM, global mobility, and UQ tone s/p manual treatment  Shoulder pain and UE paresthesia with continued activities  Tolerance of repeated flexion with load, mimicking bowling activities     Continues with:  Presentation consistent with Neck Pain with Mobility Deficits and Radiating Pain per the Neck Pain Treatment Based Classification. PLAN     Continue therapy according to POC. Manual treatment for improved mobility and tissue extensibility to restore cervical AROM. Targeted engagement of scapular stabilizers to reduce UT compensation, with integration into functional tasks as tolerated. PLAN OF CARE     Effective Dates: 11/9/2022 TO 2/7/2023 (90 days).     Frequency/Duration: 2x/week for 60 Day(s)  Interventions may include but are not limited to: (81264) Therapeutic exercise to develop ROM, strength, endurance and flexibility  (75592) Therapeutic activities using dynamic activities to improve function  (86287) Manual therapy techniques to improve joint and/or soft tissue mobility, ROM, and function as well as helping to decrease pain/spasms and swelling  (95638) Mechanical traction to address spinal/nerve/disc compression, improve joint mobility, and/or soft tissue flexibility  Home exercise program (HEP) development    GOALS     Short term goals to be met by 12/7/2022  (4 weeks):  Pt will show good recall of HEP requiring no more than minimal verbal cuing for proper form and technique. Pt will increase cervical AROM to mod restrictions without pain, in order to improve safety in ADLs ad driving. Pt will report pain decreased to intermittent for improved ability to perform ADLs and exercise interventions. Pt will demonstrate decreased hypertonicity in the upper trap to no more than mild intensity. Pt will report symptoms have centralized to no farther than L CTJ indicating nerve healing and improving pain levels and muscle activation. Long term goals to be met by 1/4/2023  (8 weeks):  Pt will achieve cervical AROM to [facility] to improve safety when driving, comfort while reading, and sleep. Pt will improve NDI to </= 10 % disability, demonstrating improved overall function. Pt will test negatively for cervical radiculopathy TIC, indicating appropriate mobility and healing of nerve tissues. Pt will return to previous level of function without complaint of neck pain. [employer]  Access Code: [email or web address]  URL: [email or web address]  Date: 11/09/2022  Prepared by: Leatha Fried    Exercises  Cervical Extension AROM with Strap - 2 x daily - 2 sets - 10 reps - 5 hold  Seated Assisted Cervical Rotation with Towel - 2 x daily - 2 sets - 10 reps - 2 hold  Reverse Push Ups - 2 x daily - 2 sets - 15 reps - 2 hold

## 2022-11-21 ENCOUNTER — OFFICE VISIT (OUTPATIENT)
Dept: ORTHOPEDIC SURGERY | Age: 82
End: 2022-11-21
Payer: MEDICARE

## 2022-11-21 DIAGNOSIS — M54.12 CERVICAL RADICULOPATHY: ICD-10-CM

## 2022-11-21 DIAGNOSIS — Z78.9 IMPAIRED MOTOR CONTROL: ICD-10-CM

## 2022-11-21 DIAGNOSIS — M54.2 NECK PAIN: Primary | ICD-10-CM

## 2022-11-21 DIAGNOSIS — Z78.9 IMPAIRED MOBILITY AND ADLS: ICD-10-CM

## 2022-11-21 DIAGNOSIS — M53.82 IMPAIRED RANGE OF MOTION OF CERVICAL SPINE: ICD-10-CM

## 2022-11-21 DIAGNOSIS — Z74.09 IMPAIRED MOBILITY AND ADLS: ICD-10-CM

## 2022-11-21 PROCEDURE — 97530 THERAPEUTIC ACTIVITIES: CPT

## 2022-11-21 PROCEDURE — 97110 THERAPEUTIC EXERCISES: CPT

## 2022-11-21 PROCEDURE — 97140 MANUAL THERAPY 1/> REGIONS: CPT

## 2022-11-21 NOTE — PROGRESS NOTES
[address] [facility]  [address] [facility],[address] [address]  Dept: [phone]      Physical Therapy Daily Note     Referring MD: RAFAEL Lopez*  Diagnosis:     ICD-10-CM    1. Neck pain  M54.2       2. Cervical radiculopathy  M54.12       3. Impaired mobility and ADLs  Z74.09     Z78.9       4. Impaired range of motion of cervical spine  M53.82       5. Impaired motor control  Z78.9            Surgery: n/a    Therapy precautions:None  Co-morbidities affecting plan of care: hypothyroid, HTN    PERTINENT MEDICAL HISTORY     Past medical and surgical history:   Past Medical History:   Diagnosis Date    Hypercholesteremia     Hypercholesterolemia     Hypertension     controlled with med    Other ill-defined conditions(799.89)     high cholesterol    Thyroid disease       Past Surgical History:   Procedure Laterality Date    BREAST BIOPSY Right 2014    cyst surgically removed    BREAST SURGERY      cyst removed right breast - skin    CHOLECYSTECTOMY      5/10; had ERCP 8/10 due to retained stone/shpincterectomy by Dr. Leatha Dover hyst - fibroids    LAP,CHOLECYSTECTOMY  May 2010     Medications: reviewed in chart   Allergies: No Known Allergies     SUBJECTIVE     Chief complaints/history of injury: Patient reports onset of pain in the spring across both shoulders; she notes that movement of the right should will cause sharp pains across her neck into the left shoulder, and she experiences episodic numbness into all digits of the left hand  Date symptoms began: 04/22  [place] of condition: Chronic (continuous duration > 3 months)  Primary cause of current episode: Unspecified  How did symptoms start: unknown onset, no specific HARVEY  Describe current symptoms: pain in L side CTJ with radiation to distal LUE    Received previous therapy?  Yes; Number of therapy visits in the last 90 days: 0    Pain Assessment:  Pain location: L side CTJ to UT    Current (0-10 pain scale): 2/10  Average Pain/symptom intensity (0-10 scale)  Last 24 hours: 6/10  Last week (1-7 days): 7/10  How often do you feel symptoms? Constant (% of time)  Description: aching, dull, and sharp with contralateral motions  Aggravating factors:   turning head Left, looking up, reaching, and lifting/carrying  driving  Alleviating factors:   rest  lying down  avoid aggravating movements    Patient Stated Goals: reduce pain, return to PLOF, avoid surgery    Initial Evaluation: 11/9/22  Last Progress Note: n/a  Total Visits: 5    Total Timed Codes: 40 min, Total Treatment Time: 40 min    SUBJECTIVE     Pt reports having felt good since previous treatment, with no pain in either shoulder or her neck until yesterday. She did not have a specific incident, but had onset of pain in the L UT while walking around a store. It has maintained somewhat through today. She is planning to go bowling tomorrow. OBJECTIVE     AROM  Cervical Restriction Overpressure   Flexion no    Extension Mod    R side bend Mod    L side bend mod    R rotation Min (L UT tightness)    L Rotation no            Shoulder WNL      Palpation/joint mobility: hypomobile C7 (lateral (L to R) CTJ*, 1st Rib (L, mod), hypertonic UT (R mild, L mod)    Treatment provided today:  Manual therapy (52171) x 10 min utilizing techniques to improve joint and/or soft tissue mobility, ROM, and function as well as helping to decrease pain/spasms and swelling. Palpation and assessment of soft tissue, muscles, and landmarks   Cervical mobilization  Lateral glides (C7) grade 2-4  1st rib mobilization (L) grade 1-3    Therapeutic exercise (38479) x 20 min to develop ROM, strength, endurance and flexibility of the upper quarter.   Active release  L UT  L infraspinatus  Row  Mid  med band (head) 2x60s  45 degree abductionLight band (head) 1x60s  Horizontal abduction  V light band (head) 2x60s  Shoulder extensionMed band (head) 2x60s  Scapular retraction  Foam roll, 2min each  Repeated  ER  Wall clock  Horizontal reach (B) 1x2min    Therapeutic activities (39875) x 10 min using dynamic activities to improve function. Bowling  8m (6.5>12 lb ball)  Throwing  Underhand (progressive (lax ball>6.5lb)    Patient Education on presentation consistent with RTC weakness, positive indicators of progression    ASSESSMENT     Patient demonstrates:  Impairments in cervical AROM with mobility deficits in the lower cervical through CTJ and at the first rib  hypertonic L UT and isolated trigger points in the L infraspinatus  Onset of posterior L shoulder soreness with continued functional usage     She has improvements in:  Cervical AROM, global mobility, and UQ tone s/p manual treatment  Shoulder pain and UE paresthesia with continued activities  Tolerance of repeated flexion with load, performing bowling activities     Continues with:  Presentation consistent with Neck Pain with Mobility Deficits and Radiating Pain per the Neck Pain Treatment Based Classification  Mild indications of RTC degeneration and weakness    PLAN     Continue therapy according to POC. Manual treatment for improved mobility and tissue extensibility to restore cervical AROM. Targeted engagement of scapular stabilizers to reduce UT compensation, with integration into functional tasks as tolerated. PLAN OF CARE     Effective Dates: 11/9/2022 TO 2/7/2023 (90 days).     Frequency/Duration: 2x/week for 60 Day(s)  Interventions may include but are not limited to: (58987) Therapeutic exercise to develop ROM, strength, endurance and flexibility  (65278) Therapeutic activities using dynamic activities to improve function  (37644) Manual therapy techniques to improve joint and/or soft tissue mobility, ROM, and function as well as helping to decrease pain/spasms and swelling  (47498) Mechanical traction to address spinal/nerve/disc compression, improve joint mobility, and/or soft tissue flexibility  Home exercise program (HEP) development    GOALS     Short term goals to be met by 12/7/2022  (4 weeks):  Pt will show good recall of HEP requiring no more than minimal verbal cuing for proper form and technique. Pt will increase cervical AROM to mod restrictions without pain, in order to improve safety in ADLs ad driving. Pt will report pain decreased to intermittent for improved ability to perform ADLs and exercise interventions. Pt will demonstrate decreased hypertonicity in the upper trap to no more than mild intensity. Pt will report symptoms have centralized to no farther than L CTJ indicating nerve healing and improving pain levels and muscle activation. Long term goals to be met by 1/4/2023  (8 weeks):  Pt will achieve cervical AROM to [facility] to improve safety when driving, comfort while reading, and sleep. Pt will improve NDI to </= 10 % disability, demonstrating improved overall function. Pt will test negatively for cervical radiculopathy TIC, indicating appropriate mobility and healing of nerve tissues. Pt will return to previous level of function without complaint of neck pain. [employer]  Access Code: [email or web address]  URL: [email or web address]  Date: 11/09/2022  Prepared by: Dago La    Exercises  Cervical Extension AROM with Strap - 2 x daily - 2 sets - 10 reps - 5 hold  Seated Assisted Cervical Rotation with Towel - 2 x daily - 2 sets - 10 reps - 2 hold  Reverse Push Ups - 2 x daily - 2 sets - 15 reps - 2 hold

## 2022-11-25 ENCOUNTER — OFFICE VISIT (OUTPATIENT)
Dept: ORTHOPEDIC SURGERY | Age: 82
End: 2022-11-25
Payer: MEDICARE

## 2022-11-25 DIAGNOSIS — Z74.09 IMPAIRED MOBILITY AND ADLS: ICD-10-CM

## 2022-11-25 DIAGNOSIS — M54.12 CERVICAL RADICULOPATHY: ICD-10-CM

## 2022-11-25 DIAGNOSIS — M53.82 IMPAIRED RANGE OF MOTION OF CERVICAL SPINE: ICD-10-CM

## 2022-11-25 DIAGNOSIS — Z78.9 IMPAIRED MOTOR CONTROL: ICD-10-CM

## 2022-11-25 DIAGNOSIS — Z78.9 IMPAIRED MOBILITY AND ADLS: ICD-10-CM

## 2022-11-25 DIAGNOSIS — M54.2 NECK PAIN: Primary | ICD-10-CM

## 2022-11-25 PROCEDURE — 97140 MANUAL THERAPY 1/> REGIONS: CPT

## 2022-11-25 PROCEDURE — 97530 THERAPEUTIC ACTIVITIES: CPT

## 2022-11-25 PROCEDURE — 97110 THERAPEUTIC EXERCISES: CPT

## 2022-11-25 NOTE — PROGRESS NOTES
[facility] [address] [address]  Dept: [phone]      Physical Therapy Daily Note     Referring MD: RAFAEL French*  Diagnosis:     ICD-10-CM    1. Neck pain  M54.2       2. Cervical radiculopathy  M54.12       3. Impaired range of motion of cervical spine  M53.82       4. Impaired mobility and ADLs  Z74.09     Z78.9       5. Impaired motor control  Z78.9            Surgery: n/a    Therapy precautions:None  Co-morbidities affecting plan of care: hypothyroid, HTN    PERTINENT MEDICAL HISTORY     Past medical and surgical history:   Past Medical History:   Diagnosis Date    Hypercholesteremia     Hypercholesterolemia     Hypertension     controlled with med    Other ill-defined conditions(799.89)     high cholesterol    Thyroid disease       Past Surgical History:   Procedure Laterality Date    BREAST BIOPSY Right 2014    cyst surgically removed    BREAST SURGERY      cyst removed right breast - skin    CHOLECYSTECTOMY      5/10; had ERCP 8/10 due to retained stone/shpincterectomy by Dr. Mai Domignuez hyst - fibroids    LAP,CHOLECYSTECTOMY  May 2010     Medications: reviewed in chart   Allergies: No Known Allergies     SUBJECTIVE     Chief complaints/history of injury: Patient reports onset of pain in the spring across both shoulders; she notes that movement of the right should will cause sharp pains across her neck into the left shoulder, and she experiences episodic numbness into all digits of the left hand  Date symptoms began: 04/22  [place] of condition: Chronic (continuous duration > 3 months)  Primary cause of current episode: Unspecified  How did symptoms start: unknown onset, no specific HARVEY  Describe current symptoms: pain in L side CTJ with radiation to distal LUE    Received previous therapy?  Yes; Number of therapy visits in the last 90 days: 0    Pain Assessment:  Pain location: L side CTJ to UT    Current (0-10 pain UE/LE on bench, 6lb DB) 1x60s each  Shoulder extensionDB (contralateral UE/LE on bench, 6lb DB) 1x60s each  Med band (head) 2x60s      Therapeutic activities (89967) x 10 min using dynamic activities to improve function. Bowling  8m (6.5>12 lb ball)  Throwing  Underhand (progressive (lax ball>6.5lb)    Patient Education addition of DB row/extension to HEP    ASSESSMENT     Patient demonstrates:  Impairments in cervical AROM with mobility deficits in the CTJ and at the L first rib  Positive CRLF test on L  hypertonic L UT and isolated trigger points in the L infraspinatus     She has improvements in:  tightness with cervical AROM s/p manual treatment  Shoulder pain and UE paresthesia with continued activities, reporting full alleviation s/p thoracic mobilization  Tolerance of increased load, with use of DB     Continues with:  Presentation consistent with Neck Pain with Mobility Deficits and Radiating Pain per the Neck Pain Treatment Based Classification  Mild indications of RTC degeneration and weakness    PLAN     Continue therapy according to POC. Manual treatment for improved mobility and tissue extensibility to restore cervical AROM. Targeted engagement of scapular stabilizers to reduce UT compensation, with integration into functional tasks as tolerated. PLAN OF CARE     Effective Dates: 11/9/2022 TO 2/7/2023 (90 days).     Frequency/Duration: 2x/week for 60 Day(s)  Interventions may include but are not limited to: (19413) Therapeutic exercise to develop ROM, strength, endurance and flexibility  (67776) Therapeutic activities using dynamic activities to improve function  (54591) Manual therapy techniques to improve joint and/or soft tissue mobility, ROM, and function as well as helping to decrease pain/spasms and swelling  (72629) Mechanical traction to address spinal/nerve/disc compression, improve joint mobility, and/or soft tissue flexibility  Home exercise program (HEP) development    GOALS     Short term goals to be met by 12/7/2022  (4 weeks):  Pt will show good recall of HEP requiring no more than minimal verbal cuing for proper form and technique. Pt will increase cervical AROM to mod restrictions without pain, in order to improve safety in ADLs ad driving. Pt will report pain decreased to intermittent for improved ability to perform ADLs and exercise interventions. Pt will demonstrate decreased hypertonicity in the upper trap to no more than mild intensity. Pt will report symptoms have centralized to no farther than L CTJ indicating nerve healing and improving pain levels and muscle activation. Long term goals to be met by 1/4/2023  (8 weeks):  Pt will achieve cervical AROM to [facility] to improve safety when driving, comfort while reading, and sleep. Pt will improve NDI to </= 10 % disability, demonstrating improved overall function. Pt will test negatively for cervical radiculopathy TIC, indicating appropriate mobility and healing of nerve tissues. Pt will return to previous level of function without complaint of neck pain. [employer]  Access Code: [email or web address]  URL: [email or web address]  Date: 11/09/2022  Prepared by: Leatha Fried    Exercises  Cervical Extension AROM with Strap - 2 x daily - 2 sets - 10 reps - 5 hold  Seated Assisted Cervical Rotation with Towel - 2 x daily - 2 sets - 10 reps - 2 hold  Reverse Push Ups - 2 x daily - 2 sets - 15 reps - 2 hold

## 2022-11-29 ENCOUNTER — OFFICE VISIT (OUTPATIENT)
Dept: ORTHOPEDIC SURGERY | Age: 82
End: 2022-11-29
Payer: MEDICARE

## 2022-11-29 DIAGNOSIS — Z78.9 IMPAIRED MOTOR CONTROL: ICD-10-CM

## 2022-11-29 DIAGNOSIS — M54.2 NECK PAIN: Primary | ICD-10-CM

## 2022-11-29 DIAGNOSIS — M53.82 IMPAIRED RANGE OF MOTION OF CERVICAL SPINE: ICD-10-CM

## 2022-11-29 DIAGNOSIS — Z78.9 IMPAIRED MOBILITY AND ADLS: ICD-10-CM

## 2022-11-29 DIAGNOSIS — Z74.09 IMPAIRED MOBILITY AND ADLS: ICD-10-CM

## 2022-11-29 DIAGNOSIS — M54.12 CERVICAL RADICULOPATHY: ICD-10-CM

## 2022-11-29 PROCEDURE — 97140 MANUAL THERAPY 1/> REGIONS: CPT

## 2022-11-29 PROCEDURE — 97110 THERAPEUTIC EXERCISES: CPT

## 2022-11-29 NOTE — PROGRESS NOTES
[address] [facility]  [address] [facility] [address]  Dept: [phone]      Physical Therapy Daily Note     Referring MD: RAFAEL Mckeon*  Diagnosis:     ICD-10-CM    1. Neck pain  M54.2       2. Cervical radiculopathy  M54.12       3. Impaired range of motion of cervical spine  M53.82       4. Impaired mobility and ADLs  Z74.09     Z78.9       5. Impaired motor control  Z78.9            Surgery: n/a    Therapy precautions:None  Co-morbidities affecting plan of care: hypothyroid, HTN    PERTINENT MEDICAL HISTORY     Past medical and surgical history:   Past Medical History:   Diagnosis Date    Hypercholesteremia     Hypercholesterolemia     Hypertension     controlled with med    Other ill-defined conditions(799.89)     high cholesterol    Thyroid disease       Past Surgical History:   Procedure Laterality Date    BREAST BIOPSY Right 2014    cyst surgically removed    BREAST SURGERY      cyst removed right breast - skin    CHOLECYSTECTOMY      5/10; had ERCP 8/10 due to retained stone/shpincterectomy by Dr. Hector Contreras hyst - fibroids    LAP,CHOLECYSTECTOMY  May 2010     Medications: reviewed in chart   Allergies: No Known Allergies     SUBJECTIVE     Chief complaints/history of injury: Patient reports onset of pain in the spring across both shoulders; she notes that movement of the right should will cause sharp pains across her neck into the left shoulder, and she experiences episodic numbness into all digits of the left hand  Date symptoms began: 04/22  Severa Cuna of condition: Chronic (continuous duration > 3 months)  Primary cause of current episode: Unspecified  How did symptoms start: unknown onset, no specific HARVEY  Describe current symptoms: pain in L side CTJ with radiation to distal LUE    Received previous therapy?  Yes; Number of therapy visits in the last 90 days: 0    Pain Assessment:  Pain location: L side CTJ to UT    Current (0-10 pain scale): 2/10  Average Pain/symptom intensity (0-10 scale)  Last 24 hours: 6/10  Last week (1-7 days): 7/10  How often do you feel symptoms? Constant (% of time)  Description: aching, dull, and sharp with contralateral motions  Aggravating factors:   turning head Left, looking up, reaching, and lifting/carrying  driving  Alleviating factors:   rest  lying down  avoid aggravating movements    Patient Stated Goals: reduce pain, return to PLOF, avoid surgery    Initial Evaluation: 11/9/22  Last Progress Note: n/a  Total Visits: 6    Total Timed Codes: 40 min, Total Treatment Time: 40 min    SUBJECTIVE     Pt reports relative preservation of symptoms, only having mild difficulty in the L shoulder while decorating her tree this weekend. There continues to be sporadic numbness in the distal LUE. OBJECTIVE     AROM  Cervical Restriction Overpressure   Flexion no    Extension max    R side bend Mod    L side bend mod    R rotation Mod (L UT tightness)    L Rotation Mod            Shoulder WNL      Palpation/joint mobility: hypertonic UT (L mild)      Treatment provided today:  Manual therapy (18378) x 10 min utilizing techniques to improve joint and/or soft tissue mobility, ROM, and function as well as helping to decrease pain/spasms and swelling. **Patient educated on and provided verbal consent for grade 5 mobilizations  Palpation and assessment of soft tissue, muscles, and landmarks   Cervical mobilization  1st rib mobilization (L, semi-oh) grade 1-3    Therapeutic exercise (06829) x 30 min to develop ROM, strength, endurance and flexibility of the upper quarter.   Thoracic mobilization  Longitudinal (half round)  Static  Row  Mid  TRX (mid) 2x12  Horizontal abduction  TRX (mid) 2x12  Shoulder extensionMed band (supinated, head) 2x60s  Wall clock  Vertical/horizontal reach (B) 1x2min      Patient Education addition of DB row/extension to HEP    ASSESSMENT     Patient demonstrates:  hypertonic L UT with mild paresthesia to the distal LUE  Mild L scapular elevation approaching 90 degrees flexion/abduction     She has improvements in:  Paresthesia, with elimination following longitudinal mobilization  Tolerance of increased load and movement complexity, with no onset of pain or paresthesia, reporting her shoulder feels good with increased motion     Continues with:  Presentation consistent with Neck Pain with Mobility Deficits and Radiating Pain per the Neck Pain Treatment Based Classification  Mild indications of RTC degeneration and weakness    PLAN     Continue therapy according to POC. Manual treatment for improved mobility and tissue extensibility to restore cervical AROM. Targeted engagement of scapular stabilizers to reduce UT compensation, with integration into functional tasks as tolerated. PLAN OF CARE     Effective Dates: 11/9/2022 TO 2/7/2023 (90 days). Frequency/Duration: 2x/week for 60 Day(s)  Interventions may include but are not limited to: (74319) Therapeutic exercise to develop ROM, strength, endurance and flexibility  (67826) Therapeutic activities using dynamic activities to improve function  (46780) Manual therapy techniques to improve joint and/or soft tissue mobility, ROM, and function as well as helping to decrease pain/spasms and swelling  (43247) Mechanical traction to address spinal/nerve/disc compression, improve joint mobility, and/or soft tissue flexibility  Home exercise program (HEP) development    GOALS     Short term goals to be met by 12/7/2022  (4 weeks):  Pt will show good recall of HEP requiring no more than minimal verbal cuing for proper form and technique. Pt will increase cervical AROM to mod restrictions without pain, in order to improve safety in ADLs ad driving. Pt will report pain decreased to intermittent for improved ability to perform ADLs and exercise interventions. Pt will demonstrate decreased hypertonicity in the upper trap to no more than mild intensity.   Pt will report symptoms have centralized to no farther than L CTJ indicating nerve healing and improving pain levels and muscle activation. Long term goals to be met by 1/4/2023  (8 weeks):  Pt will achieve cervical AROM to [facility] to improve safety when driving, comfort while reading, and sleep. Pt will improve NDI to </= 10 % disability, demonstrating improved overall function. Pt will test negatively for cervical radiculopathy TIC, indicating appropriate mobility and healing of nerve tissues. Pt will return to previous level of function without complaint of neck pain. [employer]  Access Code: [email or web address]  URL: [email or web address]  Date: 11/09/2022  Prepared by: Jose Feldman    Exercises  Cervical Extension AROM with Strap - 2 x daily - 2 sets - 10 reps - 5 hold  Seated Assisted Cervical Rotation with Towel - 2 x daily - 2 sets - 10 reps - 2 hold  Reverse Push Ups - 2 x daily - 2 sets - 15 reps - 2 hold

## 2022-12-02 ENCOUNTER — OFFICE VISIT (OUTPATIENT)
Dept: ORTHOPEDIC SURGERY | Age: 82
End: 2022-12-02

## 2022-12-02 DIAGNOSIS — Z78.9 IMPAIRED MOTOR CONTROL: ICD-10-CM

## 2022-12-02 DIAGNOSIS — Z78.9 IMPAIRED MOBILITY AND ADLS: ICD-10-CM

## 2022-12-02 DIAGNOSIS — M54.12 CERVICAL RADICULOPATHY: ICD-10-CM

## 2022-12-02 DIAGNOSIS — Z74.09 IMPAIRED MOBILITY AND ADLS: ICD-10-CM

## 2022-12-02 DIAGNOSIS — M53.82 IMPAIRED RANGE OF MOTION OF CERVICAL SPINE: ICD-10-CM

## 2022-12-02 DIAGNOSIS — M54.2 NECK PAIN: Primary | ICD-10-CM

## 2022-12-02 NOTE — PROGRESS NOTES
111 [facility]  [address] [facility] [address]  Dept: [phone]      Physical Therapy Daily Note     Referring MD: RAFAEL Ureña*  Diagnosis:     ICD-10-CM    1. Neck pain  M54.2       2. Cervical radiculopathy  M54.12       3. Impaired range of motion of cervical spine  M53.82       4. Impaired mobility and ADLs  Z74.09     Z78.9       5. Impaired motor control  Z78.9            Surgery: n/a    Therapy precautions:None  Co-morbidities affecting plan of care: hypothyroid, HTN    PERTINENT MEDICAL HISTORY     Past medical and surgical history:   Past Medical History:   Diagnosis Date    Hypercholesteremia     Hypercholesterolemia     Hypertension     controlled with med    Other ill-defined conditions(799.89)     high cholesterol    Thyroid disease       Past Surgical History:   Procedure Laterality Date    BREAST BIOPSY Right 2014    cyst surgically removed    BREAST SURGERY      cyst removed right breast - skin    CHOLECYSTECTOMY      5/10; had ERCP 8/10 due to retained stone/shpincterectomy by Dr. Carli Foley hyst - fibroids    LAP,CHOLECYSTECTOMY  May 2010     Medications: reviewed in chart   Allergies: No Known Allergies     SUBJECTIVE     Chief complaints/history of injury: Patient reports onset of pain in the spring across both shoulders; she notes that movement of the right should will cause sharp pains across her neck into the left shoulder, and she experiences episodic numbness into all digits of the left hand  Date symptoms began: 04/22  Yakov Mediate of condition: Chronic (continuous duration > 3 months)  Primary cause of current episode: Unspecified  How did symptoms start: unknown onset, no specific HARVEY  Describe current symptoms: pain in L side CTJ with radiation to distal LUE    Received previous therapy?  Yes; Number of therapy visits in the last 90 days: 0    Pain Assessment:  Pain location: L side CTJ to UT    Current (0-10 pain scale): 2/10  Average Pain/symptom intensity (0-10 scale)  Last 24 hours: 6/10  Last week (1-7 days): 7/10  How often do you feel symptoms? Constant (% of time)  Description: aching, dull, and sharp with contralateral motions  Aggravating factors:   turning head Left, looking up, reaching, and lifting/carrying  driving  Alleviating factors:   rest  lying down  avoid aggravating movements    Patient Stated Goals: reduce pain, return to PLOF, avoid surgery    Initial Evaluation: 11/9/22  Last Progress Note: n/a  Total Visits: 7    Total Timed Codes: 40 min, Total Treatment Time: 40 min    SUBJECTIVE     Pt reports continued preservation of symptom relief in normal ADLs, only have onset of pain with loaded reaching, such as when she tried to lift her bowling ball from her locker. There continues to be sporadic numbness in the distal LUE, but it seems to be less frequent. She notes a knot in her L UT which developed a day or two ago. .    OBJECTIVE     AROM  Cervical Restriction Overpressure   Flexion no    Extension max    R side bend Mod    L side bend mod    R rotation min    L Rotation Mod            Shoulder WNL      Palpation/joint mobility: [id number] Rib (L, mod), hypertonic UT (L mild)      Treatment provided today:  Manual therapy (89732) x 15 min utilizing techniques to improve joint and/or soft tissue mobility, ROM, and function as well as helping to decrease pain/spasms and swelling. **Patient educated on and provided verbal consent for grade 5 mobilizations  Palpation and assessment of soft tissue, muscles, and landmarks   STM  UT (B)  Cervical [id number] rib mobilization (L, supine) grade 2-4    Therapeutic exercise (97574) x 25 min to develop ROM, strength, endurance and flexibility of the upper quarter.   Row  High  V light band (head) 2x60s  Horizontal abduction  light band (head) 2x60s  Shoulder extensionMed band  Alternating contralateral isometric, 2x60s  Y's (v light band (knees)) 2x60s      Patient Education on continued efforts in posterior strength; use of UQ engagement activities prior to bowling to reduce onset of symptoms    ASSESSMENT     Patient demonstrates:  hypertonic L UT with isolated trigger point and mild hypomobility of L 1st rib  Mild L scapular elevation approaching 90 degrees flexion/abduction     She has improvements in:  Symptom management between treatments, with reduced onset of paresthesia  Tolerance of increased load and movement complexity, with no onset of pain or paresthesia throughout     Continues with:  Presentation consistent with Neck Pain with Mobility Deficits and Radiating Pain per the Neck Pain Treatment Based Classification  Mild indications of RTC degeneration and weakness    PLAN     Continue therapy according to POC. Manual treatment for improved mobility and tissue extensibility to restore cervical AROM. Targeted engagement of scapular stabilizers to reduce UT compensation, with integration into functional tasks as tolerated. PLAN OF CARE     Effective Dates: 11/9/2022 TO 2/7/2023 (90 days). Frequency/Duration: 2x/week for 60 Day(s)  Interventions may include but are not limited to: (11871) Therapeutic exercise to develop ROM, strength, endurance and flexibility  (97777) Therapeutic activities using dynamic activities to improve function  (37074) Manual therapy techniques to improve joint and/or soft tissue mobility, ROM, and function as well as helping to decrease pain/spasms and swelling  (40310) Mechanical traction to address spinal/nerve/disc compression, improve joint mobility, and/or soft tissue flexibility  Home exercise program (HEP) development    GOALS     Short term goals to be met by 12/7/2022  (4 weeks):  Pt will show good recall of HEP requiring no more than minimal verbal cuing for proper form and technique. Pt will increase cervical AROM to mod restrictions without pain, in order to improve safety in ADLs ad driving.   Pt will report pain decreased to intermittent for improved ability to perform ADLs and exercise interventions. Pt will demonstrate decreased hypertonicity in the upper trap to no more than mild intensity. Pt will report symptoms have centralized to no farther than L CTJ indicating nerve healing and improving pain levels and muscle activation. Long term goals to be met by 1/4/2023  (8 weeks):  Pt will achieve cervical AROM to [facility] to improve safety when driving, comfort while reading, and sleep. Pt will improve NDI to </= 10 % disability, demonstrating improved overall function. Pt will test negatively for cervical radiculopathy TIC, indicating appropriate mobility and healing of nerve tissues. Pt will return to previous level of function without complaint of neck pain. [employer]  Access Code: [email or web address]  URL: [email or web address]  Date: 11/09/2022  Prepared by: Sreekanth Carey    Exercises  Cervical Extension AROM with Strap - 2 x daily - 2 sets - 10 reps - 5 hold  Seated Assisted Cervical Rotation with Towel - 2 x daily - 2 sets - 10 reps - 2 hold  Reverse Push Ups - 2 x daily - 2 sets - 15 reps - 2 hold

## 2022-12-05 ENCOUNTER — OFFICE VISIT (OUTPATIENT)
Dept: ORTHOPEDIC SURGERY | Age: 82
End: 2022-12-05
Payer: MEDICARE

## 2022-12-05 DIAGNOSIS — M54.2 NECK PAIN: Primary | ICD-10-CM

## 2022-12-05 DIAGNOSIS — M53.82 IMPAIRED RANGE OF MOTION OF CERVICAL SPINE: ICD-10-CM

## 2022-12-05 DIAGNOSIS — Z74.09 IMPAIRED MOBILITY AND ADLS: ICD-10-CM

## 2022-12-05 DIAGNOSIS — Z78.9 IMPAIRED MOTOR CONTROL: ICD-10-CM

## 2022-12-05 DIAGNOSIS — Z78.9 IMPAIRED MOBILITY AND ADLS: ICD-10-CM

## 2022-12-05 DIAGNOSIS — M54.12 CERVICAL RADICULOPATHY: ICD-10-CM

## 2022-12-05 PROCEDURE — 97530 THERAPEUTIC ACTIVITIES: CPT

## 2022-12-05 PROCEDURE — 97140 MANUAL THERAPY 1/> REGIONS: CPT

## 2022-12-05 PROCEDURE — 97110 THERAPEUTIC EXERCISES: CPT

## 2022-12-05 NOTE — PROGRESS NOTES
111 [facility]  [address] [facility] [address]  Dept: [phone]      Physical Therapy Daily Note     Referring MD: RAFAEL Santiago*  Diagnosis:     ICD-10-CM    1. Neck pain  M54.2       2. Cervical radiculopathy  M54.12       3. Impaired range of motion of cervical spine  M53.82       4. Impaired mobility and ADLs  Z74.09     Z78.9       5. Impaired motor control  Z78.9            Surgery: n/a    Therapy precautions:None  Co-morbidities affecting plan of care: hypothyroid, HTN    PERTINENT MEDICAL HISTORY     Past medical and surgical history:   Past Medical History:   Diagnosis Date    Hypercholesteremia     Hypercholesterolemia     Hypertension     controlled with med    Other ill-defined conditions(799.89)     high cholesterol    Thyroid disease       Past Surgical History:   Procedure Laterality Date    BREAST BIOPSY Right 2014    cyst surgically removed    BREAST SURGERY      cyst removed right breast - skin    CHOLECYSTECTOMY      5/10; had ERCP 8/10 due to retained stone/shpincterectomy by Dr. Christina Lancaster hyst - fibroids    LAP,CHOLECYSTECTOMY  May 2010     Medications: reviewed in chart   Allergies: No Known Allergies     SUBJECTIVE     Chief complaints/history of injury: Patient reports onset of pain in the spring across both shoulders; she notes that movement of the right should will cause sharp pains across her neck into the left shoulder, and she experiences episodic numbness into all digits of the left hand  Date symptoms began: 04/22  Kyle Tejada of condition: Chronic (continuous duration > 3 months)  Primary cause of current episode: Unspecified  How did symptoms start: unknown onset, no specific HARVEY  Describe current symptoms: pain in L side CTJ with radiation to distal LUE    Received previous therapy?  Yes; Number of therapy visits in the last 90 days: 0    Pain Assessment:  Pain location: L side CTJ to UT    Current (0-10 pain scale): 2/10  Average Pain/symptom intensity (0-10 scale)  Last 24 hours: 6/10  Last week (1-7 days): 7/10  How often do you feel symptoms? Constant (% of time)  Description: aching, dull, and sharp with contralateral motions  Aggravating factors:   turning head Left, looking up, reaching, and lifting/carrying  driving  Alleviating factors:   rest  lying down  avoid aggravating movements    Patient Stated Goals: reduce pain, return to PLOF, avoid surgery    Initial Evaluation: 11/9/22  Last Progress Note: n/a  Total Visits: 9    Total Timed Codes: 40 min, Total Treatment Time: 40 min    SUBJECTIVE     Pt reports continued preservation of symptom relief in normal ADLs, only have onset of pain with long duration loading such as pushing her grocery cart; her pain is primarily at the lateral L upper trap, with occasion at the deltoid tuberosity. There continues to be sporadic numbness in the distal LUE, but it seems to be less frequent, and not occurring every time she has shoulder pain. OBJECTIVE       Palpation/joint mobility: hypomobile CTJ, 1st Rib (L, mod), hypertonic UT (L mild)    Cervical rotation/lateral flexion: + (L)    Treatment provided today:  Manual therapy (65339) x 15 min utilizing techniques to improve joint and/or soft tissue mobility, ROM, and function as well as helping to decrease pain/spasms and swelling. **Patient educated on and provided verbal consent for grade 5 mobilizations  Palpation and assessment of soft tissue, muscles, and landmarks   STM  UT (B)  Cervical [id number] rib mobilization (L, supine) grade 2-4  Lateral glides (C7) grade 2-4  GHJ mobilization (L)  Posterior, grade 2-4  Inferior, grade 2-4    Therapeutic activities (43379) x 10 min using dynamic activities to improve function. Carry  [place] (2x8lb DB) 3x30m  Wall clock (B) 1x2min  Therapeutic exercise (01703) x 15 min to develop ROM, strength, endurance and flexibility of the upper quarter.   Dynamic UQ warm up (unweighted)  High row  ER (0 abduction)  Horizontal abduction  PROM  GHJ (MWM)  Flexion  scaption  Row  Mid  med band (head) 1x2min  Horizontal abduction  light band (head) 1x2min  Shoulder extensionMed band  Alternating contralateral isometric, 1x2min    Patient Education on continued efforts in posterior strength; use of UQ engagement activities prior to bowling to reduce onset of symptoms    ASSESSMENT     Patient demonstrates:  hypertonic L UT with isolated trigger point and mild hypomobility of L 1st rib and CTJ  Restricted mobility of L GHJ posterior and inferior     She has improvements in:  Symptom management between treatments, with reduced onset of paresthesia and pain  Tolerance of  Loaded functional activities, but with mild onset of pain at deltoid tuberosity  increased movement duration, with progressive alleviation of symptoms     Continues with:  Presentation consistent with Neck Pain with Mobility Deficits and Radiating Pain per the Neck Pain Treatment Based Classification  Mild indications of RTC degeneration and weakness    PLAN     Continue therapy according to POC. Manual treatment for improved mobility and tissue extensibility to restore cervical AROM. Targeted engagement of scapular stabilizers to reduce UT compensation, with integration into functional tasks as tolerated. PLAN OF CARE     Effective Dates: 11/9/2022 TO 2/7/2023 (90 days).     Frequency/Duration: 2x/week for 60 Day(s)  Interventions may include but are not limited to: (26194) Therapeutic exercise to develop ROM, strength, endurance and flexibility  (88080) Therapeutic activities using dynamic activities to improve function  (72708) Manual therapy techniques to improve joint and/or soft tissue mobility, ROM, and function as well as helping to decrease pain/spasms and swelling  (55173) Mechanical traction to address spinal/nerve/disc compression, improve joint mobility, and/or soft tissue flexibility  Home exercise program (HEP) development    GOALS     Short term goals to be met by 12/7/2022  (4 weeks):  Pt will show good recall of HEP requiring no more than minimal verbal cuing for proper form and technique. Pt will increase cervical AROM to mod restrictions without pain, in order to improve safety in ADLs ad driving. Pt will report pain decreased to intermittent for improved ability to perform ADLs and exercise interventions. Pt will demonstrate decreased hypertonicity in the upper trap to no more than mild intensity. Pt will report symptoms have centralized to no farther than L CTJ indicating nerve healing and improving pain levels and muscle activation. Long term goals to be met by 1/4/2023  (8 weeks):  Pt will achieve cervical AROM to [facility] to improve safety when driving, comfort while reading, and sleep. Pt will improve NDI to </= 10 % disability, demonstrating improved overall function. Pt will test negatively for cervical radiculopathy TIC, indicating appropriate mobility and healing of nerve tissues. Pt will return to previous level of function without complaint of neck pain. [employer]  Access Code: [email or web address]  URL: [email or web address]  Date: 11/09/2022  Prepared by: Roxana Waterville    Exercises  Cervical Extension AROM with Strap - 2 x daily - 2 sets - 10 reps - 5 hold  Seated Assisted Cervical Rotation with Towel - 2 x daily - 2 sets - 10 reps - 2 hold  Reverse Push Ups - 2 x daily - 2 sets - 15 reps - 2 hold

## 2022-12-09 ENCOUNTER — OFFICE VISIT (OUTPATIENT)
Dept: ORTHOPEDIC SURGERY | Age: 82
End: 2022-12-09

## 2022-12-09 DIAGNOSIS — M54.12 CERVICAL RADICULOPATHY: ICD-10-CM

## 2022-12-09 DIAGNOSIS — Z78.9 IMPAIRED MOTOR CONTROL: ICD-10-CM

## 2022-12-09 DIAGNOSIS — Z78.9 IMPAIRED MOBILITY AND ADLS: ICD-10-CM

## 2022-12-09 DIAGNOSIS — M54.2 NECK PAIN: Primary | ICD-10-CM

## 2022-12-09 DIAGNOSIS — Z74.09 IMPAIRED MOBILITY AND ADLS: ICD-10-CM

## 2022-12-09 DIAGNOSIS — M53.82 IMPAIRED RANGE OF MOTION OF CERVICAL SPINE: ICD-10-CM

## 2022-12-09 NOTE — PROGRESS NOTES
[address] [facility]  [address] [facility] [address]  Dept: [phone]      Physical Therapy Daily Note     Referring MD: RAFAEL Wiley*  Diagnosis:    Diagnosis Orders   1. Neck pain        2. Cervical radiculopathy        3. Impaired range of motion of cervical spine        4. Impaired mobility and ADLs        5. Impaired motor control            Surgery: n/a    Therapy precautions:None  Co-morbidities affecting plan of care: hypothyroid, HTN    PERTINENT MEDICAL HISTORY     Past medical and surgical history:   Past Medical History:   Diagnosis Date    Hypercholesteremia     Hypercholesterolemia     Hypertension     controlled with med    Other ill-defined conditions(799.89)     high cholesterol    Thyroid disease       Past Surgical History:   Procedure Laterality Date    BREAST BIOPSY Right 2014    cyst surgically removed    BREAST SURGERY      cyst removed right breast - skin    CHOLECYSTECTOMY      5/10; had ERCP 8/10 due to retained stone/shpincterectomy by Dr. Kierra burgos - fibroids    LAP,CHOLECYSTECTOMY  May 2010     Medications: reviewed in chart   Allergies: No Known Allergies     SUBJECTIVE     Chief complaints/history of injury: Patient reports onset of pain in the spring across both shoulders; she notes that movement of the right should will cause sharp pains across her neck into the left shoulder, and she experiences episodic numbness into all digits of the left hand  Date symptoms began: 04/22  Mina Bigness of condition: Chronic (continuous duration > 3 months)  Primary cause of current episode: Unspecified  How did symptoms start: unknown onset, no specific HARVEY  Describe current symptoms: pain in L side CTJ with radiation to distal LUE    Received previous therapy?  Yes; Number of therapy visits in the last 90 days: 0    Pain Assessment:  Pain location: L side CTJ to UT    Current (0-10 pain scale): 2/10  Average Pain/symptom intensity (0-10 scale)  Last 24 hours: 6/10  Last week (1-7 days): 7/10  How often do you feel symptoms? Constant (% of time)  Description: aching, dull, and sharp with contralateral motions  Aggravating factors:   turning head Left, looking up, reaching, and lifting/carrying  driving  Alleviating factors:   rest  lying down  avoid aggravating movements    Patient Stated Goals: reduce pain, return to PLOF, avoid surgery    Initial Evaluation: 11/9/22  Last Progress Note: n/a  Total Visits: 9    Total Timed Codes: 40 min, Total Treatment Time: 50 min    SUBJECTIVE     Pt w/cervical pain primarily with rotation or looking up from flexed position. Pain radiating into left upper arm but usually resolves with raising arm overhead. OBJECTIVE       Palpation/joint mobility: hypomobile CTJ, 1st Rib (L, mod), hypertonic UT, levaror scap and scalesns  Observation: moderate thoracic hyperkyphosis with hypomobility throughout. Treatment provided today:  Manual therapy (03782) x 25 min utilizing techniques to improve joint and/or soft tissue mobility, ROM, and function as well as helping to decrease pain/spasms and swelling. **Patient educated on and provided verbal consent for grade 5 mobilizations  Palpation and assessment of soft tissue, muscles, and landmarks   STM  Occipitals  LS  scalenes  UT (B)  Cervical mobilization1-2nd rib mobilization grd II/III  Lateral glides c4-7 grd II  Thoracic mobilization grd II/III T1-4    Therapeutic exercise (18725) x 15 min to develop ROM, strength, endurance and flexibility of the upper quarter.   Dynamic UQ warm up (unweighted)  High row  ER (0 abduction)  Horizontal abduction  PROM  GHJ (MWM)  Flexion  scaption  Row  Mid  med band (head) 1x2min  Horizontal abduction  light band (head) 1x2min  Shoulder extensionMed band  Alternating contralateral isometric, 1x2min  Scapular retraction  Repeated  ER    Patient Education on centralization vs peripheralization of UE symptoms; use of towel roll prn for comfort; avoiding prolonged postural deviations to minimize UE symptoms. Intermittent mechanical traction (59895) x 10 min to cervical spine to address spinal/nerve/disc compression, improve joint mobility, and/or soft tissue flexibility utilizing [employer] device model TXE-7, serial # [id number].  20 pounds pressure, 20 sec on/15 off    ASSESSMENT     Increased radicular symptoms into left UE today. Unable to tolerate previous exercises due to pain. \"Catching with return to neutral from rotation or flexion. Trial of cervical traction with resolution of left UE symptoms. STG partially met with recent flare up. PLAN     Pt states she has \"a busy weekend\". Encouraged to take brief breaks every 1-2 hours. Assess response. Cont mechanical traction as appropriate. Resume previous exercises as able. PLAN OF CARE     Effective Dates: 11/9/2022 TO 2/7/2023 (90 days). Frequency/Duration: 2x/week for 60 Day(s)  Interventions may include but are not limited to: (11553) Therapeutic exercise to develop ROM, strength, endurance and flexibility  (85350) Therapeutic activities using dynamic activities to improve function  (13895) Manual therapy techniques to improve joint and/or soft tissue mobility, ROM, and function as well as helping to decrease pain/spasms and swelling  (94494) Mechanical traction to address spinal/nerve/disc compression, improve joint mobility, and/or soft tissue flexibility  Home exercise program (HEP) development    GOALS     Short term goals to be met by 12/7/2022  (4 weeks):  Pt will show good recall of HEP requiring no more than minimal verbal cuing for proper form and technique.  MET  Pt will increase cervical AROM to mod restrictions without pain, in order to improve safety in ADLs ad driving. - MET  Pt will report pain decreased to intermittent for improved ability to perform ADLs and exercise interventions.  - MET  Pt will demonstrate decreased hypertonicity in the upper trap to no more than mild intensity. - Not Met  Pt will report symptoms have centralized to no farther than L CTJ indicating nerve healing and improving pain levels and muscle activation. - Not Met    Long term goals to be met by 1/4/2023  (8 weeks):  Pt will achieve cervical AROM to [facility] to improve safety when driving, comfort while reading, and sleep. Pt will improve NDI to </= 10 % disability, demonstrating improved overall function. Pt will test negatively for cervical radiculopathy TIC, indicating appropriate mobility and healing of nerve tissues. Pt will return to previous level of function without complaint of neck pain. Topic  Access Code: [email or web address]  URL: [email or web address]  Date: 11/09/2022  Prepared by: Marco Lutz    Exercises  Cervical Extension AROM with Strap - 2 x daily - 2 sets - 10 reps - 5 hold  Seated Assisted Cervical Rotation with Towel - 2 x daily - 2 sets - 10 reps - 2 hold  Reverse Push Ups - 2 x daily - 2 sets - 15 reps - 2 hold

## 2022-12-13 ENCOUNTER — OFFICE VISIT (OUTPATIENT)
Dept: ORTHOPEDIC SURGERY | Age: 82
End: 2022-12-13
Payer: MEDICARE

## 2022-12-13 DIAGNOSIS — M48.02 FORAMINAL STENOSIS OF CERVICAL REGION: ICD-10-CM

## 2022-12-13 DIAGNOSIS — Z78.9 IMPAIRED MOTOR CONTROL: ICD-10-CM

## 2022-12-13 DIAGNOSIS — M54.2 NECK PAIN: Primary | ICD-10-CM

## 2022-12-13 DIAGNOSIS — M53.82 IMPAIRED RANGE OF MOTION OF CERVICAL SPINE: ICD-10-CM

## 2022-12-13 DIAGNOSIS — Z78.9 IMPAIRED MOBILITY AND ADLS: ICD-10-CM

## 2022-12-13 DIAGNOSIS — M54.12 CERVICAL RADICULOPATHY: ICD-10-CM

## 2022-12-13 DIAGNOSIS — Z74.09 IMPAIRED MOBILITY AND ADLS: ICD-10-CM

## 2022-12-13 PROCEDURE — 97110 THERAPEUTIC EXERCISES: CPT | Performed by: PHYSICAL THERAPIST

## 2022-12-13 PROCEDURE — 97012 MECHANICAL TRACTION THERAPY: CPT | Performed by: PHYSICAL THERAPIST

## 2022-12-13 PROCEDURE — 97140 MANUAL THERAPY 1/> REGIONS: CPT | Performed by: PHYSICAL THERAPIST

## 2022-12-13 NOTE — PROGRESS NOTES
[address] [facility]  [address] [facility],[address] [address]  Dept: [phone]      Physical Therapy Daily Note     Referring MD: RAFEAL Santiago*  Diagnosis:    Diagnosis Orders   1. Neck pain        2. Cervical radiculopathy        3. Impaired range of motion of cervical spine        4. Impaired mobility and ADLs        5. Impaired motor control        6. Foraminal stenosis of cervical region          Surgery: n/a    Therapy precautions:None  Co-morbidities affecting plan of care: hypothyroid, HTN    PERTINENT MEDICAL HISTORY     Past medical and surgical history:   Past Medical History:   Diagnosis Date    Hypercholesteremia     Hypercholesterolemia     Hypertension     controlled with med    Other ill-defined conditions(489.89)     high cholesterol    Thyroid disease       Past Surgical History:   Procedure Laterality Date    BREAST BIOPSY Right 2014    cyst surgically removed    BREAST SURGERY      cyst removed right breast - skin    CHOLECYSTECTOMY      5/10; had ERCP 8/10 due to retained stone/shpincterectomy by Dr. Christina Lancaster hyst - fibroids    LAP,CHOLECYSTECTOMY  May 2010     Medications: reviewed in chart   Allergies: No Known Allergies     SUBJECTIVE     Chief complaints/history of injury: Patient reports onset of pain in the spring across both shoulders; she notes that movement of the right should will cause sharp pains across her neck into the left shoulder, and she experiences episodic numbness into all digits of the left hand  Date symptoms began: 04/22  Kyle Tejada of condition: Chronic (continuous duration > 3 months)  Primary cause of current episode: Unspecified  How did symptoms start: unknown onset, no specific HARVEY  Describe current symptoms: pain in L side CTJ with radiation to distal LUE    Received previous therapy?  Yes; Number of therapy visits in the last 90 days: 0    Pavoid aggravating movements    Patient Stated Goals: reduce pain, return to PLOF, avoid surgery    Initial Evaluation: 11/9/22  Last Progress Note: n/a  Total Visits: 11    Total Timed Codes: 40 min, Total Treatment Time: 55 min    SUBJECTIVE   Pt reports increased cervical stifness but decreased arm pain since last visit. Unsure if result of mechanical traction or not but agreeable to second session. OBJECTIVE       Palpation/joint mobility: hypomobile CTJ, 1st Rib (L, mod), hypertonic UT, levaror scap and scalesns  Observation: moderate thoracic hyperkyphosis with hypomobility throughout. Treatment provided today:  Manual therapy (39583) x 25 min utilizing techniques to improve joint and/or soft tissue mobility, ROM, and function as well as helping to decrease pain/spasms and swelling. **Patient educated on and provided verbal consent for grade 5 mobilizations  Palpation and assessment of soft tissue, muscles, and landmarks   STM  Occipitals  LS  scalenes  UT (B)  Cervical mobilization1-2nd rib mobilization grd II/III  Lateral glides c4-7 grd II  Thoracic mobilization grd II/III T1-4    Therapeutic exercise (99294) x 15 min to develop ROM, strength, endurance and flexibility of the upper quarter. Dynamic UQ warm up (unweighted)  High row  ER (0 abduction)  Horizontal abduction  PROM  GHJ (MWM)  Flexion  scaption  Row  Mid  med band (head) 1x2min  Horizontal abduction  light band (head) 1x2min  Shoulder extensionMed band  Alternating contralateral isometric, 1x2min  Scapular retraction  Repeated  ER    Patient Education on slow and graduated return to exercise program    Intermittent mechanical traction (07261) x 15 min to cervical spine to address spinal/nerve/disc compression, improve joint mobility, and/or soft tissue flexibility utilizing [employer] device model TXE-7, serial # [id number].  20 pounds pressure, 20 sec on/15 off    ASSESSMENT     Increased radicular symptoms into left UE today. Unable to tolerate previous exercises due to pain.  \"Catching with return to neutral from rotation or flexion. Trial of cervical traction with resolution of left UE symptoms. STG partially met with recent flare up. PLAN     Pt states she has \"a busy weekend\". Encouraged to take brief breaks every 1-2 hours. Assess response. Cont mechanical traction as appropriate. Resume previous exercises as able. PLAN OF CARE     Effective Dates: 11/9/2022 TO 2/7/2023 (90 days). Frequency/Duration: 2x/week for 60 Day(s)  Interventions may include but are not limited to: (09574) Therapeutic exercise to develop ROM, strength, endurance and flexibility  (59790) Therapeutic activities using dynamic activities to improve function  (94778) Manual therapy techniques to improve joint and/or soft tissue mobility, ROM, and function as well as helping to decrease pain/spasms and swelling  (99292) Mechanical traction to address spinal/nerve/disc compression, improve joint mobility, and/or soft tissue flexibility  Home exercise program (HEP) development    GOALS     Short term goals to be met by 12/7/2022  (4 weeks):  Pt will show good recall of HEP requiring no more than minimal verbal cuing for proper form and technique. MET  Pt will increase cervical AROM to mod restrictions without pain, in order to improve safety in ADLs ad driving. - MET  Pt will report pain decreased to intermittent for improved ability to perform ADLs and exercise interventions.  - MET  Pt will demonstrate decreased hypertonicity in the upper trap to no more than mild intensity. - Not Met  Pt will report symptoms have centralized to no farther than L CTJ indicating nerve healing and improving pain levels and muscle activation. - Not Met    Long term goals to be met by 1/4/2023  (8 weeks):  Pt will achieve cervical AROM to [facility] to improve safety when driving, comfort while reading, and sleep. Pt will improve NDI to </= 10 % disability, demonstrating improved overall function.   Pt will test negatively for cervical radiculopathy TIC, indicating appropriate mobility and healing of nerve tissues. Pt will return to previous level of function without complaint of neck pain. mobintent  Access Code: [email or web address]  URL: [email or web address]  Date: 11/09/2022  Prepared by: Akira Jaquez    Exercises  Cervical Extension AROM with Strap - 2 x daily - 2 sets - 10 reps - 5 hold  Seated Assisted Cervical Rotation with Towel - 2 x daily - 2 sets - 10 reps - 2 hold  Reverse Push Ups - 2 x daily - 2 sets - 15 reps - 2 hold

## 2022-12-16 ENCOUNTER — OFFICE VISIT (OUTPATIENT)
Dept: ORTHOPEDIC SURGERY | Age: 82
End: 2022-12-16

## 2022-12-16 DIAGNOSIS — M54.12 CERVICAL RADICULOPATHY: ICD-10-CM

## 2022-12-16 DIAGNOSIS — M53.82 IMPAIRED RANGE OF MOTION OF CERVICAL SPINE: ICD-10-CM

## 2022-12-16 DIAGNOSIS — Z78.9 IMPAIRED MOBILITY AND ADLS: ICD-10-CM

## 2022-12-16 DIAGNOSIS — M54.2 NECK PAIN: Primary | ICD-10-CM

## 2022-12-16 DIAGNOSIS — Z74.09 IMPAIRED MOBILITY AND ADLS: ICD-10-CM

## 2022-12-16 DIAGNOSIS — Z78.9 IMPAIRED MOTOR CONTROL: ICD-10-CM

## 2022-12-16 DIAGNOSIS — M48.02 FORAMINAL STENOSIS OF CERVICAL REGION: ICD-10-CM

## 2022-12-16 NOTE — PROGRESS NOTES
[address] [facility]  [address] [facility] [address]  Dept: [phone]      Physical Therapy Daily Note     Referring MD: RAFAEL Andrews*  Diagnosis:    Diagnosis Orders   1. Neck pain        2. Cervical radiculopathy        3. Impaired range of motion of cervical spine        4. Impaired mobility and ADLs        5. Impaired motor control        6. Foraminal stenosis of cervical region            Surgery: n/a    Therapy precautions:None  Co-morbidities affecting plan of care: hypothyroid, HTN    PERTINENT MEDICAL HISTORY     Past medical and surgical history:   Past Medical History:   Diagnosis Date    Hypercholesteremia     Hypercholesterolemia     Hypertension     controlled with med    Other ill-defined conditions(289.89)     high cholesterol    Thyroid disease       Past Surgical History:   Procedure Laterality Date    BREAST BIOPSY Right 2014    cyst surgically removed    BREAST SURGERY      cyst removed right breast - skin    CHOLECYSTECTOMY      5/10; had ERCP 8/10 due to retained stone/shpincterectomy by Dr. Lay Canales hyst - fibroids    LAP,CHOLECYSTECTOMY  May 2010     Medications: reviewed in chart   Allergies: No Known Allergies     SUBJECTIVE     Chief complaints/history of injury: Patient reports onset of pain in the spring across both shoulders; she notes that movement of the right should will cause sharp pains across her neck into the left shoulder, and she experiences episodic numbness into all digits of the left hand  Date symptoms began: 04/22  Fabiola Bynum of condition: Chronic (continuous duration > 3 months)  Primary cause of current episode: Unspecified  How did symptoms start: unknown onset, no specific HARVEY  Describe current symptoms: pain in L side CTJ with radiation to distal LUE    Received previous therapy?  Yes; Number of therapy visits in the last 90 days: 0    Pavoid aggravating movements    Patient Stated Goals: reduce pain, return to PLOF, avoid surgery    Initial Evaluation: 11/9/22  Last Progress Note: n/a  Total Visits: 12    Total Timed Codes:  min, Total Treatment Time:  min    SUBJECTIVE   Pt reports ANG yesterday. MD quite pleased about space available for injection and ability to localize medication in desired location. Per MD no restrictions other than no heat or lifting. Arm pain only occasionally now. Sleeping better. Continues with cervical stiffness. OBJECTIVE       Palpation/joint mobility: hypomobile CTJ, 1st Rib (L, mod), hypertonic UT, levaror scap and scalesns  Observation: moderate thoracic hyperkyphosis with hypomobility throughout: rounded shoulders with scapular elevation and abduction; frequently moves head and left UE due to pain with static sitting. Treatment provided today:  Manual therapy (15644) x 20 min utilizing techniques to improve joint and/or soft tissue mobility, ROM, and function as well as helping to decrease pain/spasms and swelling. **Patient educated on and provided verbal consent for grade 5 mobilizations  Palpation and assessment of soft tissue, muscles, and landmarks   STM  Occipitals  LS  scalenes  UT (B)  Thoracic mobilization grd II/III T1-4    Therapeutic exercise (12834) x 15 min to develop ROM, strength, endurance and flexibility of the upper quarter. Dynamic UQ warm up (unweighted)  High row  ER (0 abduction)  Horizontal abduction  PROM  GHJ (MWM)  Flexion  scaption  Row  Mid  med band (head) 1x2min  Horizontal abduction  light band (head) 1x2min  Shoulder extensionMed band  Alternating contralateral isometric, 1x2min  Scapular retraction  Repeated  ER    Patient Education on avoiding repetitive movement and increased blood flow to avoid flushing meds from injection sites.     ntermittent mechanical traction (87872) x 15 min to cervical spine to address spinal/nerve/disc compression, improve joint mobility, and/or soft tissue flexibility utilizing [employer] device model TXE-7, serial # [id number].  20 pounds pressure, 20 sec on/15 off    ASSESSMENT     Held cervical mobilizations due to recent ANG. UE pain decreasing. Continues with postural deviations, thoracic stiffness and scapular weakness. PLAN     Resume cervical mobilizations next visit. Cont mechanical traction to address UE radiculopathy as needed. Focus on thoracic mobility and scapular strength. PLAN OF CARE     Effective Dates: 11/9/2022 TO 2/7/2023 (90 days). Frequency/Duration: 2x/week for 60 Day(s)  Interventions may include but are not limited to: (02407) Therapeutic exercise to develop ROM, strength, endurance and flexibility  (21326) Therapeutic activities using dynamic activities to improve function  (58018) Manual therapy techniques to improve joint and/or soft tissue mobility, ROM, and function as well as helping to decrease pain/spasms and swelling  (75065) Mechanical traction to address spinal/nerve/disc compression, improve joint mobility, and/or soft tissue flexibility  Home exercise program (HEP) development    GOALS     Short term goals to be met by 12/7/2022  (4 weeks):  Pt will show good recall of HEP requiring no more than minimal verbal cuing for proper form and technique. MET  Pt will increase cervical AROM to mod restrictions without pain, in order to improve safety in ADLs ad driving. - MET  Pt will report pain decreased to intermittent for improved ability to perform ADLs and exercise interventions.  - MET  Pt will demonstrate decreased hypertonicity in the upper trap to no more than mild intensity. - Not Met  Pt will report symptoms have centralized to no farther than L CTJ indicating nerve healing and improving pain levels and muscle activation. - Not Met    Long term goals to be met by 1/4/2023  (8 weeks):  Pt will achieve cervical AROM to [facility] to improve safety when driving, comfort while reading, and sleep.   Pt will improve NDI to </= 10 % disability, demonstrating improved overall function. Pt will test negatively for cervical radiculopathy TIC, indicating appropriate mobility and healing of nerve tissues. Pt will return to previous level of function without complaint of neck pain. [employer]  Access Code: [email or web address]  URL: [email or web address]  Date: 11/09/2022  Prepared by: Layla Gusman    Exercises  Cervical Extension AROM with Strap - 2 x daily - 2 sets - 10 reps - 5 hold  Seated Assisted Cervical Rotation with Towel - 2 x daily - 2 sets - 10 reps - 2 hold  Reverse Push Ups - 2 x daily - 2 sets - 15 reps - 2 hold

## 2022-12-19 ENCOUNTER — OFFICE VISIT (OUTPATIENT)
Dept: ORTHOPEDIC SURGERY | Age: 82
End: 2022-12-19
Payer: MEDICARE

## 2022-12-19 DIAGNOSIS — Z74.09 IMPAIRED MOBILITY AND ADLS: ICD-10-CM

## 2022-12-19 DIAGNOSIS — M54.2 NECK PAIN: Primary | ICD-10-CM

## 2022-12-19 DIAGNOSIS — M53.82 IMPAIRED RANGE OF MOTION OF CERVICAL SPINE: ICD-10-CM

## 2022-12-19 DIAGNOSIS — Z78.9 IMPAIRED MOBILITY AND ADLS: ICD-10-CM

## 2022-12-19 DIAGNOSIS — Z78.9 IMPAIRED MOTOR CONTROL: ICD-10-CM

## 2022-12-19 PROCEDURE — 97110 THERAPEUTIC EXERCISES: CPT

## 2022-12-19 PROCEDURE — 97140 MANUAL THERAPY 1/> REGIONS: CPT

## 2022-12-19 NOTE — PROGRESS NOTES
[address] [facility]  [address] [facility] [address]  Dept: [phone]      Physical Therapy Progress Report     Referring MD: RAFAEL Soni*  Diagnosis:    Diagnosis Orders   1. Neck pain        2. Impaired range of motion of cervical spine        3. Impaired mobility and ADLs        4. Impaired motor control            Surgery: n/a    Therapy precautions:None  Co-morbidities affecting plan of care: hypothyroid, HTN    PERTINENT MEDICAL HISTORY     Past medical and surgical history:   Past Medical History:   Diagnosis Date    Hypercholesteremia     Hypercholesterolemia     Hypertension     controlled with med    Other ill-defined conditions(799.89)     high cholesterol    Thyroid disease       Past Surgical History:   Procedure Laterality Date    BREAST BIOPSY Right 2014    cyst surgically removed    BREAST SURGERY      cyst removed right breast - skin    CHOLECYSTECTOMY      5/10; had ERCP 8/10 due to retained stone/shpincterectomy by Dr. Billy burgos - fibroids    LAP,CHOLECYSTECTOMY  May 2010     Medications: reviewed in chart   Allergies: No Known Allergies     SUBJECTIVE     Chief complaints/history of injury: Patient reports onset of pain in the spring across both shoulders; she notes that movement of the right should will cause sharp pains across her neck into the left shoulder, and she experiences episodic numbness into all digits of the left hand  Date symptoms began: 04/22  [facility] of condition: Chronic (continuous duration > 3 months)  Primary cause of current episode: Unspecified  How did symptoms start: unknown onset, no specific HARVEY  Describe current symptoms: pain in L side CTJ with radiation to distal LUE    Received previous therapy?  Yes; Number of therapy visits in the last 90 days: 0    Pavoid aggravating movements    Patient Stated Goals: reduce pain, return to PLOF, avoid surgery    Initial Evaluation: 11/9/22  Last Progress Note: n/a  Total Visits: 13    Total Timed Codes: 45 min, Total Treatment Time: 45 min    SUBJECTIVE   Pt reports not really noticing any improvements since her ANG, but thinks she can turn her head more when driving. There continues to be left side neck tightness, but a primary complaint of pain around the L AC joint. Nature of condition: Chronic (continuous duration > 3 months)  Describe current symptoms: Mild tightness in the L side neck at CTJ with pain at L ACJ    Pain Assessment:  Pain location: L ACJ    Average Pain/symptom intensity (0-10 scale)  Last 24 hours: 2/10  Last week (1-7 days): 2/10  How often do you feel symptoms? Occasionally (26-50%)    How much have your symptoms interfered with daily activities? Moderately  How has your condition changed since receiving care at this facility? A little better  In general, would you say your current overall health is very good     Functional Outcome Measures: Neck Disability Index: 10/50= 20% functional deficit    OBJECTIVE     Hand/Side Dominance: right handed  Observation:   Posture: Forward head, Rounded shoulders, and Thoracic kyphosis  Swelling/Edema: none  Skin Integrity: normal                 Neuro screen: patient denies numbness, tingling, or radiating     AROM  Cervical Restriction Overpressure   Flexion no     Extension Max*     R side bend Mod     L side bend Mod     R rotation Min     L Rotation Min                 Shoulder WNL         Radiculopathy Cluster  Cervical +/- Description   Rotation - <60 degrees   ULTTa + (L)     Distraction + (L)     Spurling's -        Strength/MMT (0-5 Scale):     Shoulder Right Left Comment   Flexion 5 5    Abduction 5 5    Scaption 5 5    IR  5 5    ER 5 4+    Elbow      Flexion 5 5    Extension 5 5      Shoulder: Shoulder Impingement: Painful Arc: -  Infraspinatus: -  Malik-Tre: -  RTC Tendinopathy: Drop Arm: -  Empty Can: -    Palpation/joint mobility: hypomobile CTJ, 1st Rib (L, mod), hypertonic (mod) UT, levator scap and scalenes    Treatment provided today:  Manual therapy (05700) x 20 min utilizing techniques to improve joint and/or soft tissue mobility, ROM, and function as well as helping to decrease pain/spasms and swelling. **Patient educated on and provided verbal consent for grade 5 mobilizations  Palpation and assessment of soft tissue, muscles, and landmarks   Cervical mobilization  Traction, grade 2-4  1st rib mobilization (B) grade 2-3    STMOccipitals  LS  scalenes  UT (B)    Therapeutic exercise (44460) x 25 min to develop ROM, strength, endurance and flexibility of the upper quarter. ROM and MMT assessment  Thoracic mobilization  Longitudinal (half round)  Static  Dynamic  ER  Horizontal abduction  Shoulder extensionMed band, 1x fatigue    Patient Education on progress to date with continuing isolation of pain to the L shoulder; improved safety in driving      ASSESSMENT     Progress Report Period: 11/9 to 12/19/2022  As of 12/19/2022, Sourav Castelan has attended 13 PT sessions. Pt's attendance has been consistent with plan of care. Pt has progressed well with treatment. She has met 4/5 short term goals, has subjective reports of L side cervical stiffness instead of pain, clearance of RUE symptoms, and reduced incidence of LUE paresthesia, and has improved functional deficit to 20% per NDI. Objective findings revealed improving strength globally, with negative Spurling's and cervical rotation factors in cervical radiculopathy TIC, and negative testing for RTC pathology. Continued deficits include: Pain  ROM limitations  Motor control deficits  Impaired posture  Restricted recreational participation. Pt demonstrates mild or moderate objective and functional deficits without instability: sporadic symptoms with min to mod loss of ROM, strength, or ADLs.  Pt has not achieved max rehab potential and would benefit from continued skilled PT to address the above impairments and continue progress towards remaining therapy goals. PLAN     Reduce POC to 1x/week. Manual treatment, including cervical traction, for improved cervicothoracic mobility with increased focus on L GHJ. Focus on thoracic mobility and scapular strength. PLAN OF CARE     Effective Dates: 11/9/2022 TO 2/7/2023 (90 days). Frequency/Duration: 1x/week for 60 Day(s)  Interventions may include but are not limited to: (23087) Therapeutic exercise to develop ROM, strength, endurance and flexibility  (76569) Therapeutic activities using dynamic activities to improve function  (16271) Manual therapy techniques to improve joint and/or soft tissue mobility, ROM, and function as well as helping to decrease pain/spasms and swelling  (60508) Mechanical traction to address spinal/nerve/disc compression, improve joint mobility, and/or soft tissue flexibility  Home exercise program (HEP) development    GOALS     Short term goals to be met by 12/7/2022  (4 weeks):  Pt will show good recall of HEP requiring no more than minimal verbal cuing for proper form and technique. MET  Pt will increase cervical AROM to mod restrictions without pain, in order to improve safety in ADLs ad driving. - MET  Pt will report pain decreased to intermittent for improved ability to perform ADLs and exercise interventions.  - MET  Pt will demonstrate decreased hypertonicity in the upper trap to no more than mild intensity. - Goal Not Met 12/19/2022 - Continue  Pt will report symptoms have centralized to no farther than L CTJ indicating nerve healing and improving pain levels and muscle activation. - Goal Met 12/19/2022    Long term goals to be met by 1/4/2023  (8 weeks):  Pt will achieve cervical AROM to [facility] to improve safety when driving, comfort while reading, and sleep. Pt will improve NDI to </= 10 % disability, demonstrating improved overall function. Pt will test negatively for cervical radiculopathy TIC, indicating appropriate mobility and healing of nerve tissues.   Pt will return to previous level of function without complaint of neck pain. [employer]  Access Code: [email or web address]  URL: [email or web address]  Date: 11/09/2022  Prepared by: Lady Araya    Exercises  Cervical Extension AROM with Strap - 2 x daily - 2 sets - 10 reps - 5 hold  Seated Assisted Cervical Rotation with Towel - 2 x daily - 2 sets - 10 reps - 2 hold  Reverse Push Ups - 2 x daily - 2 sets - 15 reps - 2 hold

## 2022-12-28 ENCOUNTER — OFFICE VISIT (OUTPATIENT)
Dept: ORTHOPEDIC SURGERY | Age: 82
End: 2022-12-28
Payer: MEDICARE

## 2022-12-28 DIAGNOSIS — M53.82 IMPAIRED RANGE OF MOTION OF CERVICAL SPINE: ICD-10-CM

## 2022-12-28 DIAGNOSIS — Z78.9 IMPAIRED MOTOR CONTROL: ICD-10-CM

## 2022-12-28 DIAGNOSIS — M54.12 CERVICAL RADICULOPATHY: ICD-10-CM

## 2022-12-28 DIAGNOSIS — M54.2 NECK PAIN: Primary | ICD-10-CM

## 2022-12-28 DIAGNOSIS — Z78.9 IMPAIRED MOBILITY AND ADLS: ICD-10-CM

## 2022-12-28 DIAGNOSIS — Z74.09 IMPAIRED MOBILITY AND ADLS: ICD-10-CM

## 2022-12-28 PROCEDURE — 97140 MANUAL THERAPY 1/> REGIONS: CPT

## 2022-12-28 PROCEDURE — 97110 THERAPEUTIC EXERCISES: CPT

## 2022-12-28 NOTE — PROGRESS NOTES
111 [facility]  [address] [facility],[address] [address]  Dept: [phone]      Physical Therapy Daily Note     Referring MD: RAFAEL Manzo*  Diagnosis:    Diagnosis Orders   1. Neck pain        2. Impaired range of motion of cervical spine        3. Impaired mobility and ADLs        4. Impaired motor control        5. Cervical radiculopathy            Surgery: n/a    Therapy precautions:None  Co-morbidities affecting plan of care: hypothyroid, HTN    PERTINENT MEDICAL HISTORY     Past medical and surgical history:   Past Medical History:   Diagnosis Date    Hypercholesteremia     Hypercholesterolemia     Hypertension     controlled with med    Other ill-defined conditions(799.89)     high cholesterol    Thyroid disease       Past Surgical History:   Procedure Laterality Date    BREAST BIOPSY Right 2014    cyst surgically removed    BREAST SURGERY      cyst removed right breast - skin    CHOLECYSTECTOMY      5/10; had ERCP 8/10 due to retained stone/shpincterectomy by Dr. Jona Cunningham hyst - fibroids    LAP,CHOLECYSTECTOMY  May 2010     Medications: reviewed in chart   Allergies: No Known Allergies     SUBJECTIVE     Chief complaints/history of injury: Patient reports onset of pain in the spring across both shoulders; she notes that movement of the right should will cause sharp pains across her neck into the left shoulder, and she experiences episodic numbness into all digits of the left hand    PN: 12/19/22  Bassam Calderon of condition: Chronic (continuous duration > 3 months)  Describe current symptoms: Mild tightness in the L side neck at CTJ with pain at L ACJ    Pain Assessment:  Pain location: L ACJ    Average Pain/symptom intensity (0-10 scale)  Last 24 hours: 2/10  Last week (1-7 days): 2/10  How often do you feel symptoms? Occasionally (26-50%)    How much have your symptoms interfered with daily activities?  Moderately  How has your condition changed since receiving care at this facility? A little better  In general, would you say your current overall health is very good     Functional Outcome Measures: Neck Disability Index: 10/50= 20% functional deficit    Patient Stated Goals: reduce pain, return to PLOF, avoid surgery    Initial Evaluation: 11/9/22  Last Progress Note: n/a  Total Visits: 14    Total Timed Codes: 40 min, Total Treatment Time: 40 min    SUBJECTIVE     Pt reports feeling ok on arrival, but continued difficulty with loading in ADLs; she experienced a sharp pain in her L posterior shoulder when attempting to lift a full laundry basket this morning. She also notices increased incidence of numbness in the LUE when she sidebends her neck left. OBJECTIVE     Hand/Side Dominance: right handed  Observation:   Posture: Forward head, Rounded shoulders, and Thoracic kyphosis          AROM  Cervical Restriction Overpressure   Flexion no     Extension Max     R side bend Mod     L side bend Mod     R rotation Min     L Rotation Min                 Shoulder WNL         Palpation/joint mobility: hypomobile 1st Rib (L, mod), hypertonic (mod) UT, levator scap and scalenes, TTP L supraspinatus  L GHJ: mod restriction (posterior/inf)    Treatment provided today:  Manual therapy (68853) x 25 min utilizing techniques to improve joint and/or soft tissue mobility, ROM, and function as well as helping to decrease pain/spasms and swelling. **Patient educated on and provided verbal consent for grade 5 mobilizations  Palpation and assessment of soft tissue, muscles, and landmarks   Cervical mobilization  1st rib mobilization (B) grade 2-3  Traction, grade 2-4    STM/pin and stretchLS  scalenes  UT (L)    Therapeutic exercise (39721) x 15 min to develop ROM, strength, endurance and flexibility of the upper quarter.   Scapular retraction (half round) 2min each  Repeated  ER  Shoulder extensionMed band, 2x fatigue  Row  Mid  med band (head) 1x2min    Patient Education on varied presentation depending on daily load and arthritic changes in the cervical spine and shoulder      ASSESSMENT     Patient demonstrates:  Preservation of cervical AROM, without pain in all planes  Onset of paresthesia with extended supine posture, but clearance with repeated retraction and unloading of UT     She has improvements in:  Muscular tone globally s/p manual treatment  Mobility of the GHJ  Motor control un UQ motions     Continues with:  Deficits in cervical AROM  Indications of cervical radiculopathy and L RTC degeneration    PLAN     Continue therapy per POC. Manual treatment, including cervical traction, for improved cervicothoracic mobility with increased focus on L GHJ. Focus on thoracic mobility and scapular strength. PLAN OF CARE     Effective Dates: 11/9/2022 TO 2/7/2023 (90 days). Frequency/Duration: 1x/week for 60 Day(s)  Interventions may include but are not limited to: (91573) Therapeutic exercise to develop ROM, strength, endurance and flexibility  (31509) Therapeutic activities using dynamic activities to improve function  (42184) Manual therapy techniques to improve joint and/or soft tissue mobility, ROM, and function as well as helping to decrease pain/spasms and swelling  (50195) Mechanical traction to address spinal/nerve/disc compression, improve joint mobility, and/or soft tissue flexibility  Home exercise program (HEP) development    GOALS     Short term goals to be met by 12/7/2022  (4 weeks):  Pt will show good recall of HEP requiring no more than minimal verbal cuing for proper form and technique.  MET  Pt will increase cervical AROM to mod restrictions without pain, in order to improve safety in ADLs ad driving. - MET  Pt will report pain decreased to intermittent for improved ability to perform ADLs and exercise interventions.  - MET  Pt will demonstrate decreased hypertonicity in the upper trap to no more than mild intensity. - Goal Not Met 12/19/2022 - Continue  Pt will report symptoms have centralized to no farther than L CTJ indicating nerve healing and improving pain levels and muscle activation. - Goal Met 12/19/2022    Long term goals to be met by 1/4/2023  (8 weeks):  Pt will achieve cervical AROM to [facility] to improve safety when driving, comfort while reading, and sleep. Pt will improve NDI to </= 10 % disability, demonstrating improved overall function. Pt will test negatively for cervical radiculopathy TIC, indicating appropriate mobility and healing of nerve tissues. Pt will return to previous level of function without complaint of neck pain. [employer]  Access Code: [email or web address]  URL: [email or web address]  Date: 11/09/2022  Prepared by: Won Almendarez    Exercises  Cervical Extension AROM with Strap - 2 x daily - 2 sets - 10 reps - 5 hold  Seated Assisted Cervical Rotation with Towel - 2 x daily - 2 sets - 10 reps - 2 hold  Reverse Push Ups - 2 x daily - 2 sets - 15 reps - 2 hold

## 2023-01-04 ENCOUNTER — OFFICE VISIT (OUTPATIENT)
Dept: ORTHOPEDIC SURGERY | Age: 83
End: 2023-01-04
Payer: MEDICARE

## 2023-01-04 DIAGNOSIS — Z78.9 IMPAIRED MOTOR CONTROL: ICD-10-CM

## 2023-01-04 DIAGNOSIS — M53.82 IMPAIRED RANGE OF MOTION OF CERVICAL SPINE: ICD-10-CM

## 2023-01-04 DIAGNOSIS — M54.2 NECK PAIN: Primary | ICD-10-CM

## 2023-01-04 DIAGNOSIS — M54.12 CERVICAL RADICULOPATHY: ICD-10-CM

## 2023-01-04 DIAGNOSIS — Z78.9 IMPAIRED MOBILITY AND ADLS: ICD-10-CM

## 2023-01-04 DIAGNOSIS — Z74.09 IMPAIRED MOBILITY AND ADLS: ICD-10-CM

## 2023-01-04 PROCEDURE — 97140 MANUAL THERAPY 1/> REGIONS: CPT

## 2023-01-04 PROCEDURE — 97110 THERAPEUTIC EXERCISES: CPT

## 2023-01-04 NOTE — PROGRESS NOTES
111 [facility]  [address] [facility] [address]  Dept: [phone]      Physical Therapy Daily Note     Referring MD: Shawna April, APRN*  Diagnosis:    Diagnosis Orders   1. Neck pain        2. Impaired range of motion of cervical spine        3. Impaired mobility and ADLs        4. Impaired motor control        5. Cervical radiculopathy            Surgery: n/a    Therapy precautions:None  Co-morbidities affecting plan of care: hypothyroid, HTN    PERTINENT MEDICAL HISTORY     Past medical and surgical history:   Past Medical History:   Diagnosis Date    Hypercholesteremia     Hypercholesterolemia     Hypertension     controlled with med    Other ill-defined conditions(799.89)     high cholesterol    Thyroid disease       Past Surgical History:   Procedure Laterality Date    BREAST BIOPSY Right 2014    cyst surgically removed    BREAST SURGERY      cyst removed right breast - skin    CHOLECYSTECTOMY      5/10; had ERCP 8/10 due to retained stone/shpincterectomy by Dr. Gibran parsonsst - fibroids    LAP,CHOLECYSTECTOMY  May 2010     Medications: reviewed in chart   Allergies: No Known Allergies     SUBJECTIVE     Chief complaints/history of injury: Patient reports onset of pain in the spring across both shoulders; she notes that movement of the right should will cause sharp pains across her neck into the left shoulder, and she experiences episodic numbness into all digits of the left hand    PN: 12/19/22  Kiersten Needle of condition: Chronic (continuous duration > 3 months)  Describe current symptoms: Mild tightness in the L side neck at CTJ with pain at L ACJ    Pain Assessment:  Pain location: L ACJ    Average Pain/symptom intensity (0-10 scale)  Last 24 hours: 2/10  Last week (1-7 days): 2/10  How often do you feel symptoms? Occasionally (26-50%)    How much have your symptoms interfered with daily activities?  Moderately  How has your condition changed since receiving care at this facility? A little better  In general, would you say your current overall health is very good     Functional Outcome Measures: Neck Disability Index: 10/50= 20% functional deficit    Patient Stated Goals: reduce pain, return to PLOF, avoid surgery    Initial Evaluation: 11/9/22  Last Progress Note: 12/19/22  Total Visits: 15 (1 in 2023)    Total Timed Codes: 40 min, Total Treatment Time: 40 min    SUBJECTIVE     Pt reports feeling 'normal' on arrival; there is an ache at the base of her neck on the L side, just superior to the CTJ, with mild radiation to the posterior shoulder, but staying above the ACJ. She notes increased difficulty with cervical motion today, that everything feels stiff. The biggest activity change was that she took down her [employer] tree yesterday. She has also been having increased incidence of a 'sleepy' feeling throughout the LUE, without specific motions, but just sitting and watching TV. She continues to exercise regularly, noting her symptoms tend to alleviate with increased targeted activity. OBJECTIVE     Hand/Side Dominance: right handed  Observation:   Posture: Forward head, Rounded shoulders, and Thoracic kyphosis          AROM  Cervical Restriction Overpressure   Flexion no     Extension Max*     R side bend Mod     L side bend Mod     R rotation Mod*     L Rotation Mod*                 Shoulder WNL *no elevation bilaterally       Palpation/joint mobility: hypomobile CTJ, hypertonic (mod) UT  L GHJ: mild restriction (posterior)    Treatment provided today:  Manual therapy (65708) x 25 min utilizing techniques to improve joint and/or soft tissue mobility, ROM, and function as well as helping to decrease pain/spasms and swelling.   **Patient educated on and provided verbal consent for grade 5 mobilizations  Palpation and assessment of soft tissue, muscles, and landmarks   Cervical mobilization  CTJ HVLAT (supine) grade 5  Traction, grade 2-4      Therapeutic exercise (25130) x 15 min to develop ROM, strength, endurance and flexibility of the upper quarter. Cervical mobilization  Snag (B) 60s each  CTJ extension (towel assist) 2min  Scapular retraction (half round) 2min each  Repeated* onset of LUE paresthesia  Shoulder extensionlight band, 2x2min  Row  Mid  light band (head) 1x2min* onset of anterior GHJ pain    Patient Education on varied presentation depending on daily load and arthritic changes in the cervical spine and shoulder, good days vs bad days and non-linear nature of therapy. ASSESSMENT     Patient demonstrates:  loss of cervical AROM in rotation and extension with pain  Difficulty initiating scapular retraction, with upper trap engagement leading to onset of LUE paresthesia     She has improvements in:  CTJ mobility and paresthesia s/p manual treatment  Mobility of the GHJ     Continues with:  Deficits in cervical AROM, with reporting generalized UQ stiffness and discomfort  Indications of cervical radiculopathy, but clearance of RTC symptoms    PLAN     Continue therapy per POC. Manual treatment, including cervical traction, for improved cervicothoracic mobility. Focus on thoracic mobility and scapular strength to unload upper traps and alleviate paresthetic symptoms. PLAN OF CARE     Effective Dates: 11/9/2022 TO 2/7/2023 (90 days).     Frequency/Duration: 1x/week for 60 Day(s)  Interventions may include but are not limited to: (60202) Therapeutic exercise to develop ROM, strength, endurance and flexibility  (76774) Therapeutic activities using dynamic activities to improve function  (48749) Manual therapy techniques to improve joint and/or soft tissue mobility, ROM, and function as well as helping to decrease pain/spasms and swelling  (20898) Mechanical traction to address spinal/nerve/disc compression, improve joint mobility, and/or soft tissue flexibility  Home exercise program (HEP) development    GOALS     Short term goals to be met by 12/7/2022  (4 weeks):  Pt will show good recall of HEP requiring no more than minimal verbal cuing for proper form and technique. MET  Pt will increase cervical AROM to mod restrictions without pain, in order to improve safety in ADLs ad driving. - MET  Pt will report pain decreased to intermittent for improved ability to perform ADLs and exercise interventions.  - MET  Pt will demonstrate decreased hypertonicity in the upper trap to no more than mild intensity. - Goal Not Met 12/19/2022 - Continue  Pt will report symptoms have centralized to no farther than L CTJ indicating nerve healing and improving pain levels and muscle activation. - Goal Met 12/19/2022    Long term goals to be met by 1/4/2023  (8 weeks):  Pt will achieve cervical AROM to [employer] [facility] [employer] to improve safety when driving, comfort while reading, and sleep. Pt will improve NDI to </= 10 % disability, demonstrating improved overall function. Pt will test negatively for cervical radiculopathy TIC, indicating appropriate mobility and healing of nerve tissues. Pt will return to previous level of function without complaint of neck pain. [employer]"  Access Code: [email or web address]  URL: [email or web address]  Date: 11/09/2022  Prepared by: Courtney Valero    Exercises  Cervical Extension AROM with Strap - 2 x daily - 2 sets - 10 reps - 5 hold  Seated Assisted Cervical Rotation with Towel - 2 x daily - 2 sets - 10 reps - 2 hold  Reverse Push Ups - 2 x daily - 2 sets - 15 reps - 2 hold

## 2023-01-11 ENCOUNTER — OFFICE VISIT (OUTPATIENT)
Dept: ORTHOPEDIC SURGERY | Age: 83
End: 2023-01-11
Payer: MEDICARE

## 2023-01-11 DIAGNOSIS — Z78.9 IMPAIRED MOTOR CONTROL: ICD-10-CM

## 2023-01-11 DIAGNOSIS — M53.82 IMPAIRED RANGE OF MOTION OF CERVICAL SPINE: ICD-10-CM

## 2023-01-11 DIAGNOSIS — Z74.09 IMPAIRED MOBILITY AND ADLS: ICD-10-CM

## 2023-01-11 DIAGNOSIS — M48.02 FORAMINAL STENOSIS OF CERVICAL REGION: Primary | ICD-10-CM

## 2023-01-11 DIAGNOSIS — M54.12 CERVICAL RADICULOPATHY: ICD-10-CM

## 2023-01-11 DIAGNOSIS — M54.2 NECK PAIN: Primary | ICD-10-CM

## 2023-01-11 DIAGNOSIS — Z78.9 IMPAIRED MOBILITY AND ADLS: ICD-10-CM

## 2023-01-11 PROCEDURE — 99214 OFFICE O/P EST MOD 30 MIN: CPT | Performed by: NURSE PRACTITIONER

## 2023-01-11 PROCEDURE — G8484 FLU IMMUNIZE NO ADMIN: HCPCS | Performed by: NURSE PRACTITIONER

## 2023-01-11 PROCEDURE — 1090F PRES/ABSN URINE INCON ASSESS: CPT | Performed by: NURSE PRACTITIONER

## 2023-01-11 PROCEDURE — G8399 PT W/DXA RESULTS DOCUMENT: HCPCS | Performed by: NURSE PRACTITIONER

## 2023-01-11 PROCEDURE — 97140 MANUAL THERAPY 1/> REGIONS: CPT

## 2023-01-11 PROCEDURE — 1036F TOBACCO NON-USER: CPT | Performed by: NURSE PRACTITIONER

## 2023-01-11 PROCEDURE — G8428 CUR MEDS NOT DOCUMENT: HCPCS | Performed by: NURSE PRACTITIONER

## 2023-01-11 PROCEDURE — G8417 CALC BMI ABV UP PARAM F/U: HCPCS | Performed by: NURSE PRACTITIONER

## 2023-01-11 PROCEDURE — 97110 THERAPEUTIC EXERCISES: CPT

## 2023-01-11 PROCEDURE — 1123F ACP DISCUSS/DSCN MKR DOCD: CPT | Performed by: NURSE PRACTITIONER

## 2023-01-11 RX ORDER — GABAPENTIN 100 MG/1
100-300 CAPSULE ORAL NIGHTLY
Qty: 90 CAPSULE | Refills: 0 | Status: SHIPPED | OUTPATIENT
Start: 2023-01-11 | End: 2023-02-10

## 2023-01-11 NOTE — PROGRESS NOTES
[address] [facility]  [address] [facility],[address] [address]  Dept: [phone]      Physical Therapy Daily Note     Referring MD: RAFAEL Hamilton*  Diagnosis:    Diagnosis Orders   1. Neck pain        2. Impaired range of motion of cervical spine        3. Impaired mobility and ADLs        4. Impaired motor control        5. Cervical radiculopathy          Surgery: n/a    Therapy precautions:None  Co-morbidities affecting plan of care: hypothyroid, HTN    PERTINENT MEDICAL HISTORY     Past medical and surgical history:   Past Medical History:   Diagnosis Date    Hypercholesteremia     Hypercholesterolemia     Hypertension     controlled with med    Other ill-defined conditions(799.89)     high cholesterol    Thyroid disease       Past Surgical History:   Procedure Laterality Date    BREAST BIOPSY Right 2014    cyst surgically removed    BREAST SURGERY      cyst removed right breast - skin    CHOLECYSTECTOMY      5/10; had ERCP 8/10 due to retained stone/shpincterectomy by Dr. Ivon burgos - fibroids    LAP,CHOLECYSTECTOMY  May 2010     Medications: reviewed in chart   Allergies: No Known Allergies     SUBJECTIVE     Chief complaints/history of injury: Patient reports onset of pain in the spring across both shoulders; she notes that movement of the right should will cause sharp pains across her neck into the left shoulder, and she experiences episodic numbness into all digits of the left hand    PN: 12/19/22  Alexa  of condition: Chronic (continuous duration > 3 months)  Describe current symptoms: Mild tightness in the L side neck at CTJ with pain at L ACJ    Pain Assessment:  Pain location: L ACJ    Average Pain/symptom intensity (0-10 scale)  Last 24 hours: 2/10  Last week (1-7 days): 2/10  How often do you feel symptoms? Occasionally (26-50%)    How much have your symptoms interfered with daily activities?  Moderately  How has your condition changed since receiving care at this facility? A little better  In general, would you say your current overall health is very good     Functional Outcome Measures: Neck Disability Index: 10/50= 20% functional deficit    Patient Stated Goals: reduce pain, return to PLOF, avoid surgery    Initial Evaluation: 11/9/22  Last Progress Note: 12/19/22  Total Visits: 2    Total Timed Codes: 40 min, Total Treatment Time: 40 min    SUBJECTIVE     Pt reports feeling 'pretty good' on arrival; she notes there is no pain in her shoulder or neck, which surprises her, as she has been doing more around the house and been engaging in longer duration ambulatory activities. She still has symptom onset after walking around a store for >1 hour, or greater than 20min on a treadmill. Otherwise, she has no complaints and is happy that she has been relatively symptom free for the last week. She also went bowling, completing 5 frames, but says her technique was 'completely off' without much hip flexion or lumbar sidebending. OBJECTIVE     Hand/Side Dominance: right handed  Observation:   Posture: Forward head, Rounded shoulders, and Thoracic kyphosis          AROM  Cervical Restriction Overpressure   Flexion no     Extension mod     R side bend Mod     L side bend Mod     R rotation Min     L Rotation Mod                 Shoulder WNL *no elevation bilaterally       Palpation/joint mobility: hypomobile L5-CTJ, hypertonic (mild) UT    Treatment provided today:  Manual therapy (34610) x 15 min utilizing techniques to improve joint and/or soft tissue mobility, ROM, and function as well as helping to decrease pain/spasms and swelling.   **Patient educated on and provided verbal consent for grade 5 mobilizations  Palpation and assessment of soft tissue, muscles, and landmarks   Cervical mobilization  Lateral glides (R to L) C4-7 grd 2-4  Traction, grade 2-4  STM/pin and stretch  LS (L)  Scalenes (L)    Therapeutic exercise (61783) x 25 min to develop ROM, strength, endurance and flexibility of the upper quarter. Cervical mobilization  CTJ extension (towel assist) 2min  Row  Mid  light band (head) 1x2min  Shoulder extension  light band (alternating isometric) 1x2min  Isometrics (red ball, 5s hold) 60s each  ER  IR  Flexion  Circles (CW/CCW)    Patient Education on varied presentation depending on daily load and arthritic changes in the cervical spine and shoulder, progression with continued increase in ADL and activity levels      ASSESSMENT     Patient demonstrates: Mod restriction in cervical extension, rotation, and sidebending, without pain  Appropriate scapular engagement in isolation and isometric activities, but continued preference for trunk extension performing posterior trunk activities     She has improvements in:  Lower trunk mobility and paresthesia s/p treatment  RTC engagement  Self management of symptoms     Continues with:  Deficits in cervical AROM, with reports of episodic posterior shoulder pain  Indications of cervical radiculopathy, but continued clearance of RTC symptoms    PLAN     Continue therapy per POC. Manual treatment, including cervical traction, for improved cervicothoracic mobility. Focus on thoracic mobility and scapular strength to unload upper traps and alleviate paresthetic symptoms. PLAN OF CARE     Effective Dates: 11/9/2022 TO 2/7/2023 (90 days).     Frequency/Duration: 1x/week for 60 Day(s)  Interventions may include but are not limited to: (36558) Therapeutic exercise to develop ROM, strength, endurance and flexibility  (88104) Therapeutic activities using dynamic activities to improve function  (53987) Manual therapy techniques to improve joint and/or soft tissue mobility, ROM, and function as well as helping to decrease pain/spasms and swelling  (64913) Mechanical traction to address spinal/nerve/disc compression, improve joint mobility, and/or soft tissue flexibility  Home exercise program (HEP) development    GOALS     Short term goals to be met by 12/7/2022  (4 weeks):  Pt will show good recall of HEP requiring no more than minimal verbal cuing for proper form and technique. MET  Pt will increase cervical AROM to mod restrictions without pain, in order to improve safety in ADLs ad driving. - MET  Pt will report pain decreased to intermittent for improved ability to perform ADLs and exercise interventions.  - MET  Pt will demonstrate decreased hypertonicity in the upper trap to no more than mild intensity. Goal Met 1/11/2023  Pt will report symptoms have centralized to no farther than L CTJ indicating nerve healing and improving pain levels and muscle activation. - Goal Met 12/19/2022    Long term goals to be met by 1/4/2023  (8 weeks):  Pt will achieve cervical AROM to [facility] to improve safety when driving, comfort while reading, and sleep. Goal Not Met 1/11/2023 - Continue  Pt will improve NDI to </= 10 % disability, demonstrating improved overall function. Goal deferred 1/11/2023 - Continue  Pt will test negatively for cervical radiculopathy TIC, indicating appropriate mobility and healing of nerve tissues. Goal deferred 1/11/2023 - Continue  Pt will return to previous level of function without complaint of neck pain. Goal Not Met 1/11/2023 - Continue    [employer]  Access Code: [email or web address]  URL: [email or web address]  Date: 11/09/2022  Prepared by: Jose Feldman    Exercises  Cervical Extension AROM with Strap - 2 x daily - 2 sets - 10 reps - 5 hold  Seated Assisted Cervical Rotation with Towel - 2 x daily - 2 sets - 10 reps - 2 hold  Reverse Push Ups - 2 x daily - 2 sets - 15 reps - 2 hold

## 2023-01-11 NOTE — PROGRESS NOTES
Name: Daiana Fierro  YOB: 1940  Gender: female  MRN: [id number]    CC: Low up neck and left shoulder pain    HPI: This is a [de-identified]y.o. year old female who has complaints of pain in the left neck and radiating to the anterior shoulder and down and tingling into her arm. She had an MRI that revealed severe left C4-5 foraminal stenosis. Patient was referred to pain management and underwent 2 epidural injections. She does not feel that they significantly helped her. She still having the arm numbness and left-sided neck pain. She finds physical therapy more beneficial.  She does take naproxen but finds that it makes her extremely drowsy. She is back today to discuss whether she would like to proceed with surgery. Thus far, the patient has tried NSAIDS, spine injections , and physical therapy 3 months. AMB PAIN ASSESSMENT 9/28/2022   Location of Pain Shoulder   Location Modifiers Left   Severity of Pain 5   Frequency of Pain Intermittent   Limiting Behavior Yes   Result of Injury No   Work-Related Injury No   Are there other pain locations you wish to document? No        No Known Allergies    Current Outpatient Medications:     gabapentin (NEURONTIN) 100 MG capsule, Take 1-3 capsules by mouth nightly for 30 days. , Disp: 90 capsule, Rfl: 0    lovastatin (MEVACOR) 20 MG tablet, Take 1 tablet by mouth nightly, Disp: 90 tablet, Rfl: 3    levothyroxine (SYNTHROID) 75 MCG tablet, Take 1 tablet by mouth every morning (before breakfast), Disp: 90 tablet, Rfl: 3    hydroCHLOROthiazide (HYDRODIURIL) 25 MG tablet, Take 0.5 tablets by mouth daily, Disp: 90 tablet, Rfl: 3  Past Medical History:   Diagnosis Date    Hypercholesteremia     Hypercholesterolemia     Hypertension     controlled with med    Other ill-defined conditions(799.89)     high cholesterol    Thyroid disease      Tobacco:  reports that she quit smoking about 42 years ago. Her smoking use included cigarettes.  She has never used smokeless tobacco.  Alcohol:   Social History     Substance and Sexual Activity   Alcohol Use Yes    Alcohol/week: 0.0 standard drinks        Radiographic Studies:       MRI Results (most recent): MRI Result (most recent):  MRI CERVICAL SPINE WO CONTRAST 09/30/2022    Narrative  STUDY: MRI CERVICAL SPINE WO CONTRAST [id number]    STUDY DATE: 9/30/2022 9:45 AM    HISTORY: Radiculopathy, cervical region; Cervicalgia    COMPARISON:  None available. TECHNIQUE: Multi-sequence, multi-planar MR images were obtained of the cervical  spine without the administration of intravenous contrast.    CONTRAST: None. FINDINGS:  VISUALIZED BRAIN: Within normal limits. SPINAL CORD: Normal in signal and morphology. MARROW: Vertebral body stature maintained. No findings of acute fracture. No  diffuse marrow signal abnormality. No bone marrow edema like signal.  ALIGNMENT: Within normal limits. SOFT TISSUES: Preserved vascular flow voids. No focal thyroid nodule. No  cervical adenopathy. Visualized lung apices are clear. DISCS:Mild disc height loss at C4-C5 and moderate disc height loss at C5-C6. Diffuse disc desiccation. DEGENERATIVE:  C2-C3: No spinal canal narrowing. Right greater than left uncovertebral joint  and facet joint arthropathy contributes to mild right neural foraminal  narrowing. No significant left neural foraminal narrowing. C3-C4: No canal or foraminal narrowing. C4-C5: Posterior endplate hyperostosis with intervening disc bulge, with  superimposed left subarticular disc protrusion. No spinal canal narrowing. Bilateral uncovertebral joint and facet joint arthropathy. Severe left and mild  right neural foraminal narrowing. C5-C6: Posterior endplate hyperostosis with intervening disc bulge. Effacement  of the ventral thecal sac without significant spinal canal narrowing. Mild  bilateral uncovertebral joint and facet joint arthropathy without significant  neural foraminal narrowing.   C6-C7: Posterior endplate hyperostosis with intervening disc bulge, eccentric to  the left. Effacement of the ventral thecal sac without significant spinal canal  narrowing. Mild bilateral uncovertebral joint and facet joint arthropathy. No  neural foraminal narrowing. C7-T1: No canal or foraminal narrowing. Impression  Multilevel cervical spondylosis, most pronounced at the C4-C5 level, where there  is a left subarticular disc protrusion contributing to severe left neural  foraminal narrowing. Assessment/Plan:        ICD-10-CM    1. Foraminal stenosis of cervical region  M48.02       2. Cervical radiculopathy  M54.12          Discussed with patient that she is a candidate for a C4-5 ACDF. She really does not know she wants to proceed with surgery. She does not feels bad enough to warrant that quite yet. We discussed neuromodulator medications. I did express concern that if naproxen makes her drowsy I am a little concerned that gabapentin is going to be significant side effects for her. But we discussed just taking 100 mg at night. She wants to trial this. She is going to continue physical therapy. Today we discussed ACDF surgery and briefly discussed the recovery. She will call back if she would like to pursue surgical intervention.    - Gabapentin. The patient was prescribed an initial regimen of gabapentin. The medication nay need to be titrated up to a therapeutic level. Side effects were discussed including dizziness, ataxia, drowsiness, and nystagmus, or blurred vision.   -Future surgery: If the patient fails the conservative options listed above, I believe they may be a candidate for a C4-5 ACDF      Orders Placed This Encounter   Medications    gabapentin (NEURONTIN) 100 MG capsule     Sig: Take 1-3 capsules by mouth nightly for 30 days. Dispense:  90 capsule     Refill:  0        No orders of the defined types were placed in this encounter.        4 This is a chronic illness/condition with exacerbation and progression    Return if symptoms worsen or fail to improve. RAFAEL Adam - CNP  01/11/23      Elements of this note were created using speech recognition software. As such, errors of speech recognition may be present.

## 2023-01-11 NOTE — PATIENT INSTRUCTIONS
Anterior Cervical Discectomy and Fusion    How is this procedure done? This procedure is done through a cosmetic incision in the front of the neck and involves removing the cushion between the vertebrae along with any bone spurs that are pinching the spinal cord or nerves. The space where the disc was removed is then filled with a spacer cage and bone graft. This spacer will prevent the vertebrae from collapsing and rubbing together. A plate and screws is then used to secure the cage in place. How can this help me? This procedure is intended to relieve the pressure from your spinal cord and nerves which is typically the source of the pain and numbness you may feel in your neck or arms. What are the success rates? This procedure is typically 95% effective in alleviating your neck and arm symptoms within 3 months of your surgery. However, the longer your symptoms existed prior to the surgery, the longer it may take for them to resolve after the operation. In rare circumstances patients with severe weakness and numbness may not have much improvement of their symptoms. In this case, the surgery is done to prevent further deterioration. What are the risks? The most common risk is failure of the bone graft to heal (<5%). Other less common risks include infection, blood loss, nerve injury, spinal cord injury, throat or wind pipe injury, reaction to anesthesia, leakage of spinal fluid, failure of plate/screws, failure of surgery to relieve your symptoms, persistent pain in the neck, blood clots, and very rarely, death. Also about 10-20% of patients will develop disc degeneration over the following 10 years at levels above or below the operation which may require extension of the fusion. How long will I be hospitalized? Most patients having a 1 or 2 level ACDF are sent home the same day.  However, if you have a drain placed for bleeding, are having difficulty breathing or unstable blood pressure, you may need to stay overnight. What do I expect after the operation? You will have a sore throat, some hoarseness, and swallowing discomfort for the first week. What restrictions will I have? For the first 2-4 weeks you should:  Avoid driving  Limit car rides     Avoid lifting more than 10 lbs  Avoid reaching over your head  Avoid submersing the wounds in water    Will I need therapy? You should walk as much as you can for exercise. Usually formal physical therapy or rehabilitation is not needed after this procedure. Remember to navigate stairs carefully. How do I care for my wound? Three days after the operation you may remove the outer bandages from the neck wound. The wound is sealed with dermabond glue. If the wound is dry, it is ok to take a shower. If the wound is still draining, cover it with clean dressings until there is no further drainage. Do I need a neck brace? Many neck surgeries will not require a neck brace. Often, the internal plate and screws are sufficient for stabilization. Other times, your surgeon may prescribe a neck brace for you to supplement the internal fixation or simply have you wear one for comfort.      [facility]    MD Leonard Tejeda MD Amy Delvin Monarch, PA-C Clydell President, JAE    Main Office  [address] [facility], [facility] [place] [address] [address] [place] [address]       For Appointments at either location, please call [phone]

## 2023-01-19 ENCOUNTER — OFFICE VISIT (OUTPATIENT)
Age: 83
End: 2023-01-19
Payer: MEDICARE

## 2023-01-19 DIAGNOSIS — M54.2 NECK PAIN: ICD-10-CM

## 2023-01-19 DIAGNOSIS — Z74.09 IMPAIRED MOBILITY AND ADLS: ICD-10-CM

## 2023-01-19 DIAGNOSIS — Z78.9 IMPAIRED MOTOR CONTROL: ICD-10-CM

## 2023-01-19 DIAGNOSIS — M53.82 IMPAIRED RANGE OF MOTION OF CERVICAL SPINE: ICD-10-CM

## 2023-01-19 DIAGNOSIS — Z78.9 IMPAIRED MOBILITY AND ADLS: ICD-10-CM

## 2023-01-19 DIAGNOSIS — M54.12 CERVICAL RADICULOPATHY: Primary | ICD-10-CM

## 2023-01-19 PROCEDURE — 97530 THERAPEUTIC ACTIVITIES: CPT

## 2023-01-19 PROCEDURE — 97110 THERAPEUTIC EXERCISES: CPT

## 2023-01-19 PROCEDURE — 97140 MANUAL THERAPY 1/> REGIONS: CPT

## 2023-01-19 NOTE — PROGRESS NOTES
[address] [facility]  [address] [facility]  [address] [address]  Dept: [phone]      Physical Therapy Daily Note     Referring MD: RAFAEL Chavez*  Diagnosis:    Diagnosis Orders   1. Cervical radiculopathy        2. Neck pain        3. Impaired range of motion of cervical spine        4. Impaired mobility and ADLs        5. Impaired motor control          Surgery: n/a    Therapy precautions:None  Co-morbidities affecting plan of care: hypothyroid, HTN    PERTINENT MEDICAL HISTORY     Past medical and surgical history:   Past Medical History:   Diagnosis Date    Hypercholesteremia     Hypercholesterolemia     Hypertension     controlled with med    Other ill-defined conditions(799.89)     high cholesterol    Thyroid disease       Past Surgical History:   Procedure Laterality Date    BREAST BIOPSY Right 2014    cyst surgically removed    BREAST SURGERY      cyst removed right breast - skin    CHOLECYSTECTOMY      5/10; had ERCP 8/10 due to retained stone/shpincterectomy by Dr. Nj Pritchett hyst - fibroids    LAP,CHOLECYSTECTOMY  May 2010     Medications: reviewed in chart   Allergies: No Known Allergies     SUBJECTIVE     Chief complaints/history of injury: Patient reports onset of pain in the spring across both shoulders; she notes that movement of the right should will cause sharp pains across her neck into the left shoulder, and she experiences episodic numbness into all digits of the left hand    PN: 12/19/22  Kimo Rose of condition: Chronic (continuous duration > 3 months)  Describe current symptoms: Mild tightness in the L side neck at CTJ with pain at L ACJ    Pain Assessment:  Pain location: L ACJ    Average Pain/symptom intensity (0-10 scale)  Last 24 hours: 2/10  Last week (1-7 days): 2/10  How often do you feel symptoms? Occasionally (26-50%)    How much have your symptoms interfered with daily activities?  Moderately  How has your condition changed since receiving care at this facility? A little better  In general, would you say your current overall health is very good     Functional Outcome Measures: Neck Disability Index: 10/50= 20% functional deficit    Patient Stated Goals: reduce pain, return to PLOF, avoid surgery    Initial Evaluation: 11/9/22  Last Progress Note: 12/19/22  Total Visits: 3    Total Timed Codes: 40 min, Total Treatment Time: 40 min    SUBJECTIVE     Pt reports feeling ok on arrival; she has not been as active recreationally, and notes continued frustration with symptom onset [place] [facility] Otherwise she has not been experiencing paresthesia, and only mild soreness episodically from the posterior shoulder up the L side neck. She has not been bowling since previous treatment. She was evaluated by the spine team, with consideration of C4/5 ACDF. She continues to state she does not want to have surgery if able. OBJECTIVE     Hand/Side Dominance: right handed  Observation:   Posture: Forward head, Rounded shoulders, and Thoracic kyphosis          AROM  Cervical Restriction Overpressure   Flexion no     Extension mod     R side bend Mod     L side bend Mod     R rotation Min     L Rotation Mod*                        Palpation/joint mobility: hypomobile L5-CTJ 1st Rib (L, mod), hypertonic (mod) scalenes (L)    Treatment provided today:  Manual therapy (23899) x 15 min utilizing techniques to improve joint and/or soft tissue mobility, ROM, and function as well as helping to decrease pain/spasms and swelling. **Patient educated on and provided verbal consent for grade 5 mobilizations  Palpation and assessment of soft tissue, muscles, and landmarks   STM/pin and stretch  Scalenes (L)  Cervical [id number] rib mobilization (B) grade 2-4  Lower cervical HVLAT (B, seated) grade 5    Therapeutic exercise (33705) x 15 min to develop ROM, strength, endurance and flexibility of the upper quarter.   Row  Mid  TRX (long) 2x60s  light band (head) 1x2min  Horizontal abduction  TRX long, 2x60s  Therapeutic activities (08834) x 15 min using dynamic activities to improve function  Plank  TRX long  Carry  Keene's (3lbs) 100m    Patient Education on use of manipulations and expected response. varied presentation depending on daily load and arthritic changes in the cervical spine and shoulder, progression of function with continued increase in ADL and activity levels      ASSESSMENT     Patient demonstrates:  Restricted cervical AROM and mobility as detailed above. Appropriate scapular engagement in isolation and isometric activities, but continued preference for trunk extension performing posterior activities     She has improvements in:  Cervical AROM s/p manual treatment  Scapular stabilization, with advancement of UQ activities  Paresthesia onset in ambulation with increased scapular engagement     Continues with:  Deficits in cervical AROM  Indications of cervical radiculopathy    PLAN     Continue therapy per POC. Manual treatment, including cervical traction, for improved cervicothoracic mobility. Focus on thoracic mobility and scapular strength to unload upper traps and alleviate paresthetic symptoms. Complete progress assessment at next visit. PLAN OF CARE     Effective Dates: 11/9/2022 TO 2/7/2023 (90 days).     Frequency/Duration: 1x/week for 60 Day(s)  Interventions may include but are not limited to: (34896) Therapeutic exercise to develop ROM, strength, endurance and flexibility  (65246) Therapeutic activities using dynamic activities to improve function  (55034) Manual therapy techniques to improve joint and/or soft tissue mobility, ROM, and function as well as helping to decrease pain/spasms and swelling  (04201) Mechanical traction to address spinal/nerve/disc compression, improve joint mobility, and/or soft tissue flexibility  Home exercise program (HEP) development    GOALS     Short term goals to be met by 12/7/2022 (4 weeks):  Pt will show good recall of HEP requiring no more than minimal verbal cuing for proper form and technique. MET  Pt will increase cervical AROM to mod restrictions without pain, in order to improve safety in ADLs ad driving. - MET  Pt will report pain decreased to intermittent for improved ability to perform ADLs and exercise interventions.  - MET  Pt will demonstrate decreased hypertonicity in the upper trap to no more than mild intensity. Goal Met 1/11/2023  Pt will report symptoms have centralized to no farther than L CTJ indicating nerve healing and improving pain levels and muscle activation. - Goal Met 12/19/2022    Long term goals to be met by 1/4/2023  (8 weeks):  [facility] to improve safety when driving, comfort while reading, and sleep. Goal Not Met 1/11/2023 - Continue  Pt will improve NDI to </= 10 % disability, demonstrating improved overall function. Goal deferred 1/11/2023 - Continue  Pt will test negatively for cervical radiculopathy TIC, indicating appropriate mobility and healing of nerve tissues. Goal deferred 1/11/2023 - Continue  Pt will return to previous level of function without complaint of neck pain. Goal Not Met 1/11/2023 - Continue    [employer]  Access Code: [email or web address]  URL: [email or web address]  Date: 11/09/2022  Prepared by: Gutierrez Oneal    Exercises  Cervical Extension AROM with Strap - 2 x daily - 2 sets - 10 reps - 5 hold  Seated Assisted Cervical Rotation with Towel - 2 x daily - 2 sets - 10 reps - 2 hold  Reverse Push Ups - 2 x daily - 2 sets - 15 reps - 2 hold

## 2023-01-24 ENCOUNTER — OFFICE VISIT (OUTPATIENT)
Age: 83
End: 2023-01-24
Payer: MEDICARE

## 2023-01-24 DIAGNOSIS — Z74.09 IMPAIRED MOBILITY AND ADLS: ICD-10-CM

## 2023-01-24 DIAGNOSIS — M54.2 NECK PAIN: ICD-10-CM

## 2023-01-24 DIAGNOSIS — Z78.9 IMPAIRED MOTOR CONTROL: ICD-10-CM

## 2023-01-24 DIAGNOSIS — M53.82 IMPAIRED RANGE OF MOTION OF CERVICAL SPINE: ICD-10-CM

## 2023-01-24 DIAGNOSIS — M54.12 CERVICAL RADICULOPATHY: Primary | ICD-10-CM

## 2023-01-24 DIAGNOSIS — Z78.9 IMPAIRED MOBILITY AND ADLS: ICD-10-CM

## 2023-01-24 PROCEDURE — 97110 THERAPEUTIC EXERCISES: CPT

## 2023-01-24 PROCEDURE — 97140 MANUAL THERAPY 1/> REGIONS: CPT

## 2023-01-24 NOTE — PROGRESS NOTES
[address] [place] [facility]  [address] [facility] [address] [facility]  [address] [address]  Dept: [phone]      Physical Therapy Progress Report     Referring MD: RAFAEL Kraft*  Diagnosis:    Diagnosis Orders   1. Cervical radiculopathy        2. Neck pain        3. Impaired range of motion of cervical spine        4. Impaired mobility and ADLs        5. Impaired motor control            Surgery: n/a    Therapy precautions:None  Co-morbidities affecting plan of care: hypothyroid, HTN    PERTINENT MEDICAL HISTORY     Past medical and surgical history:   Past Medical History:   Diagnosis Date    Hypercholesteremia     Hypercholesterolemia     Hypertension     controlled with med    Other ill-defined conditions(799.89)     high cholesterol    Thyroid disease       Past Surgical History:   Procedure Laterality Date    BREAST BIOPSY Right 2014    cyst surgically removed    BREAST SURGERY      cyst removed right breast - skin    CHOLECYSTECTOMY      5/10; had ERCP 8/10 due to retained stone/shpincterectomy by Dr. Burns Shone hyst - fibroids    LAP,CHOLECYSTECTOMY  May 2010     Medications: reviewed in chart   Allergies: No Known Allergies     SUBJECTIVE     Chief complaints/history of injury: Patient reports onset of pain in the spring across both shoulders; she notes that movement of the right should will cause sharp pains across her neck into the left shoulder, and she experiences episodic numbness into all digits of the left hand    PN: 12/19/22  Laverne Royal of condition: Chronic (continuous duration > 3 months)  Describe current symptoms: Mild tightness in the L side neck at CTJ with pain at L ACJ    Pain Assessment:  Pain location: L ACJ    Average Pain/symptom intensity (0-10 scale)  Last 24 hours: 2/10  Last week (1-7 days): 2/10  How often do you feel symptoms? Occasionally (26-50%)    How much have your symptoms interfered with daily activities?  Moderately  How has your condition changed since receiving care at this facility? A little better  In general, would you say your current overall health is very good     Functional Outcome Measures: Neck Disability Index: 10/50= 20% functional deficit    Patient Stated Goals: reduce pain, return to PLOF, avoid surgery    Initial Evaluation: 11/9/22  Last Progress Note: 12/19/22  Total Visits: 4    Total Timed Codes: 40 min, Total Treatment Time: 40 min    SUBJECTIVE     Pt reports feeling good on arrival; she has not been bowling, but she has been walking longer distances, up to 1 mile, on the indoor track and has been increasing her load with home activities, such as cooking. She notes a mild soreness at the L side CTJ which advances to a mild throb with longer duration and overhead activities. Nature of condition: Chronic (continuous duration > 3 months)  Describe current symptoms: mild pain with throbbing at L CTJ    Pain Assessment:  Pain location: L CTJ    Average Pain/symptom intensity (0-10 scale)  Last 24 hours: 1/10  Last week (1-7 days): 1/10  How often do you feel symptoms? Intermittently (0-25%)    How much have your symptoms interfered with daily activities? A little bit  How has your condition changed since receiving care at this facility? Much better  In general, would you say your current overall health is very good     Functional Outcome Measures: Neck Disability Index: 3/50= 6% functional deficit  OBJECTIVE     Hand/Side Dominance: right handed  Observation:   Posture:  Forward head, Rounded shoulders, and Thoracic kyphosis          Neuro screen: patient denies numbness, tingling, or radiating (episodic with long duration overhead activities)     AROM  Cervical Restriction Overpressure   Flexion no     Extension mod     R side bend Mod     L side bend Mod     R rotation No     L Rotation No                        Radiculopathy Cluster  Cervical +/- Description   Rotation - <60 degrees   ULTTa - (B)     Distraction - (B)     Spurling's - Strength/MMT (0-5 Scale): Shoulder Right Left Comment   ER 5 5        Palpation/joint mobility: hypomobile L5-CTJ, 1st Rib (L, mod)    Treatment provided today:  Manual therapy (56889) x 15 min utilizing techniques to improve joint and/or soft tissue mobility, ROM, and function as well as helping to decrease pain/spasms and swelling. **Patient educated on and provided verbal consent for grade 5 mobilizations  Palpation and assessment of soft tissue, muscles, and landmarks   Cervical [id number] rib mobilization (B) grade 2-4    Therapeutic exercise (23053) x 25 min to develop ROM, strength, endurance and flexibility of the upper quarter. UBE (L1, 4/4 fwd/back) 8min  MMT and ROM assessment  Row  Mid  med band (head) 1x2min  Horizontal abduction  light band (head) 1x2min  Shoulder extension  Med band (alternating isometric) 1x2min    Patient Education on progress to date, POC with anticipated advanced HEP and D/C at next visit. ASSESSMENT     Progress Report Period: 12/19/22 to 1/24/2023  As of 1/24/2023, Huy Keyes has attended 19 PT sessions. Pt's attendance has been consistent with plan of care. Pt has progressed slower than anticipated with treatment. She has met all short term goals, has subjective reports of improved daily function with minimal to no pain onset except in overhead and long duration motions, and has improved functional deficit to 6% per LEFS. Objective findings revealed mod restrictions in cervical extension and sidebending, but functional to allow for normal ADLs and safety in driving, along with negative testing for cervical radiculopathy. Continued deficits include: Pain  ROM limitations  Motor control deficits  Restricted recreational participation. Pt demonstrates mild or moderate objective and functional deficits without instability: sporadic symptoms with min to mod loss of ROM, strength, or ADLs.  Pt has not achieved max rehab potential and would benefit from continued skilled PT to address the above impairments and continue progress towards remaining therapy goals. PLAN     One additional visit for full review of advanced HEP. Manual treatment, including cervical traction, for improved cervicothoracic mobility. Focus on thoracic mobility and scapular strength to unload upper traps and alleviate paresthetic symptoms. PLAN OF CARE     Effective Dates: 11/9/2022 TO 2/7/2023 (90 days). Frequency/Duration: 1-2 sessions for 30 Day(s)  Interventions may include but are not limited to: (10312) Therapeutic exercise to develop ROM, strength, endurance and flexibility  (04506) Therapeutic activities using dynamic activities to improve function  (49418) Manual therapy techniques to improve joint and/or soft tissue mobility, ROM, and function as well as helping to decrease pain/spasms and swelling  (64009) Mechanical traction to address spinal/nerve/disc compression, improve joint mobility, and/or soft tissue flexibility  Home exercise program (HEP) development    GOALS     Short term goals to be met by 12/7/2022  (4 weeks):  Pt will show good recall of HEP requiring no more than minimal verbal cuing for proper form and technique. MET  Pt will increase cervical AROM to mod restrictions without pain, in order to improve safety in ADLs ad driving. - MET  Pt will report pain decreased to intermittent for improved ability to perform ADLs and exercise interventions.  - MET  Pt will demonstrate decreased hypertonicity in the upper trap to no more than mild intensity. Goal Met 1/11/2023  Pt will report symptoms have centralized to no farther than L CTJ indicating nerve healing and improving pain levels and muscle activation. - Goal Met 12/19/2022    Long term goals to be met by 1/4/2023  (8 weeks):  Pt will achieve cervical AROM to [facility] to improve safety when driving, comfort while reading, and sleep.  Goal Not Met 1/24/2023 - Continue  Pt will improve NDI to </= 10 % disability, demonstrating improved overall function. Goal Met 1/24/2023  Pt will test negatively for cervical radiculopathy TIC, indicating appropriate mobility and healing of nerve tissues. Goal Met 1/24/2023  Pt will return to previous level of function without complaint of neck pain. Goal Met 1/24/2023    [employer]  Access Code: [employer]  URL: [email or web address]  Date: 11/09/2022  Prepared by: Carlo Begum    Exercises  Cervical Extension AROM with Strap - 2 x daily - 2 sets - 10 reps - 5 hold  Seated Assisted Cervical Rotation with Towel - 2 x daily - 2 sets - 10 reps - 2 hold  Reverse Push Ups - 2 x daily - 2 sets - 15 reps - 2 hold

## 2023-02-01 ENCOUNTER — OFFICE VISIT (OUTPATIENT)
Age: 83
End: 2023-02-01
Payer: MEDICARE

## 2023-02-01 DIAGNOSIS — M54.12 CERVICAL RADICULOPATHY: Primary | ICD-10-CM

## 2023-02-01 DIAGNOSIS — Z78.9 IMPAIRED MOBILITY AND ADLS: ICD-10-CM

## 2023-02-01 DIAGNOSIS — Z74.09 IMPAIRED MOBILITY AND ADLS: ICD-10-CM

## 2023-02-01 DIAGNOSIS — M53.82 IMPAIRED RANGE OF MOTION OF CERVICAL SPINE: ICD-10-CM

## 2023-02-01 DIAGNOSIS — M54.2 NECK PAIN: ICD-10-CM

## 2023-02-01 DIAGNOSIS — Z78.9 IMPAIRED MOTOR CONTROL: ICD-10-CM

## 2023-02-01 PROCEDURE — 97110 THERAPEUTIC EXERCISES: CPT

## 2023-02-01 NOTE — PROGRESS NOTES
[address] [place] [facility]  [address] [address]  Dept: [phone]      Physical Therapy Discharge     Referring MD: RAFAEL Baltazar*  Diagnosis:    Diagnosis Orders   1. Cervical radiculopathy        2. Neck pain        3. Impaired range of motion of cervical spine        4. Impaired mobility and ADLs        5. Impaired motor control          Surgery: n/a    Therapy precautions:None  Co-morbidities affecting plan of care: hypothyroid, HTN    PERTINENT MEDICAL HISTORY     Past medical and surgical history:   Past Medical History:   Diagnosis Date    Hypercholesteremia     Hypercholesterolemia     Hypertension     controlled with med    Other ill-defined conditions(799.89)     high cholesterol    Thyroid disease       Past Surgical History:   Procedure Laterality Date    BREAST BIOPSY Right 2014    cyst surgically removed    BREAST SURGERY      cyst removed right breast - skin    CHOLECYSTECTOMY      5/10; had ERCP 8/10 due to retained stone/shpincterectomy by Dr. Ayana Dover hyst - fibroids    LAP,CHOLECYSTECTOMY  May 2010     Medications: reviewed in chart   Allergies: No Known Allergies     SUBJECTIVE     Chief complaints/history of injury: Patient reports onset of pain in the spring across both shoulders; she notes that movement of the right should will cause sharp pains across her neck into the left shoulder, and she experiences episodic numbness into all digits of the left hand    PN: 1/24/23  Mer Dodson of condition: Chronic (continuous duration > 3 months)  Describe current symptoms: mild pain with throbbing at L CTJ    Pain Assessment:  Pain location: L CTJ    Average Pain/symptom intensity (0-10 scale)  Last 24 hours: 1/10  Last week (1-7 days): 1/10  How often do you feel symptoms? Intermittently (0-25%)    How much have your symptoms interfered with daily activities? A little bit  How has your condition changed since receiving care at this facility?  Much better  In general, would you say your current overall health is very good    Patient Stated Goals: reduce pain, return to PLOF, avoid surgery    Initial Evaluation: 11/9/22  Last Progress Note: 12/19/22  Total Visits: 5    Total Timed Codes: 40 min, Total Treatment Time: 40 min    SUBJECTIVE     Pt reports being symptom free overall; she has been taking gabapentin at night prior to sleeping, and feels it is having a positive effect. She has bowled a full game over the last week, noting fatigue, but that she had the mirian to herself. She plans to go with a partner this week to allow increased rest between frames and to complete 2 games. OBJECTIVE     Hand/Side Dominance: right handed  Observation:   Posture: Forward head, Rounded shoulders, and Thoracic kyphosis          Neuro screen: patient denies numbness, tingling, or radiating (episodic with long duration overhead activities)     AROM  Cervical Restriction Overpressure   Flexion no     Extension mod     R side bend Mod     L side bend Mod     R rotation No     L Rotation No                        Palpation/joint mobility: hypomobile L5-CTJ, 1st Rib (L, mod)    Treatment provided today:  Therapeutic exercise (52036) x 40 min to develop ROM, strength, endurance and flexibility of the upper quarter. UBE (L1, 4/4 fwd/back) 8min  MMT and ROM assessment  Comprehensive HEP review as detailed below    ASSESSMENT     Progress Report Period: 1/24 to 2/1/23   As of 2/1/2023, Neal Carcamo has attended 19 PT sessions. Pt's attendance has been consistent with plan of care. Pt has progressed as anticipated with treatment. She has met all short and long term goals. She has subjective reports of improved ADL function, performing normal household tasks, along with return to recreational activities without difficulty, and has improved functional deficit to 6%.  Objective findings revealed functional AROM, with only deficits in extension and sidebending, but appropriate strength globally, with negative testing for cervical radiculopathy and RTC pathology. Pt has  achieved relevant therapeutic goals and is appropriate for self management with continued HEP . PLAN     Discharge with continued HEP. GOALS     Short term goals to be met by 12/7/2022  (4 weeks):  Pt will show good recall of HEP requiring no more than minimal verbal cuing for proper form and technique. MET  Pt will increase cervical AROM to mod restrictions without pain, in order to improve safety in ADLs ad driving. - MET  Pt will report pain decreased to intermittent for improved ability to perform ADLs and exercise interventions.  - MET  Pt will demonstrate decreased hypertonicity in the upper trap to no more than mild intensity. Goal Met 1/11/2023  Pt will report symptoms have centralized to no farther than L CTJ indicating nerve healing and improving pain levels and muscle activation. - Goal Met 12/19/2022    Long term goals to be met by 1/4/2023  (8 weeks):  Pt will achieve cervical AROM to [facility] to improve safety when driving, comfort while reading, and sleep. Goal Met 2/1/2023  Pt will improve NDI to </= 10 % disability, demonstrating improved overall function. Goal Met 1/24/2023  Pt will test negatively for cervical radiculopathy TIC, indicating appropriate mobility and healing of nerve tissues. Goal Met 1/24/2023  Pt will return to previous level of function without complaint of neck pain. Goal Met 1/24/2023    [employer]  Access Code: Lana Jacome  URL: [email or web address]  Date: 02/01/2023  Prepared by: Edgardo Schwartz    Exercises  Cervical Extension AROM with Strap - 2 x daily - 2 sets - 10 reps - 5 hold  Seated Assisted Cervical Rotation with Towel - 2 x daily - 2 sets - 10 reps - 2 hold  Reverse Push Ups - 2 x daily - 2 sets - 15 reps - 2 hold  Bent Over Single Arm Shoulder Row with Dumbbell - 3 x weekly - 2 sets - 15 reps  Single Arm Bent Over Shoulder Horizontal Abduction with Dumbbell - Palm Down - 3 x weekly - 2 sets - 15 reps  Single Arm Bent Over Shoulder Extension with Dumbbell - 3 x weekly - 2 sets - 15 reps  Single Arm Bent Over Shoulder Scaption with Dumbbell - 3 x weekly - 2 sets - 15 reps

## 2023-06-20 ENCOUNTER — NURSE ONLY (OUTPATIENT)
Dept: FAMILY MEDICINE CLINIC | Facility: CLINIC | Age: 83
End: 2023-06-20

## 2023-06-20 DIAGNOSIS — E03.9 ACQUIRED HYPOTHYROIDISM: ICD-10-CM

## 2023-06-20 DIAGNOSIS — E78.2 MIXED HYPERLIPIDEMIA: ICD-10-CM

## 2023-06-20 DIAGNOSIS — I10 ESSENTIAL HYPERTENSION, BENIGN: ICD-10-CM

## 2023-06-20 LAB
ALBUMIN SERPL-MCNC: 3.8 G/DL (ref 3.2–4.6)
ALBUMIN/GLOB SERPL: 1.2 (ref 0.4–1.6)
ALP SERPL-CCNC: 51 U/L (ref 50–136)
ALT SERPL-CCNC: 30 U/L (ref 12–65)
ANION GAP SERPL CALC-SCNC: 6 MMOL/L (ref 2–11)
AST SERPL-CCNC: 25 U/L (ref 15–37)
BASOPHILS # BLD: 0 K/UL (ref 0–0.2)
BASOPHILS NFR BLD: 1 % (ref 0–2)
BILIRUB SERPL-MCNC: 0.7 MG/DL (ref 0.2–1.1)
BUN SERPL-MCNC: 21 MG/DL (ref 8–23)
CALCIUM SERPL-MCNC: 9.7 MG/DL (ref 8.3–10.4)
CHLORIDE SERPL-SCNC: 103 MMOL/L (ref 101–110)
CHOLEST SERPL-MCNC: 151 MG/DL
CO2 SERPL-SCNC: 31 MMOL/L (ref 21–32)
CREAT SERPL-MCNC: 0.9 MG/DL (ref 0.6–1)
DIFFERENTIAL METHOD BLD: NORMAL
EOSINOPHIL # BLD: 0.1 K/UL (ref 0–0.8)
EOSINOPHIL NFR BLD: 2 % (ref 0.5–7.8)
ERYTHROCYTE [DISTWIDTH] IN BLOOD BY AUTOMATED COUNT: 12.4 % (ref 11.9–14.6)
GLOBULIN SER CALC-MCNC: 3.3 G/DL (ref 2.8–4.5)
GLUCOSE SERPL-MCNC: 95 MG/DL (ref 65–100)
HCT VFR BLD AUTO: 42.7 % (ref 35.8–46.3)
HDLC SERPL-MCNC: 50 MG/DL (ref 40–60)
HDLC SERPL: 3
HGB BLD-MCNC: 14.5 G/DL (ref 11.7–15.4)
IMM GRANULOCYTES # BLD AUTO: 0 K/UL (ref 0–0.5)
IMM GRANULOCYTES NFR BLD AUTO: 0 % (ref 0–5)
LDLC SERPL CALC-MCNC: 80.6 MG/DL
LYMPHOCYTES # BLD: 1.6 K/UL (ref 0.5–4.6)
LYMPHOCYTES NFR BLD: 28 % (ref 13–44)
MCH RBC QN AUTO: 32.1 PG (ref 26.1–32.9)
MCHC RBC AUTO-ENTMCNC: 34 G/DL (ref 31.4–35)
MCV RBC AUTO: 94.5 FL (ref 82–102)
MONOCYTES # BLD: 0.7 K/UL (ref 0.1–1.3)
MONOCYTES NFR BLD: 12 % (ref 4–12)
NEUTS SEG # BLD: 3.1 K/UL (ref 1.7–8.2)
NEUTS SEG NFR BLD: 57 % (ref 43–78)
NRBC # BLD: 0 K/UL (ref 0–0.2)
PLATELET # BLD AUTO: 186 K/UL (ref 150–450)
PMV BLD AUTO: 11.3 FL (ref 9.4–12.3)
POTASSIUM SERPL-SCNC: 3.5 MMOL/L (ref 3.5–5.1)
PROT SERPL-MCNC: 7.1 G/DL (ref 6.3–8.2)
RBC # BLD AUTO: 4.52 M/UL (ref 4.05–5.2)
SODIUM SERPL-SCNC: 140 MMOL/L (ref 133–143)
TRIGL SERPL-MCNC: 102 MG/DL (ref 35–150)
TSH, 3RD GENERATION: 0.97 UIU/ML (ref 0.36–3.74)
VLDLC SERPL CALC-MCNC: 20.4 MG/DL (ref 6–23)
WBC # BLD AUTO: 5.5 K/UL (ref 4.3–11.1)

## 2023-06-23 SDOH — HEALTH STABILITY: PHYSICAL HEALTH: ON AVERAGE, HOW MANY MINUTES DO YOU ENGAGE IN EXERCISE AT THIS LEVEL?: 50 MIN

## 2023-06-23 SDOH — ECONOMIC STABILITY: FOOD INSECURITY: WITHIN THE PAST 12 MONTHS, YOU WORRIED THAT YOUR FOOD WOULD RUN OUT BEFORE YOU GOT MONEY TO BUY MORE.: NEVER TRUE

## 2023-06-23 SDOH — HEALTH STABILITY: PHYSICAL HEALTH: ON AVERAGE, HOW MANY DAYS PER WEEK DO YOU ENGAGE IN MODERATE TO STRENUOUS EXERCISE (LIKE A BRISK WALK)?: 4 DAYS

## 2023-06-23 SDOH — ECONOMIC STABILITY: FOOD INSECURITY: WITHIN THE PAST 12 MONTHS, THE FOOD YOU BOUGHT JUST DIDN'T LAST AND YOU DIDN'T HAVE MONEY TO GET MORE.: NEVER TRUE

## 2023-06-23 SDOH — ECONOMIC STABILITY: INCOME INSECURITY: HOW HARD IS IT FOR YOU TO PAY FOR THE VERY BASICS LIKE FOOD, HOUSING, MEDICAL CARE, AND HEATING?: NOT HARD AT ALL

## 2023-06-23 SDOH — ECONOMIC STABILITY: HOUSING INSECURITY
IN THE LAST 12 MONTHS, WAS THERE A TIME WHEN YOU DID NOT HAVE A STEADY PLACE TO SLEEP OR SLEPT IN A SHELTER (INCLUDING NOW)?: NO

## 2023-06-23 SDOH — ECONOMIC STABILITY: TRANSPORTATION INSECURITY
IN THE PAST 12 MONTHS, HAS LACK OF TRANSPORTATION KEPT YOU FROM MEETINGS, WORK, OR FROM GETTING THINGS NEEDED FOR DAILY LIVING?: NO

## 2023-06-23 ASSESSMENT — LIFESTYLE VARIABLES
HOW OFTEN DO YOU HAVE SIX OR MORE DRINKS ON ONE OCCASION: 1
HOW MANY STANDARD DRINKS CONTAINING ALCOHOL DO YOU HAVE ON A TYPICAL DAY: 1
HOW OFTEN DO YOU HAVE A DRINK CONTAINING ALCOHOL: 2
HOW OFTEN DO YOU HAVE A DRINK CONTAINING ALCOHOL: MONTHLY OR LESS
HOW MANY STANDARD DRINKS CONTAINING ALCOHOL DO YOU HAVE ON A TYPICAL DAY: 1 OR 2

## 2023-06-23 ASSESSMENT — PATIENT HEALTH QUESTIONNAIRE - PHQ9
SUM OF ALL RESPONSES TO PHQ QUESTIONS 1-9: 0
1. LITTLE INTEREST OR PLEASURE IN DOING THINGS: 0
SUM OF ALL RESPONSES TO PHQ QUESTIONS 1-9: 0
SUM OF ALL RESPONSES TO PHQ9 QUESTIONS 1 & 2: 0
2. FEELING DOWN, DEPRESSED OR HOPELESS: 0
SUM OF ALL RESPONSES TO PHQ QUESTIONS 1-9: 0
SUM OF ALL RESPONSES TO PHQ QUESTIONS 1-9: 0

## 2023-06-26 ENCOUNTER — OFFICE VISIT (OUTPATIENT)
Dept: FAMILY MEDICINE CLINIC | Facility: CLINIC | Age: 83
End: 2023-06-26
Payer: MEDICARE

## 2023-06-26 VITALS
WEIGHT: 146 LBS | RESPIRATION RATE: 16 BRPM | OXYGEN SATURATION: 94 % | DIASTOLIC BLOOD PRESSURE: 59 MMHG | SYSTOLIC BLOOD PRESSURE: 148 MMHG | HEART RATE: 72 BPM | BODY MASS INDEX: 27.56 KG/M2 | HEIGHT: 61 IN | TEMPERATURE: 97.2 F

## 2023-06-26 DIAGNOSIS — I10 ESSENTIAL HYPERTENSION, BENIGN: ICD-10-CM

## 2023-06-26 DIAGNOSIS — Z00.00 MEDICARE ANNUAL WELLNESS VISIT, SUBSEQUENT: Primary | ICD-10-CM

## 2023-06-26 DIAGNOSIS — E78.2 MIXED HYPERLIPIDEMIA: ICD-10-CM

## 2023-06-26 DIAGNOSIS — E03.9 ACQUIRED HYPOTHYROIDISM: ICD-10-CM

## 2023-06-26 PROCEDURE — 3077F SYST BP >= 140 MM HG: CPT | Performed by: FAMILY MEDICINE

## 2023-06-26 PROCEDURE — G0439 PPPS, SUBSEQ VISIT: HCPCS | Performed by: FAMILY MEDICINE

## 2023-06-26 PROCEDURE — 1123F ACP DISCUSS/DSCN MKR DOCD: CPT | Performed by: FAMILY MEDICINE

## 2023-06-26 PROCEDURE — 3078F DIAST BP <80 MM HG: CPT | Performed by: FAMILY MEDICINE

## 2023-06-26 RX ORDER — LEVOTHYROXINE SODIUM 0.07 MG/1
75 TABLET ORAL
Qty: 90 TABLET | Refills: 3 | Status: SHIPPED | OUTPATIENT
Start: 2023-06-26

## 2023-06-26 RX ORDER — HYDROCHLOROTHIAZIDE 25 MG/1
12.5 TABLET ORAL DAILY
Qty: 90 TABLET | Refills: 3 | Status: SHIPPED | OUTPATIENT
Start: 2023-06-26

## 2023-06-26 RX ORDER — LOVASTATIN 20 MG/1
20 TABLET ORAL NIGHTLY
Qty: 90 TABLET | Refills: 3 | Status: SHIPPED | OUTPATIENT
Start: 2023-06-26

## 2024-03-26 ENCOUNTER — OFFICE VISIT (OUTPATIENT)
Dept: FAMILY MEDICINE CLINIC | Facility: CLINIC | Age: 84
End: 2024-03-26
Payer: MEDICARE

## 2024-03-26 VITALS
BODY MASS INDEX: 27.94 KG/M2 | WEIGHT: 148 LBS | OXYGEN SATURATION: 95 % | RESPIRATION RATE: 16 BRPM | TEMPERATURE: 97.3 F | HEART RATE: 70 BPM | HEIGHT: 61 IN | SYSTOLIC BLOOD PRESSURE: 140 MMHG | DIASTOLIC BLOOD PRESSURE: 70 MMHG

## 2024-03-26 DIAGNOSIS — M25.511 ACUTE PAIN OF RIGHT SHOULDER: ICD-10-CM

## 2024-03-26 DIAGNOSIS — M79.641 HAND PAIN, RIGHT: Primary | ICD-10-CM

## 2024-03-26 PROCEDURE — G8419 CALC BMI OUT NRM PARAM NOF/U: HCPCS | Performed by: FAMILY MEDICINE

## 2024-03-26 PROCEDURE — 3077F SYST BP >= 140 MM HG: CPT | Performed by: FAMILY MEDICINE

## 2024-03-26 PROCEDURE — G8399 PT W/DXA RESULTS DOCUMENT: HCPCS | Performed by: FAMILY MEDICINE

## 2024-03-26 PROCEDURE — G8484 FLU IMMUNIZE NO ADMIN: HCPCS | Performed by: FAMILY MEDICINE

## 2024-03-26 PROCEDURE — 99213 OFFICE O/P EST LOW 20 MIN: CPT | Performed by: FAMILY MEDICINE

## 2024-03-26 PROCEDURE — 1090F PRES/ABSN URINE INCON ASSESS: CPT | Performed by: FAMILY MEDICINE

## 2024-03-26 PROCEDURE — 3078F DIAST BP <80 MM HG: CPT | Performed by: FAMILY MEDICINE

## 2024-03-26 PROCEDURE — 1036F TOBACCO NON-USER: CPT | Performed by: FAMILY MEDICINE

## 2024-03-26 PROCEDURE — G8427 DOCREV CUR MEDS BY ELIG CLIN: HCPCS | Performed by: FAMILY MEDICINE

## 2024-03-26 PROCEDURE — 1123F ACP DISCUSS/DSCN MKR DOCD: CPT | Performed by: FAMILY MEDICINE

## 2024-03-26 RX ORDER — MELOXICAM 7.5 MG/1
7.5 TABLET ORAL DAILY
Qty: 30 TABLET | Refills: 3 | Status: SHIPPED | OUTPATIENT
Start: 2024-03-26

## 2024-03-26 ASSESSMENT — PATIENT HEALTH QUESTIONNAIRE - PHQ9
2. FEELING DOWN, DEPRESSED OR HOPELESS: NOT AT ALL
1. LITTLE INTEREST OR PLEASURE IN DOING THINGS: NOT AT ALL
1. LITTLE INTEREST OR PLEASURE IN DOING THINGS: NOT AT ALL
SUM OF ALL RESPONSES TO PHQ QUESTIONS 1-9: 0
SUM OF ALL RESPONSES TO PHQ9 QUESTIONS 1 & 2: 0
2. FEELING DOWN, DEPRESSED OR HOPELESS: NOT AT ALL
SUM OF ALL RESPONSES TO PHQ QUESTIONS 1-9: 0
SUM OF ALL RESPONSES TO PHQ9 QUESTIONS 1 & 2: 0

## 2024-03-26 NOTE — PROGRESS NOTES
[facility] [place] [address]  Phone: [phone] Fax [phone]  Caroline Olvera MD  3/26/2024           Ms. Donald  is a 83 y.o.  year old  female patient who comes in complaining of pain across the top of her right thumb into her right wrist on the radial side.  She has not fallen or injured it in any way.  It does not hurt all the time but will hurt when she moves it a certain way.  She also says the pain will go up into her forearm and she is also noticed right shoulder pain really more into the lower part of her shoulder into her biceps area.  It hurts when her arm is just hanging.  Does not hurt to move it.  She has not injured it in any way.  Advil does seem to help but it makes her groggy.  She has not fallen.    Ms. Donald  has  has a past medical history of Hypercholesteremia, Hypercholesterolemia, Hypertension, Other ill-defined conditions(579.86), and Thyroid disease.    Ms. Donald  has  has a past surgical history that includes Breast biopsy (Right, 2014); gyn; Cholecystectomy; lap,cholecystectomy (May 2010); and Breast surgery.    Ms. Donald   Current Outpatient Medications   Medication Sig Dispense Refill    meloxicam (MOBIC) 7.5 MG tablet Take 1 tablet by mouth daily 30 tablet 3    hydroCHLOROthiazide (HYDRODIURIL) 25 MG tablet Take 0.5 tablets by mouth daily 90 tablet 3    levothyroxine (SYNTHROID) 75 MCG tablet Take 1 tablet by mouth every morning (before breakfast) 90 tablet 3    lovastatin (MEVACOR) 20 MG tablet Take 1 tablet by mouth nightly 90 tablet 3     No current facility-administered medications for this visit.       Ms. Donald   Social History     Socioeconomic History    Marital status:      Spouse name: None    Number of children: None    Years of education: None    Highest education level: None   Tobacco Use    Smoking status: Former     Current packs/day: 0.00     Types: Cigarettes     Quit date: 5/1/1980     Years since

## 2024-04-08 ENCOUNTER — HOSPITAL ENCOUNTER (OUTPATIENT)
Dept: PHYSICAL THERAPY | Age: 84
Setting detail: RECURRING SERIES
Discharge: HOME | End: 2024-04-11
Payer: MEDICARE

## 2024-04-08 DIAGNOSIS — M25.531 PAIN IN RIGHT WRIST: ICD-10-CM

## 2024-04-08 DIAGNOSIS — M25.511 RIGHT SHOULDER PAIN, UNSPECIFIED CHRONICITY: Primary | ICD-10-CM

## 2024-04-08 DIAGNOSIS — M79.644 THUMB PAIN, RIGHT: ICD-10-CM

## 2024-04-08 PROCEDURE — 97161 PT EVAL LOW COMPLEX 20 MIN: CPT

## 2024-04-08 PROCEDURE — 97110 THERAPEUTIC EXERCISES: CPT

## 2024-04-08 NOTE — THERAPY EVALUATION
Magy Donald  : 1940  Primary: Medicare Part A And B (Medicare)  Secondary: [employer] HE[employer] C[employer] MEDICARE SUPP [facility] [place]  [address] A  [facility] [address]  Phone: [phone]  Fax: [phone] Plan Frequency: 1-2 sessions per week for 90 days    Plan of Care/Certification Expiration Date: 24        Plan of Care/Certification Expiration Date:  Plan of Care/Certification Expiration Date: 24    Frequency/Duration: Plan Frequency: 1-2 sessions per week for 90 days      Time In/Out:   Time In: 1005  Time Out: 1100      PT Visit Info:         Visit Count:  1                OUTPATIENT PHYSICAL THERAPY:             Initial Assessment 2024               Episode (R Wrist Pain)         Treatment Diagnosis:     Right shoulder pain, unspecified chronicity  Pain in right wrist  Thumb pain, right  Medical/Referring Diagnosis:    Acute pain of right shoulder [M25.511]    Referring Physician:  Caroline Olvera MD MD Orders:  PT Eval and Treat   Return MD Appt:  TBD  Date of Onset:  Onset Date: 24 (Pain started about 3-4 months ago)     Allergies:  Patient has no known allergies.  Restrictions/Precautions:    None      Medications Last Reviewed:  2024     SUBJECTIVE   History of Injury/Illness (Reason for Referral):  Ms. Magy Donald has attended 1 physical therapy session including initial evaluation as of 2024. Magy Donald is a 83 y.o. female with complaints of R wrist pain that occasionally travels proximally towards her R shoulder. Patient noticed her became worse when she was tightening a cap with her R hand about 3-4 months ago. Her pain has remained consistent. Patient reports difficulty with reaching and grabbing items overhead, gripping, and using her cookie  tool. She currently takes meloxicam for the pain but it only mildly helps. She can garden for 30 minutes before onset of pain. Patient is a bowler for 20 years.

## 2024-04-09 ASSESSMENT — PAIN SCALES - GENERAL: PAINLEVEL_OUTOF10: 1

## 2024-04-10 ENCOUNTER — HOSPITAL ENCOUNTER (OUTPATIENT)
Dept: PHYSICAL THERAPY | Age: 84
Setting detail: RECURRING SERIES
Discharge: HOME | End: 2024-04-13
Payer: MEDICARE

## 2024-04-10 PROCEDURE — 97110 THERAPEUTIC EXERCISES: CPT

## 2024-04-10 PROCEDURE — 97140 MANUAL THERAPY 1/> REGIONS: CPT

## 2024-04-10 NOTE — PROGRESS NOTES
Magy HODAN Odilon  : 1940  Primary: Medicare Part A And B (Medicare)  Secondary: [employer] HE[employer] C[employer] MEDICARE SUPP [facility] [place]  [address] A  [facility] [address]  Phone: [phone]  Fax: [phone] Plan Frequency: 1-2 sessions per week for 90 days    Plan of Care/Certification Expiration Date: 24        Plan of Care/Certification Expiration Date:  Plan of Care/Certification Expiration Date: 24    Frequency/Duration:   Plan Frequency: 1-2 sessions per week for 90 days      Time In/Out:   Time In: 1245  Time Out: 1325      PT Visit Info:         Visit Count:  2    OUTPATIENT PHYSICAL THERAPY:   Treatment Note 4/10/2024       Episode  (R Wrist Pain)               Treatment Diagnosis:    Right shoulder pain, unspecified chronicity  Pain in right wrist  Thumb pain, right  Medical/Referring Diagnosis:    Acute pain of right shoulder [M25.511]    Referring Physician:  Caroline Olvera MD MD Orders:  PT Eval and Treat   Return MD Appt:  TBD   Date of Onset:  Onset Date: 24 (Pain started about 3-4 months ago)     Allergies:   Patient has no known allergies.  Restrictions/Precautions:   None      Interventions Planned (Treatment may consist of any combination of the following):     See Assessment Note    Subjective Comments:   Patient reports R UE pain when she was pushing the grocery cart. Was able to go bowling yesterday without pain though.   Initial Pain Level::     1/10  Post Session Pain Level:       3/10  Medications Last Reviewed:  4/10/2024  Updated Objective Findings:   Tender to distal R radius and R CMC joint.   Treatment   THERAPEUTIC EXERCISE: (10 minutes):    Exercises per grid below to improve mobility and strength.  Required moderate visual, verbal, manual, and tactile cues to promote proper body alignment, promote proper body posture, and promote proper body mechanics.  Progressed resistance, range, repetitions, and complexity of

## 2024-04-11 ASSESSMENT — PAIN SCALES - GENERAL: PAINLEVEL_OUTOF10: 1

## 2024-04-15 ENCOUNTER — HOSPITAL ENCOUNTER (OUTPATIENT)
Dept: PHYSICAL THERAPY | Age: 84
Setting detail: RECURRING SERIES
Discharge: HOME | End: 2024-04-18
Payer: MEDICARE

## 2024-04-15 PROCEDURE — 97140 MANUAL THERAPY 1/> REGIONS: CPT

## 2024-04-15 PROCEDURE — 97110 THERAPEUTIC EXERCISES: CPT

## 2024-04-15 ASSESSMENT — PAIN SCALES - GENERAL: PAINLEVEL_OUTOF10: 1

## 2024-04-15 NOTE — PROGRESS NOTES
Magy Donald  : 1940  Primary: Medicare Part A And B (Medicare)  Secondary: [employer] HE[employer] C[employer] MEDICARE SUPP [facility] [place]  [address]a [address]  [facility] [address]  Phone: [phone]  Fax: [phone] Plan Frequency: 1-2 sessions per week for 90 days    Plan of Care/Certification Expiration Date: 24        Plan of Care/Certification Expiration Date:  Plan of Care/Certification Expiration Date: 24    Frequency/Duration:   Plan Frequency: 1-2 sessions per week for 90 days      Time In/Out:   Time In: 1529  Time Out: 1624      PT Visit Info:         Visit Count:  3    OUTPATIENT PHYSICAL THERAPY:   Treatment Note 4/15/2024       Episode  (R Wrist Pain)               Treatment Diagnosis:    Right shoulder pain, unspecified chronicity  Pain in right wrist  Thumb pain, right  Medical/Referring Diagnosis:    Acute pain of right shoulder [M25.511]    Referring Physician:  Caroline Olvera MD MD Orders:  PT Eval and Treat   Return MD Appt:  TBD   Date of Onset:  Onset Date: 24 (Pain started about 3-4 months ago)     Allergies:   Patient has no known allergies.  Restrictions/Precautions:   None      Interventions Planned (Treatment may consist of any combination of the following):     See Assessment Note    Subjective Comments:   Patient reports R arm and wrist pain with pushing activities.   Initial Pain Level::     10  Post Session Pain Level:        /10  Medications Last Reviewed:  4/15/2024  Updated Objective Findings:   See below  Treatment   THERAPEUTIC EXERCISE: (24 minutes):    Exercises per grid below to improve mobility and strength.  Required moderate visual, verbal, manual, and tactile cues to promote proper body alignment, promote proper body posture, and promote proper body mechanics.  Progressed resistance, range, repetitions, and complexity of movement as indicated.   Date:  2024  Date:  4/10/24 Date:  4/15/24   Activity/Exercise

## 2024-04-17 ENCOUNTER — OFFICE VISIT (OUTPATIENT)
Dept: FAMILY MEDICINE CLINIC | Facility: CLINIC | Age: 84
End: 2024-04-17
Payer: MEDICARE

## 2024-04-17 VITALS
HEIGHT: 61 IN | RESPIRATION RATE: 16 BRPM | OXYGEN SATURATION: 95 % | DIASTOLIC BLOOD PRESSURE: 66 MMHG | TEMPERATURE: 97.3 F | SYSTOLIC BLOOD PRESSURE: 179 MMHG | BODY MASS INDEX: 27.75 KG/M2 | WEIGHT: 147 LBS | HEART RATE: 74 BPM

## 2024-04-17 DIAGNOSIS — M79.641 HAND PAIN, RIGHT: Primary | ICD-10-CM

## 2024-04-17 DIAGNOSIS — E78.2 MIXED HYPERLIPIDEMIA: ICD-10-CM

## 2024-04-17 DIAGNOSIS — I10 ESSENTIAL HYPERTENSION, BENIGN: ICD-10-CM

## 2024-04-17 DIAGNOSIS — M25.511 ACUTE PAIN OF RIGHT SHOULDER: ICD-10-CM

## 2024-04-17 DIAGNOSIS — E03.9 ACQUIRED HYPOTHYROIDISM: ICD-10-CM

## 2024-04-17 PROCEDURE — 1036F TOBACCO NON-USER: CPT | Performed by: FAMILY MEDICINE

## 2024-04-17 PROCEDURE — 1123F ACP DISCUSS/DSCN MKR DOCD: CPT | Performed by: FAMILY MEDICINE

## 2024-04-17 PROCEDURE — 3078F DIAST BP <80 MM HG: CPT | Performed by: FAMILY MEDICINE

## 2024-04-17 PROCEDURE — G8419 CALC BMI OUT NRM PARAM NOF/U: HCPCS | Performed by: FAMILY MEDICINE

## 2024-04-17 PROCEDURE — 3077F SYST BP >= 140 MM HG: CPT | Performed by: FAMILY MEDICINE

## 2024-04-17 PROCEDURE — 99214 OFFICE O/P EST MOD 30 MIN: CPT | Performed by: FAMILY MEDICINE

## 2024-04-17 PROCEDURE — G8399 PT W/DXA RESULTS DOCUMENT: HCPCS | Performed by: FAMILY MEDICINE

## 2024-04-17 PROCEDURE — G8427 DOCREV CUR MEDS BY ELIG CLIN: HCPCS | Performed by: FAMILY MEDICINE

## 2024-04-17 PROCEDURE — 1090F PRES/ABSN URINE INCON ASSESS: CPT | Performed by: FAMILY MEDICINE

## 2024-04-17 RX ORDER — LOVASTATIN 20 MG/1
20 TABLET ORAL NIGHTLY
Qty: 90 TABLET | Refills: 3 | Status: SHIPPED | OUTPATIENT
Start: 2024-04-17

## 2024-04-17 RX ORDER — LEVOTHYROXINE SODIUM 0.07 MG/1
75 TABLET ORAL
Qty: 90 TABLET | Refills: 3 | Status: SHIPPED | OUTPATIENT
Start: 2024-04-17

## 2024-04-17 RX ORDER — MELOXICAM 7.5 MG/1
7.5 TABLET ORAL DAILY
Qty: 30 TABLET | Refills: 3 | Status: CANCELLED | OUTPATIENT
Start: 2024-04-17

## 2024-04-17 RX ORDER — HYDROCHLOROTHIAZIDE 25 MG/1
12.5 TABLET ORAL DAILY
Qty: 90 TABLET | Refills: 3 | Status: SHIPPED | OUTPATIENT
Start: 2024-04-17

## 2024-04-17 ASSESSMENT — PATIENT HEALTH QUESTIONNAIRE - PHQ9
SUM OF ALL RESPONSES TO PHQ QUESTIONS 1-9: 0
SUM OF ALL RESPONSES TO PHQ QUESTIONS 1-9: 0
1. LITTLE INTEREST OR PLEASURE IN DOING THINGS: NOT AT ALL
2. FEELING DOWN, DEPRESSED OR HOPELESS: NOT AT ALL
SUM OF ALL RESPONSES TO PHQ QUESTIONS 1-9: 0
SUM OF ALL RESPONSES TO PHQ QUESTIONS 1-9: 0
SUM OF ALL RESPONSES TO PHQ9 QUESTIONS 1 & 2: 0

## 2024-04-17 NOTE — PROGRESS NOTES
[facility] [place] [place] [address]  Phone: [phone] Fax [phone]  Caroline Olvera MD  4/17/2024           Ms. Donald  is a 83 y.o.  year old  female patient who comes in for follow-up on right arm and right hand pain.  She has been doing physical therapy and has had great improvement with her hand pain and with the movement of her thumb across her palmar surface of her hand.  This actually helped her upper arm to do the therapy on her hand.  She has been taking meloxicam also.  She noticed about a week after she started it she started breaking out in a rash.  First it was on the dorsal surface of the fingers of her left hand, then on her left chest, then underneath her left neck, and on her trunk as well.  Does not really hurt or itch.  It  starts out as small white dots that then become confluent and become red.  That is only thing that has changed.  All her other medicines she has been on for a long time.  She does need those refilled.      Interestingly she can take Advil without problems but it does make her sleepy.      .Ms. Donald  has  has a past medical history of Hypercholesteremia, Hypercholesterolemia, Hypertension, Other ill-defined conditions(329.52), and Thyroid disease.    Ms. Donald  has  has a past surgical history that includes Breast biopsy (Right, 2014); gyn; Cholecystectomy; lap,cholecystectomy (May 2010); and Breast surgery.    Ms. Donald   Current Outpatient Medications   Medication Sig Dispense Refill    hydroCHLOROthiazide (HYDRODIURIL) 25 MG tablet Take 0.5 tablets by mouth daily 90 tablet 3    levothyroxine (SYNTHROID) 75 MCG tablet Take 1 tablet by mouth every morning (before breakfast) 90 tablet 3    lovastatin (MEVACOR) 20 MG tablet Take 1 tablet by mouth nightly 90 tablet 3    meloxicam (MOBIC) 7.5 MG tablet Take 1 tablet by mouth daily 30 tablet 3     No current facility-administered medications for this visit.       Ms. Donald

## 2024-04-23 ENCOUNTER — HOSPITAL ENCOUNTER (OUTPATIENT)
Dept: PHYSICAL THERAPY | Age: 84
Setting detail: RECURRING SERIES
Discharge: HOME | End: 2024-04-26
Payer: MEDICARE

## 2024-04-23 PROCEDURE — 97110 THERAPEUTIC EXERCISES: CPT

## 2024-04-23 ASSESSMENT — PAIN SCALES - GENERAL: PAINLEVEL_OUTOF10: 0

## 2024-04-23 NOTE — PROGRESS NOTES
Activity/Exercise Parameters Parameters Parameters   UBE 3 minutes Forward, 3 minutes Backwards 3 minutes Forward, 3 minutes Backwards 3 minutes Forward, 3 minutes Backwards   Wrist Extension 2x10, red therabar    3x10, 5 lb kb, gripping ball 2x10, red therabar    x10, 5 lb kb, gripping ball 2x10, yellow therabar   Wrist Flexion 2x10, red therabar    3x10, 5 lb kb, gripping ball 2x10, red therabar    3x10, 5 lb kb, gripping ball 2x10, yellow therabar   Wrist Supination Yellow Therabar, 3x15 Red Therabar, 3x15    Wrist Pronation Yellow Therabar, 3x15 Red Therabar, 3x15    Radial Deviation X10, 7 lbs  3x10, Therabar with 2 lb weight   Thumb Extension X10 on plinth  X30, red theraband    Suitcase Carries 2x100', 15 lbs 2 laps grabbing end of 8 lbs DB          PROM  R wrist x 10 minutes each for flexion, extension and radial/ulnar deviation R wrist x 10 minutes each for flexion, extension and radial/ulnar deviation   Biceps Curl  3x10, blue 3x10, red band         Education Treatment, home exercise plan, plan of care, prognosis, pain modulation      MobileDevHQ Access Code: [email or web address]    Time spent with patient reviewing proper muscle recruitment and technique with exercises.       HEP: As above; handouts given to patient for all exercises.     Treatment/Session Summary:    Treatment Assessment:   See DC note from today   Communication/Consultation:  None today  Equipment provided today:  None  Recommendations/Intent for next treatment session: Patient to be discharged.    >Total Treatment Billable Duration: 45 minutes  Time In: 0902  Time Out: 0945    Robert Elizondo PT         Charge Capture  [employer] Portal  Appt Desk     Future Appointments   Date Time Provider Department Center   4/25/2024  4:30 PM Robert Elizondo PT [facility]   4/29/2024 10:00 AM Robert Elizondo PT [facility]   5/1/2024 11:00 AM Robert Elizondo PT [facility]   6/17/2024  8:45 AM [facility]   6/24/2024  8:45 AM [facility]

## 2024-04-23 NOTE — THERAPY DISCHARGE
Magy Donald  : 1940  Primary: Medicare Part A And B (Medicare)  Secondary: [employer]ARP HE[employer] C[employer] MEDICARE SUPP [facility] [place]  [address]a [address]  [facility] [address]  Phone: [phone]  Fax: [phone] Plan Frequency: Patient to be discharged    Plan of Care/Certification Expiration Date: 24        Plan of Care/Certification Expiration Date:  Plan of Care/Certification Expiration Date: 24    Frequency/Duration: Plan Frequency: Patient to be discharged      Time In/Out:   Time In: 902  Time Out: 945      PT Visit Info:         Visit Count:  4                OUTPATIENT PHYSICAL THERAPY:             Discharge Summary 2024               Episode (R Wrist Pain)         Treatment Diagnosis:     Right shoulder pain, unspecified chronicity  Pain in right wrist  Thumb pain, right  Medical/Referring Diagnosis:    Acute pain of right shoulder [M25.511]    Referring Physician:  Caroline Olvera MD MD Orders:  PT Eval and Treat   Return MD Appt:  TBD  Date of Onset:  Onset Date: 24 (Pain started about 3-4 months ago)     Allergies:  Patient has no known allergies.  Restrictions/Precautions:    None      Medications Last Reviewed:  2024       Assessment & Plan    OBJECTIVE       ROM Date:  2024    RIGHT LEFT   UEROM   Flexion WNL WNL   Extension WNL WNL   Abd WNL WNL   Elbow flexion WNL WNL   Elbow extension WNL WNL           SHOULDER STRENGTH: Date:  2024    RIGHT LEFT   Shoulder Flexion 4+ from 4 4+ from 4   Shoulder Extension 5 5   Shoulder Abduction 4+ from 4 4+ from 4   Shoulder IR 4+ 4+   Shoulder ER 4+ from 4- 4+ from 4     ELBOW WRIST STRENGTH: Date:  2024    RIGHT LEFT   FLEXION 4+ 5   EXTENSION 4+ 4+    STRENGTH 40 from 35 lbs.  33 from 25 lbs           ASSESSMENT   Progress Note 2024: Ms. Magy Donald has attended 5 physical therapy session including initial evaluation. Magy Donald reports their symptoms

## 2024-04-25 ENCOUNTER — APPOINTMENT (OUTPATIENT)
Dept: PHYSICAL THERAPY | Age: 84
End: 2024-04-25
Payer: MEDICARE

## 2024-04-29 ENCOUNTER — APPOINTMENT (OUTPATIENT)
Dept: PHYSICAL THERAPY | Age: 84
End: 2024-04-29
Payer: MEDICARE

## 2024-06-17 ENCOUNTER — NURSE ONLY (OUTPATIENT)
Dept: FAMILY MEDICINE CLINIC | Facility: CLINIC | Age: 84
End: 2024-06-17

## 2024-06-17 DIAGNOSIS — I10 ESSENTIAL HYPERTENSION, BENIGN: Primary | ICD-10-CM

## 2024-06-17 DIAGNOSIS — E78.2 MIXED HYPERLIPIDEMIA: ICD-10-CM

## 2024-06-17 DIAGNOSIS — E03.9 ACQUIRED HYPOTHYROIDISM: ICD-10-CM

## 2024-06-17 LAB
ALBUMIN SERPL-MCNC: 3.7 G/DL (ref 3.2–4.6)
ALBUMIN/GLOB SERPL: 1.2 (ref 1–1.9)
ALP SERPL-CCNC: 47 U/L (ref 35–104)
ALT SERPL-CCNC: 22 U/L (ref 12–65)
ANION GAP SERPL CALC-SCNC: 9 MMOL/L (ref 9–18)
AST SERPL-CCNC: 31 U/L (ref 15–37)
BASOPHILS # BLD: 0 K/UL (ref 0–0.2)
BASOPHILS NFR BLD: 1 % (ref 0–2)
BILIRUB SERPL-MCNC: 0.6 MG/DL (ref 0–1.2)
BUN SERPL-MCNC: 19 MG/DL (ref 8–23)
CALCIUM SERPL-MCNC: 9.7 MG/DL (ref 8.8–10.2)
CHLORIDE SERPL-SCNC: 103 MMOL/L (ref 98–107)
CHOLEST SERPL-MCNC: 155 MG/DL (ref 0–200)
CO2 SERPL-SCNC: 30 MMOL/L (ref 20–28)
CREAT SERPL-MCNC: 0.83 MG/DL (ref 0.6–1.1)
DIFFERENTIAL METHOD BLD: NORMAL
EOSINOPHIL # BLD: 0.1 K/UL (ref 0–0.8)
EOSINOPHIL NFR BLD: 2 % (ref 0.5–7.8)
ERYTHROCYTE [DISTWIDTH] IN BLOOD BY AUTOMATED COUNT: 12.7 % (ref 11.9–14.6)
GLOBULIN SER CALC-MCNC: 3 G/DL (ref 2.3–3.5)
GLUCOSE SERPL-MCNC: 109 MG/DL (ref 70–99)
HCT VFR BLD AUTO: 41.6 % (ref 35.8–46.3)
HDLC SERPL-MCNC: 45 MG/DL (ref 40–60)
HDLC SERPL: 3.5 (ref 0–5)
HGB BLD-MCNC: 13.8 G/DL (ref 11.7–15.4)
IMM GRANULOCYTES # BLD AUTO: 0 K/UL (ref 0–0.5)
IMM GRANULOCYTES NFR BLD AUTO: 0 % (ref 0–5)
LDLC SERPL CALC-MCNC: 85 MG/DL (ref 0–100)
LYMPHOCYTES # BLD: 1.6 K/UL (ref 0.5–4.6)
LYMPHOCYTES NFR BLD: 28 % (ref 13–44)
MCH RBC QN AUTO: 31.4 PG (ref 26.1–32.9)
MCHC RBC AUTO-ENTMCNC: 33.2 G/DL (ref 31.4–35)
MCV RBC AUTO: 94.8 FL (ref 82–102)
MONOCYTES # BLD: 0.6 K/UL (ref 0.1–1.3)
MONOCYTES NFR BLD: 11 % (ref 4–12)
NEUTS SEG # BLD: 3.2 K/UL (ref 1.7–8.2)
NEUTS SEG NFR BLD: 58 % (ref 43–78)
NRBC # BLD: 0 K/UL (ref 0–0.2)
PLATELET # BLD AUTO: 194 K/UL (ref 150–450)
PMV BLD AUTO: 11.2 FL (ref 9.4–12.3)
POTASSIUM SERPL-SCNC: 4.2 MMOL/L (ref 3.5–5.1)
PROT SERPL-MCNC: 6.7 G/DL (ref 6.3–8.2)
RBC # BLD AUTO: 4.39 M/UL (ref 4.05–5.2)
SODIUM SERPL-SCNC: 142 MMOL/L (ref 136–145)
TRIGL SERPL-MCNC: 127 MG/DL (ref 0–150)
TSH, 3RD GENERATION: 1.31 UIU/ML (ref 0.27–4.2)
VLDLC SERPL CALC-MCNC: 25 MG/DL (ref 6–23)
WBC # BLD AUTO: 5.6 K/UL (ref 4.3–11.1)

## 2024-06-21 SDOH — HEALTH STABILITY: PHYSICAL HEALTH: ON AVERAGE, HOW MANY DAYS PER WEEK DO YOU ENGAGE IN MODERATE TO STRENUOUS EXERCISE (LIKE A BRISK WALK)?: 2 DAYS

## 2024-06-21 SDOH — HEALTH STABILITY: PHYSICAL HEALTH: ON AVERAGE, HOW MANY MINUTES DO YOU ENGAGE IN EXERCISE AT THIS LEVEL?: 40 MIN

## 2024-06-21 ASSESSMENT — PATIENT HEALTH QUESTIONNAIRE - PHQ9
SUM OF ALL RESPONSES TO PHQ QUESTIONS 1-9: 0
1. LITTLE INTEREST OR PLEASURE IN DOING THINGS: NOT AT ALL
SUM OF ALL RESPONSES TO PHQ QUESTIONS 1-9: 0
2. FEELING DOWN, DEPRESSED OR HOPELESS: NOT AT ALL
SUM OF ALL RESPONSES TO PHQ9 QUESTIONS 1 & 2: 0

## 2024-06-21 ASSESSMENT — LIFESTYLE VARIABLES
HOW MANY STANDARD DRINKS CONTAINING ALCOHOL DO YOU HAVE ON A TYPICAL DAY: 1 OR 2
HOW OFTEN DO YOU HAVE SIX OR MORE DRINKS ON ONE OCCASION: 1
HOW MANY STANDARD DRINKS CONTAINING ALCOHOL DO YOU HAVE ON A TYPICAL DAY: 1
HOW OFTEN DO YOU HAVE A DRINK CONTAINING ALCOHOL: 2
HOW OFTEN DO YOU HAVE A DRINK CONTAINING ALCOHOL: MONTHLY OR LESS

## 2024-06-24 ENCOUNTER — OFFICE VISIT (OUTPATIENT)
Dept: FAMILY MEDICINE CLINIC | Facility: CLINIC | Age: 84
End: 2024-06-24
Payer: MEDICARE

## 2024-06-24 VITALS
DIASTOLIC BLOOD PRESSURE: 64 MMHG | HEART RATE: 70 BPM | WEIGHT: 145 LBS | SYSTOLIC BLOOD PRESSURE: 141 MMHG | RESPIRATION RATE: 16 BRPM | OXYGEN SATURATION: 96 % | HEIGHT: 61 IN | TEMPERATURE: 97.3 F | BODY MASS INDEX: 27.38 KG/M2

## 2024-06-24 DIAGNOSIS — M25.511 ACUTE PAIN OF RIGHT SHOULDER: ICD-10-CM

## 2024-06-24 DIAGNOSIS — E03.9 ACQUIRED HYPOTHYROIDISM: ICD-10-CM

## 2024-06-24 DIAGNOSIS — Z00.00 MEDICARE ANNUAL WELLNESS VISIT, SUBSEQUENT: Primary | ICD-10-CM

## 2024-06-24 DIAGNOSIS — E78.2 MIXED HYPERLIPIDEMIA: ICD-10-CM

## 2024-06-24 DIAGNOSIS — I10 PRIMARY HYPERTENSION: ICD-10-CM

## 2024-06-24 DIAGNOSIS — I10 ESSENTIAL HYPERTENSION, BENIGN: ICD-10-CM

## 2024-06-24 DIAGNOSIS — M79.641 HAND PAIN, RIGHT: ICD-10-CM

## 2024-06-24 PROCEDURE — 3077F SYST BP >= 140 MM HG: CPT | Performed by: FAMILY MEDICINE

## 2024-06-24 PROCEDURE — 3078F DIAST BP <80 MM HG: CPT | Performed by: FAMILY MEDICINE

## 2024-06-24 PROCEDURE — G0439 PPPS, SUBSEQ VISIT: HCPCS | Performed by: FAMILY MEDICINE

## 2024-06-24 PROCEDURE — 1123F ACP DISCUSS/DSCN MKR DOCD: CPT | Performed by: FAMILY MEDICINE

## 2024-06-24 RX ORDER — LEVOTHYROXINE SODIUM 0.07 MG/1
75 TABLET ORAL
Qty: 90 TABLET | Refills: 3 | Status: SHIPPED | OUTPATIENT
Start: 2024-06-24

## 2024-06-24 RX ORDER — LOVASTATIN 20 MG/1
20 TABLET ORAL NIGHTLY
Qty: 90 TABLET | Refills: 3 | Status: SHIPPED | OUTPATIENT
Start: 2024-06-24

## 2024-06-24 RX ORDER — HYDROCHLOROTHIAZIDE 25 MG/1
12.5 TABLET ORAL DAILY
Qty: 90 TABLET | Refills: 3 | Status: SHIPPED | OUTPATIENT
Start: 2024-06-24

## 2024-06-24 SDOH — ECONOMIC STABILITY: FOOD INSECURITY: WITHIN THE PAST 12 MONTHS, THE FOOD YOU BOUGHT JUST DIDN'T LAST AND YOU DIDN'T HAVE MONEY TO GET MORE.: NEVER TRUE

## 2024-06-24 SDOH — ECONOMIC STABILITY: FOOD INSECURITY: WITHIN THE PAST 12 MONTHS, YOU WORRIED THAT YOUR FOOD WOULD RUN OUT BEFORE YOU GOT MONEY TO BUY MORE.: NEVER TRUE

## 2024-06-24 SDOH — ECONOMIC STABILITY: INCOME INSECURITY: HOW HARD IS IT FOR YOU TO PAY FOR THE VERY BASICS LIKE FOOD, HOUSING, MEDICAL CARE, AND HEATING?: NOT HARD AT ALL

## 2024-06-24 NOTE — PROGRESS NOTES
Medicare Annual Wellness Visit    Magy Donald is here for Medicare AWV    Assessment & Plan   Medicare annual wellness visit, subsequent  Acquired hypothyroidism  -     levothyroxine (SYNTHROID) 75 MCG tablet; Take 1 tablet by mouth every morning (before breakfast), Disp-90 tablet, R-3Normal  Mixed hyperlipidemia  -     lovastatin (MEVACOR) 20 MG tablet; Take 1 tablet by mouth nightly, Disp-90 tablet, R-3Normal  Primary hypertension  Essential hypertension, benign  -     hydroCHLOROthiazide (HYDRODIURIL) 25 MG tablet; Take 0.5 tablets by mouth daily, Disp-90 tablet, R-3Normal  Hand pain, right  Acute pain of right shoulder  Recommendations for Preventive Services Due: see orders and patient instructions/AVS.  Recommended screening schedule for the next 5-10 years is provided to the patient in written form: see Patient Instructions/AVS.     Return in about 1 year (around 6/24/2025).     Subjective   The following acute and/or chronic problems were also addressed today:  Patient has hypertension and is well-controlled on hydrochlorothiazide.  She has high cholesterol and is well-controlled on lovastatin.  She also has hypothyroidism that is well-controlled on Synthroid.  She does try to stay active.    We do her blood work today and it was all good.  I did recommend she do the RSV vaccine tetanus when she can.    Patient's complete Health Risk Assessment and screening values have been reviewed and are found in Flowsheets. The following problems were reviewed today and where indicated follow up appointments were made and/or referrals ordered.    No Positive Risk Factors identified today.                                  Objective   Vitals:    06/24/24 0907   BP: (!) 141/64   Pulse: 70   Resp: 16   Temp: 97.3 °F (36.3 °C)   SpO2: 96%   Weight: 65.8 kg (145 lb)   Height: 1.549 m (5' 1\")      Body mass index is 27.4 kg/m².        Neck: neck supple and non tender without mass, no thyromegaly or thyroid nodules, no

## 2024-10-30 ENCOUNTER — OFFICE VISIT (OUTPATIENT)
Dept: FAMILY MEDICINE CLINIC | Facility: CLINIC | Age: 84
End: 2024-10-30

## 2024-10-30 VITALS
TEMPERATURE: 97.9 F | OXYGEN SATURATION: 94 % | WEIGHT: 151 LBS | BODY MASS INDEX: 28.51 KG/M2 | RESPIRATION RATE: 16 BRPM | DIASTOLIC BLOOD PRESSURE: 68 MMHG | HEART RATE: 79 BPM | HEIGHT: 61 IN | SYSTOLIC BLOOD PRESSURE: 164 MMHG

## 2024-10-30 DIAGNOSIS — I10 ESSENTIAL HYPERTENSION, BENIGN: ICD-10-CM

## 2024-10-30 DIAGNOSIS — K21.9 GASTROESOPHAGEAL REFLUX DISEASE WITHOUT ESOPHAGITIS: ICD-10-CM

## 2024-10-30 DIAGNOSIS — E03.9 ACQUIRED HYPOTHYROIDISM: ICD-10-CM

## 2024-10-30 DIAGNOSIS — R42 DIZZINESS: Primary | ICD-10-CM

## 2024-10-30 LAB
ALBUMIN SERPL-MCNC: 3.8 G/DL (ref 3.2–4.6)
ALBUMIN/GLOB SERPL: 1.2 (ref 1–1.9)
ALP SERPL-CCNC: 49 U/L (ref 35–104)
ALT SERPL-CCNC: 27 U/L (ref 8–45)
ANION GAP SERPL CALC-SCNC: 10 MMOL/L (ref 7–16)
AST SERPL-CCNC: 33 U/L (ref 15–37)
BILIRUB SERPL-MCNC: 0.3 MG/DL (ref 0–1.2)
BUN SERPL-MCNC: 18 MG/DL (ref 8–23)
CALCIUM SERPL-MCNC: 9.9 MG/DL (ref 8.8–10.2)
CHLORIDE SERPL-SCNC: 101 MMOL/L (ref 98–107)
CO2 SERPL-SCNC: 32 MMOL/L (ref 20–29)
CREAT SERPL-MCNC: 0.81 MG/DL (ref 0.6–1.1)
GLOBULIN SER CALC-MCNC: 3.1 G/DL (ref 2.3–3.5)
GLUCOSE SERPL-MCNC: 101 MG/DL (ref 70–99)
POTASSIUM SERPL-SCNC: 3.6 MMOL/L (ref 3.5–5.1)
PROT SERPL-MCNC: 6.9 G/DL (ref 6.3–8.2)
SODIUM SERPL-SCNC: 142 MMOL/L (ref 136–145)
TSH, 3RD GENERATION: 2.14 UIU/ML (ref 0.27–4.2)

## 2024-10-30 RX ORDER — MECLIZINE HYDROCHLORIDE 25 MG/1
25 TABLET ORAL 3 TIMES DAILY PRN
Qty: 30 TABLET | Refills: 0 | Status: SHIPPED | OUTPATIENT
Start: 2024-10-30 | End: 2024-11-09

## 2024-10-30 RX ORDER — OMEPRAZOLE 40 MG/1
40 CAPSULE, DELAYED RELEASE ORAL
Qty: 30 CAPSULE | Refills: 2 | Status: SHIPPED | OUTPATIENT
Start: 2024-10-30

## 2024-10-30 ASSESSMENT — PATIENT HEALTH QUESTIONNAIRE - PHQ9
SUM OF ALL RESPONSES TO PHQ QUESTIONS 1-9: 0
2. FEELING DOWN, DEPRESSED OR HOPELESS: NOT AT ALL
SUM OF ALL RESPONSES TO PHQ9 QUESTIONS 1 & 2: 0
SUM OF ALL RESPONSES TO PHQ QUESTIONS 1-9: 0
1. LITTLE INTEREST OR PLEASURE IN DOING THINGS: NOT AT ALL

## 2024-10-30 NOTE — PROGRESS NOTES
[facility] [place] [place] [address]  Phone: [phone] Fax [phone]  Caroline Olvera MD  10/30/2024           Ms. Donald  is a 83 y.o.  year old  female patient who comes in complaining of several week history of being dizzy.  She says when she lays back she will notice it but not when she turns over in bed.  When she looks up she will notice it.  Not when looking down.  She does not have ringing in her ears or hearing loss.  Several weeks ago she got very nauseous when she got up out of bed and she threw up and then she ended up just on the floor.  She is not sure if she passed out or what but her  noticed her there the next morning.  None of her medications have changed.  She does have hypertension and hypothyroidism and high cholesterol.  She said after that episode of vomiting she has noticed bile in her throat several times.  She does have heartburn and will occasionally take an over-the-counter Prilosec but she does not take it every day.  No one in her family has vertigo that she knows of.  Her blood pressure usually runs normal at home but she does not check it when these vertigo spells happen.      She did have her gallbladder removed some years ago and then subsequent to that she had a retained gallbladder stone that she had to have removed through ERCP.  She has not had pain in her right upper quadrant or any changes in her skin or eye color.    Ms. Donald  has  has a past medical history of Hypercholesteremia, Hypercholesterolemia, Hypertension, Other ill-defined conditions(019.78), and Thyroid disease.    Ms. Donald  has  has a past surgical history that includes Breast biopsy (Right, 2014); gyn; Cholecystectomy; lap,cholecystectomy (May 2010); and Breast surgery.    Ms. Donald   Current Outpatient Medications   Medication Sig Dispense Refill    meclizine (ANTIVERT) 25 MG tablet Take 1 tablet by mouth 3 times daily as needed for Dizziness 30 tablet

## 2024-12-04 NOTE — THERAPY EVALUATION
right rotation: 50 Extends, slight dizziness     Strength: Deep neck flexor: Poor    Special Tests:    Transverse ligament: (-)   Alar ligament: (-)   Koby's fracture: (-)   Natividad-Hallpike: (+) for R sided posterior canal   Roll Test: Not performed    Neurological Screen:  Myotomes: Key muscle strength testing for bilateral UE is WNL.  Dermatomes: Sensation testing through bilateral upper quadrants for light touch is WNL.     Oculomotor/Vestibular Coordination:   Saccade: Over/undershoots   Smooth Pursuit: Poor tracking R>L; worsens at higher speed, no dizziness   Vestibulo-ocular reflex: Intact, slight dizziness   Vestibulo-ocular reflex cancellation: Slight dizziness   Convergence: WNL    Outcome Measure:   Tool Used: Lower Extremity Functional Scale (LEFS)  Score:  Initial: 59/80 Most Recent: X/80 (Date: -- )   Interpretation of Score: 20 questions each scored on a 5 point scale with 0 representing \"extreme difficulty or unable to perform\" and 4 representing \"no difficulty\".  The lower the score, the greater the functional disability. 80/80 represents no disability.  Minimal detectable change is 9 points.    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 Examination this date reveals impaired oculomotor coordination, impaired posture, impaired cervical spine mobility/strength, and vestibular deficits that limit patients safety/independence. Patient is limited functionally with decreased tolerance to bed mobility, functional transfers, looking up, and ambulation. Patient would benefit from continued skilled therapy to address the above listed impairments and to maximize functional safety and independence.     Therapy Problem List: (Impacting functional limitations):    Decreased Strength, Decreased ROM, Decreased Transfer Abilities, Decreased Ambulation Ability/Technique, Decreased Functional Mobility, Decreased [place] with Home Exercise

## 2024-12-04 NOTE — PROGRESS NOTES
Magy Donald  : 1940  Primary: Medicare Part A And B (Medicare)  Secondary: AARP HEALTH CARE MEDICARE SUPP [facility] [place]  [address] A  [facility] [address]  Phone: [phone]  Fax: [phone] Plan Frequency: 1-2x per week for up to 12 weeks  Plan of Care/Certification Expiration Date: 25        Plan of Care/Certification Expiration Date:  Plan of Care/Certification Expiration Date: 25    Frequency/Duration: Plan Frequency: 1-2x per week for up to 12 weeks      Time In/Out:   Time In: 854  Time Out: 942      PT Visit Info:         Visit Count:  1    OUTPATIENT PHYSICAL THERAPY:   Treatment Note 12/10/2024       Episode  (Dizziness)               Treatment Diagnosis:    Dizziness and giddiness  Other abnormalities of gait and mobility  Abnormality of gait due to impairment of balance  Medical/Referring Diagnosis:    Dizziness [R42]    Referring Physician:  Caroline Olvera MD MD Orders:  PT Eval and Treat   Return MD Appt:  TBD   Date of Onset:  Onset Date: 24 (Pain started about 3-4 months ago)     Allergies:   Meloxicam  Restrictions/Precautions:   Fall Precautions: Dizziness, Fall History      Interventions Planned (Treatment may consist of any combination of the following):     See Assessment Note    Subjective Comments:   Patient would like to reduce her dizziness and improve her neck motion.   Initial Pain Level::     2/10  Post Session Pain Level:       2/10  Medications Last Reviewed:  12/10/2024  Updated Objective Findings:  See Evaluation Note from today  Treatment   THERAPEUTIC EXERCISE: (24 minutes):    Exercises per grid below to improve mobility, strength, balance, and coordination.  Required moderate visual, verbal, manual, and tactile cues to promote proper body alignment, promote proper body posture, and promote proper body mechanics.  Progressed resistance, range, repetitions, and complexity of movement as

## 2024-12-10 ENCOUNTER — HOSPITAL ENCOUNTER (OUTPATIENT)
Dept: PHYSICAL THERAPY | Age: 84
Setting detail: RECURRING SERIES
Discharge: HOME | End: 2024-12-13
Attending: FAMILY MEDICINE
Payer: MEDICARE

## 2024-12-10 DIAGNOSIS — R26.89 OTHER ABNORMALITIES OF GAIT AND MOBILITY: ICD-10-CM

## 2024-12-10 DIAGNOSIS — R42 DIZZINESS AND GIDDINESS: Primary | ICD-10-CM

## 2024-12-10 DIAGNOSIS — R26.89 ABNORMALITY OF GAIT DUE TO IMPAIRMENT OF BALANCE: ICD-10-CM

## 2024-12-10 PROCEDURE — 97110 THERAPEUTIC EXERCISES: CPT

## 2024-12-10 PROCEDURE — 97161 PT EVAL LOW COMPLEX 20 MIN: CPT

## 2024-12-10 ASSESSMENT — PAIN SCALES - GENERAL: PAINLEVEL_OUTOF10: 2

## 2024-12-10 NOTE — PROGRESS NOTES
Magy PETTIT Odilon  : 1940  Primary: Medicare Part A And B (Medicare)  Secondary: AARP HEALTH CARE MEDICARE SUPP [facility] [place]  Ana [address] A  [facility] [address]  Phone: [phone]  Fax: [phone] Plan Frequency: 1-2x per week for up to 12 weeks  Plan of Care/Certification Expiration Date: 25        Plan of Care/Certification Expiration Date:  Plan of Care/Certification Expiration Date: 25    Frequency/Duration: Plan Frequency: 1-2x per week for up to 12 weeks      Time In/Out:   Time In: 1327  Time Out: 1407      PT Visit Info:         Visit Count:  2    OUTPATIENT PHYSICAL THERAPY:   Treatment Note 2024       Episode  (Dizziness)               Treatment Diagnosis:    Dizziness and giddiness  Other abnormalities of gait and mobility  Abnormality of gait due to impairment of balance  Medical/Referring Diagnosis:    Dizziness [R42]    Referring Physician:  Caroline Olvera MD MD Orders:  PT Eval and Treat   Return MD Appt:  TBD   Date of Onset:  Onset Date: 10/01/24 (sat up in bed, very nauseated, emesis and fall to ground)     Allergies:   Meloxicam  Restrictions/Precautions:   Fall Precautions: Dizziness, Fall History      Interventions Planned (Treatment may consist of any combination of the following):     See Assessment Note    Subjective Comments:   Patient reports after she was here Wednesday she got sick again after getting home. She also went grocery shopping and felt dizzy during that. She also felt some brain fog. She also has always been a bowler and did fairly well. She also noted her score on bowling has been steadily decreasing over the past 2 months.   Initial Pain Level::     1/10  Post Session Pain Level:       1/10  Medications Last Reviewed:  2024  Updated Objective Findings:    strength R 48 lbs L 39 lbs; maxx Rhodeske (-)  Treatment   THERAPEUTIC EXERCISE: (39 minutes):    Exercises per grid below to improve

## 2024-12-13 ENCOUNTER — HOSPITAL ENCOUNTER (OUTPATIENT)
Dept: PHYSICAL THERAPY | Age: 84
Setting detail: RECURRING SERIES
Discharge: HOME | End: 2024-12-16
Attending: FAMILY MEDICINE
Payer: MEDICARE

## 2024-12-13 PROCEDURE — 97110 THERAPEUTIC EXERCISES: CPT

## 2024-12-13 ASSESSMENT — PAIN SCALES - GENERAL: PAINLEVEL_OUTOF10: 1

## 2024-12-17 NOTE — PROGRESS NOTES
Magy K Odilon  : 1940  Primary: Medicare Part A And B (Medicare)  Secondary: [employer] MEDICARE [facility]PP [facility] [place]  Ana [address]NEY A  [facility] [address]  Phone: [phone]  Fax: [phone] Plan Frequency: 1-2x per week for up to 12 weeks  Plan of Care/Certification Expiration Date: 25        Plan of Care/Certification Expiration Date:  Plan of Care/Certification Expiration Date: 25    Frequency/Duration: Plan Frequency: 1-2x per week for up to 12 weeks      Time In/Out:   Time In: 1325  Time Out: 1405      PT Visit Info:         Visit Count:  3    OUTPATIENT PHYSICAL THERAPY:   Treatment Note 2024       Episode  (Dizziness)               Treatment Diagnosis:    Dizziness and giddiness  Other abnormalities of gait and mobility  Abnormality of gait due to impairment of balance  Medical/Referring Diagnosis:    Dizziness [R42]    Referring Physician:  Caroline Olvera MD MD Orders:  PT Eval and Treat   Return MD Appt:  TBD   Date of Onset:  Onset Date: 10/01/24 (sat up in bed, very nauseated, emesis and fall to ground)     Allergies:   Meloxicam  Restrictions/Precautions:   Fall Precautions: Dizziness, Fall History      Interventions Planned (Treatment may consist of any combination of the following):     See Assessment Note    Subjective Comments:   Patient reports she has not had any spinning or dizziness since last week, but she has had a tight/stiff feeling in her neck.   Initial Pain Level::     3/10  Post Session Pain Level:       2/10  Medications Last Reviewed:  2024  Updated Objective Findings:   maxx [place] (-); /68  Treatment   THERAPEUTIC EXERCISE: (30 minutes):    Exercises per grid below to improve mobility, strength, balance, and coordination.  Required moderate visual, verbal, manual, and tactile cues to promote proper body alignment, promote proper body posture, and promote proper body mechanics.

## 2024-12-20 ENCOUNTER — HOSPITAL ENCOUNTER (OUTPATIENT)
Dept: PHYSICAL THERAPY | Age: 84
Setting detail: RECURRING SERIES
Discharge: HOME | End: 2024-12-23
Attending: FAMILY MEDICINE
Payer: MEDICARE

## 2024-12-20 PROCEDURE — 97110 THERAPEUTIC EXERCISES: CPT

## 2024-12-20 PROCEDURE — 97140 MANUAL THERAPY 1/> REGIONS: CPT

## 2024-12-20 ASSESSMENT — PAIN SCALES - GENERAL: PAINLEVEL_OUTOF10: 3

## 2024-12-20 NOTE — PROGRESS NOTES
Magy Donald  : 1940  Primary: Medicare Part A And B (Medicare)  Secondary: [employer] MEDICARE SUPP [facility] [facility] [place]  Ana MARTINS  [facility] [address]  Phone: [phone]  Fax: [phone] Plan Frequency: 1-2x per week for up to 12 weeks  Plan of Care/Certification Expiration Date: 25        Plan of Care/Certification Expiration Date:  Plan of Care/Certification Expiration Date: 25    Frequency/Duration: Plan Frequency: 1-2x per week for up to 12 weeks      Time In/Out:   Time In: 1225  Time Out: 1251      PT Visit Info:         Visit Count:  4    OUTPATIENT PHYSICAL THERAPY:   Treatment Note 2024       Episode  (Dizziness)               Treatment Diagnosis:    Dizziness and giddiness  Other abnormalities of gait and mobility  Abnormality of gait due to impairment of balance  Medical/Referring Diagnosis:    Dizziness [R42]    Referring Physician:  Caroline Olvera MD MD Orders:  PT Eval and Treat   Return MD Appt:  TBD   Date of Onset:  Onset Date: 10/01/24 (sat up in bed, very nauseated, emesis and fall to ground)     Allergies:   Meloxicam  Restrictions/Precautions:   Fall Precautions: Dizziness, Fall History      Interventions Planned (Treatment may consist of any combination of the following):     See Assessment Note    Subjective Comments:   Patient reports she had a couple days when she felt off/icky and ended up vomiting one day. She did track her BP and it was higher that day than normal.   Initial Pain Level::     0/10  Post Session Pain Level:       0/10  Medications Last Reviewed:  2024  Updated Objective Findings:   maxx [place] (-); /68  Treatment   THERAPEUTIC EXERCISE: (24 minutes):    Exercises per grid below to improve mobility, strength, balance, and coordination.  Required moderate visual, verbal, manual, and tactile cues to promote proper body alignment, promote proper body posture, and promote

## 2024-12-27 ENCOUNTER — HOSPITAL ENCOUNTER (OUTPATIENT)
Dept: PHYSICAL THERAPY | Age: 84
Setting detail: RECURRING SERIES
Discharge: HOME | End: 2024-12-30
Attending: FAMILY MEDICINE
Payer: MEDICARE

## 2024-12-27 PROCEDURE — 97110 THERAPEUTIC EXERCISES: CPT

## 2024-12-27 ASSESSMENT — PAIN SCALES - GENERAL: PAINLEVEL_OUTOF10: 0

## 2025-01-03 ASSESSMENT — PATIENT HEALTH QUESTIONNAIRE - PHQ9
SUM OF ALL RESPONSES TO PHQ9 QUESTIONS 1 & 2: 0
SUM OF ALL RESPONSES TO PHQ QUESTIONS 1-9: 0
1. LITTLE INTEREST OR PLEASURE IN DOING THINGS: NOT AT ALL
SUM OF ALL RESPONSES TO PHQ QUESTIONS 1-9: 0
2. FEELING DOWN, DEPRESSED OR HOPELESS: NOT AT ALL
SUM OF ALL RESPONSES TO PHQ QUESTIONS 1-9: 0
SUM OF ALL RESPONSES TO PHQ9 QUESTIONS 1 & 2: 0
2. FEELING DOWN, DEPRESSED OR HOPELESS: NOT AT ALL
1. LITTLE INTEREST OR PLEASURE IN DOING THINGS: NOT AT ALL
SUM OF ALL RESPONSES TO PHQ QUESTIONS 1-9: 0

## 2025-01-06 ENCOUNTER — OFFICE VISIT (OUTPATIENT)
Dept: FAMILY MEDICINE CLINIC | Facility: CLINIC | Age: 85
End: 2025-01-06
Payer: MEDICARE

## 2025-01-06 VITALS
BODY MASS INDEX: 28.09 KG/M2 | SYSTOLIC BLOOD PRESSURE: 153 MMHG | RESPIRATION RATE: 16 BRPM | HEIGHT: 61 IN | TEMPERATURE: 97.8 F | DIASTOLIC BLOOD PRESSURE: 60 MMHG | WEIGHT: 148.8 LBS | OXYGEN SATURATION: 94 % | HEART RATE: 63 BPM

## 2025-01-06 DIAGNOSIS — I10 ESSENTIAL HYPERTENSION, BENIGN: Primary | ICD-10-CM

## 2025-01-06 DIAGNOSIS — H81.13 BENIGN PAROXYSMAL POSITIONAL VERTIGO DUE TO BILATERAL VESTIBULAR DISORDER: ICD-10-CM

## 2025-01-06 DIAGNOSIS — K21.9 GASTROESOPHAGEAL REFLUX DISEASE WITHOUT ESOPHAGITIS: ICD-10-CM

## 2025-01-06 PROCEDURE — G8427 DOCREV CUR MEDS BY ELIG CLIN: HCPCS | Performed by: FAMILY MEDICINE

## 2025-01-06 PROCEDURE — 1123F ACP DISCUSS/DSCN MKR DOCD: CPT | Performed by: FAMILY MEDICINE

## 2025-01-06 PROCEDURE — 3077F SYST BP >= 140 MM HG: CPT | Performed by: FAMILY MEDICINE

## 2025-01-06 PROCEDURE — 1090F PRES/ABSN URINE INCON ASSESS: CPT | Performed by: FAMILY MEDICINE

## 2025-01-06 PROCEDURE — 1126F AMNT PAIN NOTED NONE PRSNT: CPT | Performed by: FAMILY MEDICINE

## 2025-01-06 PROCEDURE — 1159F MED LIST DOCD IN RCRD: CPT | Performed by: FAMILY MEDICINE

## 2025-01-06 PROCEDURE — 3078F DIAST BP <80 MM HG: CPT | Performed by: FAMILY MEDICINE

## 2025-01-06 PROCEDURE — 1036F TOBACCO NON-USER: CPT | Performed by: FAMILY MEDICINE

## 2025-01-06 PROCEDURE — 99214 OFFICE O/P EST MOD 30 MIN: CPT | Performed by: FAMILY MEDICINE

## 2025-01-06 PROCEDURE — M1308 PR FLU IMMUNIZE NO ADMIN: HCPCS | Performed by: FAMILY MEDICINE

## 2025-01-06 PROCEDURE — 1160F RVW MEDS BY RX/DR IN RCRD: CPT | Performed by: FAMILY MEDICINE

## 2025-01-06 PROCEDURE — G8419 CALC BMI OUT NRM PARAM NOF/U: HCPCS | Performed by: FAMILY MEDICINE

## 2025-01-06 PROCEDURE — G8399 PT W/DXA RESULTS DOCUMENT: HCPCS | Performed by: FAMILY MEDICINE

## 2025-01-06 RX ORDER — OLMESARTAN MEDOXOMIL 5 MG/1
5 TABLET ORAL DAILY
Qty: 90 TABLET | Refills: 1 | Status: SHIPPED | OUTPATIENT
Start: 2025-01-06

## 2025-01-06 NOTE — PROGRESS NOTES
[facility] [place] [place] [address]  Phone: [phone] Fax [phone]  Caroline Olvera MD  1/6/2025           Ms. Donald  is a 84 y.o.  year old  female patient who comes in to follow up on several issues.    We had seen her about a month ago for dizziness related to benign positional vertigo.  She had to stop taking the meclizine because it made her so sleepy.  She did do vestibular rehab and it did make her vomit but it did help her dizziness and she is now much better.  She is doing maintenance exercises at home.      She also was having trouble with reflux and we put her on Prilosec.  She is much better and is taking it only as needed now.      Her blood pressure has been running high at home.  There have been days when has run 180s over 80s.  Laboratory work was normal a month ago.  She does take hydrochlorothiazide 12.5 mg daily.           Ms. Donald  has  has a past medical history of Hypercholesteremia, Hypercholesterolemia, Hypertension, Other ill-defined conditions(799.89), and Thyroid disease.    Ms. Donald  has  has a past surgical history that includes Breast biopsy (Right, 2014); gyn; Cholecystectomy; lap,cholecystectomy (May 2010); and Breast surgery.    Ms. Donald   Current Outpatient Medications   Medication Sig Dispense Refill    olmesartan (BENICAR) 5 MG tablet Take 1 tablet by mouth daily 90 tablet 1    omeprazole (PRILOSEC) 40 MG delayed release capsule Take 1 capsule by mouth every morning (before breakfast) 30 capsule 2    hydroCHLOROthiazide (HYDRODIURIL) 25 MG tablet Take 0.5 tablets by mouth daily 90 tablet 3    lovastatin (MEVACOR) 20 MG tablet Take 1 tablet by mouth nightly 90 tablet 3    levothyroxine (SYNTHROID) 75 MCG tablet Take 1 tablet by mouth every morning (before breakfast) 90 tablet 3     No current facility-administered medications for this visit.       Ms. Donald   Social History     Socioeconomic History    Marital status:

## 2025-03-25 DIAGNOSIS — E78.2 MIXED HYPERLIPIDEMIA: ICD-10-CM

## 2025-03-26 RX ORDER — LOVASTATIN 20 MG/1
20 TABLET ORAL NIGHTLY
Qty: 90 TABLET | Refills: 0 | Status: SHIPPED | OUTPATIENT
Start: 2025-03-26

## 2025-05-16 ENCOUNTER — OFFICE VISIT (OUTPATIENT)
Dept: FAMILY MEDICINE CLINIC | Facility: CLINIC | Age: 85
End: 2025-05-16

## 2025-05-16 VITALS
RESPIRATION RATE: 16 BRPM | SYSTOLIC BLOOD PRESSURE: 146 MMHG | WEIGHT: 145.6 LBS | HEART RATE: 70 BPM | HEIGHT: 61 IN | OXYGEN SATURATION: 95 % | DIASTOLIC BLOOD PRESSURE: 57 MMHG | BODY MASS INDEX: 27.49 KG/M2 | TEMPERATURE: 97.9 F

## 2025-05-16 DIAGNOSIS — M79.644 PAIN OF RIGHT MIDDLE FINGER: Primary | ICD-10-CM

## 2025-05-16 RX ORDER — NAPROXEN 500 MG/1
500 TABLET ORAL 2 TIMES DAILY WITH MEALS
Qty: 30 TABLET | Refills: 1 | Status: SHIPPED | OUTPATIENT
Start: 2025-05-16

## 2025-05-16 SDOH — ECONOMIC STABILITY: FOOD INSECURITY: WITHIN THE PAST 12 MONTHS, THE FOOD YOU BOUGHT JUST DIDN'T LAST AND YOU DIDN'T HAVE MONEY TO GET MORE.: NEVER TRUE

## 2025-05-16 SDOH — ECONOMIC STABILITY: TRANSPORTATION INSECURITY
IN THE PAST 12 MONTHS, HAS THE LACK OF TRANSPORTATION KEPT YOU FROM MEDICAL APPOINTMENTS OR FROM GETTING MEDICATIONS?: NO

## 2025-05-16 SDOH — ECONOMIC STABILITY: INCOME INSECURITY: IN THE LAST 12 MONTHS, WAS THERE A TIME WHEN YOU WERE NOT ABLE TO PAY THE MORTGAGE OR RENT ON TIME?: NO

## 2025-05-16 SDOH — ECONOMIC STABILITY: FOOD INSECURITY: WITHIN THE PAST 12 MONTHS, YOU WORRIED THAT YOUR FOOD WOULD RUN OUT BEFORE YOU GOT MONEY TO BUY MORE.: NEVER TRUE

## 2025-05-16 ASSESSMENT — PATIENT HEALTH QUESTIONNAIRE - PHQ9
SUM OF ALL RESPONSES TO PHQ QUESTIONS 1-9: 0
1. LITTLE INTEREST OR PLEASURE IN DOING THINGS: NOT AT ALL
2. FEELING DOWN, DEPRESSED OR HOPELESS: NOT AT ALL
SUM OF ALL RESPONSES TO PHQ QUESTIONS 1-9: 0

## 2025-05-16 NOTE — PROGRESS NOTES
[facility] [address] [address]  Phone [phone]      Patient: Magy Donald  YOB: 1940  Age 84 y.o.  Sex female  Medical Record:  [id number]  Visit Date: 05/16/25  Author:  Jose De Jesus Clinton PA-C    [facility] Note    Chief Complaint   Patient presents with    Finger Pain     Right hand middle finger  Anytime she puts stress on it, it hurts per pt       History of Present Illness  History of Present Illness  The patient is an 84-year-old female who presents for evaluation of pain in her index and middle finger.    She reports experiencing intermittent shooting pain in the middle finger of her right hand, which radiates down into the palm of her hand. This pain is occasionally accompanied by bruising. She also notes that her finger sometimes catches, although this occurs infrequently. The onset of these symptoms was approximately one month ago, following a period of pulling weeds but denies any injury or trauma to the hand/finger. She has since discontinued this activity but she is continuing to have intermittent discomfort. She is not currently using any anti-inflammatory medications but has previously taken Aleve in the past without any adverse effects. She is right-hand dominant.  Denies numbness, tingling or weakness of the hand or fingers.  Pain is exacerbated by gripping objects with the right hand denies swelling of the hand or finger.      Past History:    Past Medical history   Past Medical History:   Diagnosis Date    Hypercholesteremia     Hypercholesterolemia     Hypertension     controlled with med    Other ill-defined conditions(399.89)     high cholesterol    Thyroid disease        Current Problem List:   Patient Active Problem List   Diagnosis    Hypothyroid    Numbness and tingling of foot    Hyperlipidemia    Overweight    Neuropathic pain of ankle, right    Primary hypertension       Current Medications: .  Current Outpatient

## 2025-05-16 NOTE — PATIENT INSTRUCTIONS
*Recommend using an immobilizer on the finger or at least chico taping the finger with the one next to it during the day.  *Try the antiinflammatory (Naprosyn) - take twice a day with food.  *Ice the area, for 10-15 minutes, 2-3 times a day as able.  *Return for any worsening or persistent symptoms.

## 2025-06-18 ENCOUNTER — LAB (OUTPATIENT)
Dept: FAMILY MEDICINE CLINIC | Facility: CLINIC | Age: 85
End: 2025-06-18

## 2025-06-18 ENCOUNTER — TELEPHONE (OUTPATIENT)
Dept: FAMILY MEDICINE CLINIC | Facility: CLINIC | Age: 85
End: 2025-06-18

## 2025-06-18 DIAGNOSIS — E03.9 ACQUIRED HYPOTHYROIDISM: ICD-10-CM

## 2025-06-18 DIAGNOSIS — E78.2 MIXED HYPERLIPIDEMIA: ICD-10-CM

## 2025-06-18 DIAGNOSIS — I10 PRIMARY HYPERTENSION: Primary | ICD-10-CM

## 2025-06-18 DIAGNOSIS — I10 PRIMARY HYPERTENSION: ICD-10-CM

## 2025-06-18 LAB
ALBUMIN SERPL-MCNC: 3.6 G/DL (ref 3.2–4.6)
ALBUMIN/GLOB SERPL: 1.1 (ref 1–1.9)
ALP SERPL-CCNC: 50 U/L (ref 35–104)
ALT SERPL-CCNC: 31 U/L (ref 8–45)
ANION GAP SERPL CALC-SCNC: 10 MMOL/L (ref 7–16)
AST SERPL-CCNC: 42 U/L (ref 15–37)
BASOPHILS # BLD: 0.06 K/UL (ref 0–0.2)
BASOPHILS NFR BLD: 1 % (ref 0–2)
BILIRUB SERPL-MCNC: 0.6 MG/DL (ref 0–1.2)
BUN SERPL-MCNC: 18 MG/DL (ref 8–23)
CALCIUM SERPL-MCNC: 9.9 MG/DL (ref 8.8–10.2)
CHLORIDE SERPL-SCNC: 101 MMOL/L (ref 98–107)
CHOLEST SERPL-MCNC: 153 MG/DL (ref 0–200)
CO2 SERPL-SCNC: 31 MMOL/L (ref 20–29)
CREAT SERPL-MCNC: 0.88 MG/DL (ref 0.6–1.1)
DIFFERENTIAL METHOD BLD: ABNORMAL
EOSINOPHIL # BLD: 0.16 K/UL (ref 0–0.8)
EOSINOPHIL NFR BLD: 2.7 % (ref 0.5–7.8)
ERYTHROCYTE [DISTWIDTH] IN BLOOD BY AUTOMATED COUNT: 12.8 % (ref 11.9–14.6)
GLOBULIN SER CALC-MCNC: 3.3 G/DL (ref 2.3–3.5)
GLUCOSE SERPL-MCNC: 107 MG/DL (ref 70–99)
HCT VFR BLD AUTO: 41.2 % (ref 35.8–46.3)
HDLC SERPL-MCNC: 41 MG/DL (ref 40–60)
HDLC SERPL: 3.7 (ref 0–5)
HGB BLD-MCNC: 14 G/DL (ref 11.7–15.4)
IMM GRANULOCYTES # BLD AUTO: 0.03 K/UL (ref 0–0.5)
IMM GRANULOCYTES NFR BLD AUTO: 0.5 % (ref 0–5)
LDLC SERPL CALC-MCNC: 86 MG/DL (ref 0–100)
LYMPHOCYTES # BLD: 1.83 K/UL (ref 0.5–4.6)
LYMPHOCYTES NFR BLD: 30.5 % (ref 13–44)
MCH RBC QN AUTO: 31.8 PG (ref 26.1–32.9)
MCHC RBC AUTO-ENTMCNC: 34 G/DL (ref 31.4–35)
MCV RBC AUTO: 93.6 FL (ref 82–102)
MONOCYTES # BLD: 0.79 K/UL (ref 0.1–1.3)
MONOCYTES NFR BLD: 13.2 % (ref 4–12)
NEUTS SEG # BLD: 3.13 K/UL (ref 1.7–8.2)
NEUTS SEG NFR BLD: 52.1 % (ref 43–78)
NRBC # BLD: 0 K/UL (ref 0–0.2)
PLATELET # BLD AUTO: 195 K/UL (ref 150–450)
PMV BLD AUTO: 11.7 FL (ref 9.4–12.3)
POTASSIUM SERPL-SCNC: 3.8 MMOL/L (ref 3.5–5.1)
PROT SERPL-MCNC: 6.8 G/DL (ref 6.3–8.2)
RBC # BLD AUTO: 4.4 M/UL (ref 4.05–5.2)
SODIUM SERPL-SCNC: 142 MMOL/L (ref 136–145)
TRIGL SERPL-MCNC: 130 MG/DL (ref 0–150)
TSH, 3RD GENERATION: 1.56 UIU/ML (ref 0.27–4.2)
VLDLC SERPL CALC-MCNC: 26 MG/DL (ref 6–23)
WBC # BLD AUTO: 6 K/UL (ref 4.3–11.1)

## 2025-06-23 SDOH — HEALTH STABILITY: PHYSICAL HEALTH: ON AVERAGE, HOW MANY MINUTES DO YOU ENGAGE IN EXERCISE AT THIS LEVEL?: 40 MIN

## 2025-06-23 SDOH — HEALTH STABILITY: PHYSICAL HEALTH: ON AVERAGE, HOW MANY DAYS PER WEEK DO YOU ENGAGE IN MODERATE TO STRENUOUS EXERCISE (LIKE A BRISK WALK)?: 3 DAYS

## 2025-06-23 ASSESSMENT — PATIENT HEALTH QUESTIONNAIRE - PHQ9
1. LITTLE INTEREST OR PLEASURE IN DOING THINGS: NOT AT ALL
SUM OF ALL RESPONSES TO PHQ QUESTIONS 1-9: 0
2. FEELING DOWN, DEPRESSED OR HOPELESS: NOT AT ALL

## 2025-06-23 ASSESSMENT — LIFESTYLE VARIABLES
HOW OFTEN DO YOU HAVE A DRINK CONTAINING ALCOHOL: 2
HOW OFTEN DO YOU HAVE A DRINK CONTAINING ALCOHOL: MONTHLY OR LESS
HOW OFTEN DO YOU HAVE SIX OR MORE DRINKS ON ONE OCCASION: 1
HOW MANY STANDARD DRINKS CONTAINING ALCOHOL DO YOU HAVE ON A TYPICAL DAY: 1 OR 2
HOW MANY STANDARD DRINKS CONTAINING ALCOHOL DO YOU HAVE ON A TYPICAL DAY: 1

## 2025-06-25 ENCOUNTER — OFFICE VISIT (OUTPATIENT)
Dept: FAMILY MEDICINE CLINIC | Facility: CLINIC | Age: 85
End: 2025-06-25
Payer: MEDICARE

## 2025-06-25 VITALS
OXYGEN SATURATION: 96 % | BODY MASS INDEX: 28.13 KG/M2 | HEIGHT: 61 IN | DIASTOLIC BLOOD PRESSURE: 64 MMHG | RESPIRATION RATE: 16 BRPM | WEIGHT: 149 LBS | SYSTOLIC BLOOD PRESSURE: 150 MMHG | HEART RATE: 73 BPM | TEMPERATURE: 98.2 F

## 2025-06-25 DIAGNOSIS — I10 ESSENTIAL HYPERTENSION, BENIGN: ICD-10-CM

## 2025-06-25 DIAGNOSIS — E78.2 MIXED HYPERLIPIDEMIA: ICD-10-CM

## 2025-06-25 DIAGNOSIS — K21.9 GASTROESOPHAGEAL REFLUX DISEASE WITHOUT ESOPHAGITIS: ICD-10-CM

## 2025-06-25 DIAGNOSIS — Z00.00 MEDICARE ANNUAL WELLNESS VISIT, SUBSEQUENT: Primary | ICD-10-CM

## 2025-06-25 DIAGNOSIS — E03.9 ACQUIRED HYPOTHYROIDISM: ICD-10-CM

## 2025-06-25 DIAGNOSIS — M79.644 PAIN OF RIGHT MIDDLE FINGER: ICD-10-CM

## 2025-06-25 PROCEDURE — 1090F PRES/ABSN URINE INCON ASSESS: CPT | Performed by: FAMILY MEDICINE

## 2025-06-25 PROCEDURE — 1036F TOBACCO NON-USER: CPT | Performed by: FAMILY MEDICINE

## 2025-06-25 PROCEDURE — 1126F AMNT PAIN NOTED NONE PRSNT: CPT | Performed by: FAMILY MEDICINE

## 2025-06-25 PROCEDURE — 3077F SYST BP >= 140 MM HG: CPT | Performed by: FAMILY MEDICINE

## 2025-06-25 PROCEDURE — G8399 PT W/DXA RESULTS DOCUMENT: HCPCS | Performed by: FAMILY MEDICINE

## 2025-06-25 PROCEDURE — G0439 PPPS, SUBSEQ VISIT: HCPCS | Performed by: FAMILY MEDICINE

## 2025-06-25 PROCEDURE — 1123F ACP DISCUSS/DSCN MKR DOCD: CPT | Performed by: FAMILY MEDICINE

## 2025-06-25 PROCEDURE — 99214 OFFICE O/P EST MOD 30 MIN: CPT | Performed by: FAMILY MEDICINE

## 2025-06-25 PROCEDURE — 1159F MED LIST DOCD IN RCRD: CPT | Performed by: FAMILY MEDICINE

## 2025-06-25 PROCEDURE — G2211 COMPLEX E/M VISIT ADD ON: HCPCS | Performed by: FAMILY MEDICINE

## 2025-06-25 PROCEDURE — 3078F DIAST BP <80 MM HG: CPT | Performed by: FAMILY MEDICINE

## 2025-06-25 PROCEDURE — 1160F RVW MEDS BY RX/DR IN RCRD: CPT | Performed by: FAMILY MEDICINE

## 2025-06-25 PROCEDURE — G8427 DOCREV CUR MEDS BY ELIG CLIN: HCPCS | Performed by: FAMILY MEDICINE

## 2025-06-25 PROCEDURE — G8419 CALC BMI OUT NRM PARAM NOF/U: HCPCS | Performed by: FAMILY MEDICINE

## 2025-06-25 RX ORDER — LOVASTATIN 20 MG/1
20 TABLET ORAL NIGHTLY
Qty: 90 TABLET | Refills: 3 | Status: SHIPPED | OUTPATIENT
Start: 2025-06-25

## 2025-06-25 RX ORDER — NAPROXEN 500 MG/1
500 TABLET ORAL 2 TIMES DAILY WITH MEALS
Qty: 30 TABLET | Refills: 1 | Status: SHIPPED | OUTPATIENT
Start: 2025-06-25

## 2025-06-25 RX ORDER — LEVOTHYROXINE SODIUM 75 UG/1
75 TABLET ORAL
Qty: 90 TABLET | Refills: 3 | Status: SHIPPED | OUTPATIENT
Start: 2025-06-25

## 2025-06-25 RX ORDER — OMEPRAZOLE 40 MG/1
40 CAPSULE, DELAYED RELEASE ORAL
Qty: 30 CAPSULE | Refills: 2 | Status: CANCELLED | OUTPATIENT
Start: 2025-06-25

## 2025-06-25 RX ORDER — HYDROCHLOROTHIAZIDE 25 MG/1
12.5 TABLET ORAL DAILY
Qty: 90 TABLET | Refills: 3 | Status: SHIPPED | OUTPATIENT
Start: 2025-06-25

## 2025-06-25 NOTE — PROGRESS NOTES
Medicare Annual Wellness Visit    Magy Donald is here for Medicare AWV    Assessment & Plan  Annual wellness visit/health maintenance.  - Blood glucose level recorded at 107, a slight increase from the previous year's reading in the 90s.  - Kidney function remains within normal parameters. Liver function tests indicate a high normal range, with one test slightly above the upper limit at 42. Hemoglobin levels do not suggest anemia. Lipid profile is excellent, with total cholesterol at 153, triglycerides at 130, HDL at 41, and LDL at 86. Thyroid function tests are also within normal limits.  - Advised to reduce intake of ice cream and incorporate more vegetables into the diet. Regular exercise is encouraged.  - RSV vaccine recommended for additional protection against respiratory syncytial virus.   Hypertension.  - Blood pressure readings at home have been consistently around 132/70s.  - Discontinued olmesartan due to hair loss and has been off it for almost 2 months. Continues to take hydrochlorothiazide daily.      Gastroesophageal reflux disease (GERD).  - Reports occasional heartburn and a sensation of food getting stuck, which may indicate a narrowing of the esophagus.  - Advised to avoid spicy foods and large bites of food.  - Pepcid AC (famotidine) recommended for daily use over the next few months to manage symptoms.  - If the sensation persists or worsens, a referral to a gastroenterologist will be considered.     High cholesterol.  Doing well.  - Currently taking lovastatin 20 mg for cholesterol.    Hypothyroidism.  Doing well.  Continue levothyroxine 75 mcg daily.    She did have a trigger finger and naproxen helped.  She uses it sparingly.    Medicare annual wellness visit, subsequent  Essential hypertension, benign  -     hydroCHLOROthiazide (HYDRODIURIL) 25 MG tablet; Take 0.5 tablets by mouth daily, Disp-90 tablet, R-3Normal  Acquired hypothyroidism  -     levothyroxine (SYNTHROID) 75 MCG tablet; Take